# Patient Record
Sex: FEMALE | Race: WHITE | Employment: OTHER | URBAN - METROPOLITAN AREA
[De-identification: names, ages, dates, MRNs, and addresses within clinical notes are randomized per-mention and may not be internally consistent; named-entity substitution may affect disease eponyms.]

---

## 2017-03-14 DIAGNOSIS — I10 ESSENTIAL HYPERTENSION, BENIGN: ICD-10-CM

## 2017-03-14 RX ORDER — HYDROCHLOROTHIAZIDE 25 MG/1
25 TABLET ORAL DAILY
Qty: 90 TABLET | Refills: 1 | Status: SHIPPED | OUTPATIENT
Start: 2017-03-14 | End: 2018-02-20

## 2017-03-21 ENCOUNTER — OFFICE VISIT (OUTPATIENT)
Dept: FAMILY MEDICINE CLINIC | Age: 82
End: 2017-03-21
Payer: MEDICARE

## 2017-03-21 VITALS
OXYGEN SATURATION: 98 % | DIASTOLIC BLOOD PRESSURE: 60 MMHG | SYSTOLIC BLOOD PRESSURE: 102 MMHG | HEART RATE: 70 BPM | BODY MASS INDEX: 26.47 KG/M2 | WEIGHT: 164 LBS

## 2017-03-21 DIAGNOSIS — E78.00 PURE HYPERCHOLESTEROLEMIA: ICD-10-CM

## 2017-03-21 DIAGNOSIS — N39.46 MIXED INCONTINENCE: ICD-10-CM

## 2017-03-21 DIAGNOSIS — I10 ESSENTIAL HYPERTENSION, BENIGN: Primary | ICD-10-CM

## 2017-03-21 PROCEDURE — 1090F PRES/ABSN URINE INCON ASSESS: CPT | Performed by: FAMILY MEDICINE

## 2017-03-21 PROCEDURE — 1036F TOBACCO NON-USER: CPT | Performed by: FAMILY MEDICINE

## 2017-03-21 PROCEDURE — 1123F ACP DISCUSS/DSCN MKR DOCD: CPT | Performed by: FAMILY MEDICINE

## 2017-03-21 PROCEDURE — G8482 FLU IMMUNIZE ORDER/ADMIN: HCPCS | Performed by: FAMILY MEDICINE

## 2017-03-21 PROCEDURE — 4040F PNEUMOC VAC/ADMIN/RCVD: CPT | Performed by: FAMILY MEDICINE

## 2017-03-21 PROCEDURE — 0509F URINE INCON PLAN DOCD: CPT | Performed by: FAMILY MEDICINE

## 2017-03-21 PROCEDURE — G8420 CALC BMI NORM PARAMETERS: HCPCS | Performed by: FAMILY MEDICINE

## 2017-03-21 PROCEDURE — 99214 OFFICE O/P EST MOD 30 MIN: CPT | Performed by: FAMILY MEDICINE

## 2017-03-21 PROCEDURE — G8427 DOCREV CUR MEDS BY ELIG CLIN: HCPCS | Performed by: FAMILY MEDICINE

## 2017-03-21 ASSESSMENT — ENCOUNTER SYMPTOMS
SHORTNESS OF BREATH: 0
ABDOMINAL PAIN: 0
FACIAL SWELLING: 0
BLURRED VISION: 0
CONSTIPATION: 0
NAUSEA: 0
DIARRHEA: 0
EYE REDNESS: 0
VOMITING: 0
EYE DISCHARGE: 0
BLOOD IN STOOL: 0
COUGH: 0

## 2017-04-20 ENCOUNTER — HOSPITAL ENCOUNTER (OUTPATIENT)
Age: 82
Setting detail: SPECIMEN
Discharge: HOME | End: 2017-04-20
Payer: MEDICARE

## 2017-04-20 ENCOUNTER — OFFICE VISIT (OUTPATIENT)
Dept: FAMILY MEDICINE CLINIC | Age: 82
End: 2017-04-20
Payer: MEDICARE

## 2017-04-20 VITALS
HEIGHT: 64 IN | OXYGEN SATURATION: 96 % | TEMPERATURE: 97.8 F | DIASTOLIC BLOOD PRESSURE: 64 MMHG | SYSTOLIC BLOOD PRESSURE: 110 MMHG | HEART RATE: 72 BPM | WEIGHT: 162 LBS | BODY MASS INDEX: 27.66 KG/M2

## 2017-04-20 DIAGNOSIS — N30.00 ACUTE CYSTITIS WITHOUT HEMATURIA: Primary | ICD-10-CM

## 2017-04-20 DIAGNOSIS — S39.012A STRAIN OF LUMBAR PARASPINAL MUSCLE, INITIAL ENCOUNTER: ICD-10-CM

## 2017-04-20 DIAGNOSIS — N30.00 ACUTE CYSTITIS WITHOUT HEMATURIA: ICD-10-CM

## 2017-04-20 LAB
BILIRUBIN, POC: ABNORMAL
BLOOD URINE, POC: ABNORMAL
CLARITY, POC: ABNORMAL
COLOR, POC: ABNORMAL
GLUCOSE URINE, POC: ABNORMAL
KETONES, POC: ABNORMAL
LEUKOCYTE EST, POC: ABNORMAL
NITRITE, POC: POSITIVE
PH, POC: 6.5
PROTEIN, POC: ABNORMAL
SPECIFIC GRAVITY, POC: 1.01
UROBILINOGEN, POC: 0.2

## 2017-04-20 PROCEDURE — 87086 URINE CULTURE/COLONY COUNT: CPT

## 2017-04-20 PROCEDURE — 1123F ACP DISCUSS/DSCN MKR DOCD: CPT | Performed by: NURSE PRACTITIONER

## 2017-04-20 PROCEDURE — 81002 URINALYSIS NONAUTO W/O SCOPE: CPT | Performed by: NURSE PRACTITIONER

## 2017-04-20 PROCEDURE — 1090F PRES/ABSN URINE INCON ASSESS: CPT | Performed by: NURSE PRACTITIONER

## 2017-04-20 PROCEDURE — 1036F TOBACCO NON-USER: CPT | Performed by: NURSE PRACTITIONER

## 2017-04-20 PROCEDURE — G8427 DOCREV CUR MEDS BY ELIG CLIN: HCPCS | Performed by: NURSE PRACTITIONER

## 2017-04-20 PROCEDURE — 87186 SC STD MICRODIL/AGAR DIL: CPT

## 2017-04-20 PROCEDURE — 4040F PNEUMOC VAC/ADMIN/RCVD: CPT | Performed by: NURSE PRACTITIONER

## 2017-04-20 PROCEDURE — 99213 OFFICE O/P EST LOW 20 MIN: CPT | Performed by: NURSE PRACTITIONER

## 2017-04-20 PROCEDURE — 87088 URINE BACTERIA CULTURE: CPT

## 2017-04-20 PROCEDURE — G8420 CALC BMI NORM PARAMETERS: HCPCS | Performed by: NURSE PRACTITIONER

## 2017-04-20 RX ORDER — SULFAMETHOXAZOLE AND TRIMETHOPRIM 800; 160 MG/1; MG/1
1 TABLET ORAL 2 TIMES DAILY
Qty: 6 TABLET | Refills: 0 | Status: SHIPPED | OUTPATIENT
Start: 2017-04-20 | End: 2017-04-23

## 2017-04-20 ASSESSMENT — ENCOUNTER SYMPTOMS
BACK PAIN: 1
ABDOMINAL PAIN: 0
BOWEL INCONTINENCE: 0

## 2017-04-23 LAB
CULTURE: ABNORMAL
CULTURE: ABNORMAL
Lab: ABNORMAL
ORGANISM: ABNORMAL
SPECIMEN DESCRIPTION: ABNORMAL
SPECIMEN DESCRIPTION: ABNORMAL
STATUS: ABNORMAL

## 2017-04-24 RX ORDER — NITROFURANTOIN MACROCRYSTALS 100 MG/1
100 CAPSULE ORAL 2 TIMES DAILY
Qty: 10 CAPSULE | Refills: 0 | Status: SHIPPED | OUTPATIENT
Start: 2017-04-24 | End: 2017-04-29

## 2017-05-01 ENCOUNTER — OFFICE VISIT (OUTPATIENT)
Dept: FAMILY MEDICINE CLINIC | Age: 82
End: 2017-05-01
Payer: MEDICARE

## 2017-05-01 VITALS
HEART RATE: 76 BPM | WEIGHT: 162 LBS | SYSTOLIC BLOOD PRESSURE: 124 MMHG | BODY MASS INDEX: 27.66 KG/M2 | HEIGHT: 64 IN | DIASTOLIC BLOOD PRESSURE: 60 MMHG

## 2017-05-01 DIAGNOSIS — M54.50 ACUTE BILATERAL LOW BACK PAIN WITHOUT SCIATICA: ICD-10-CM

## 2017-05-01 DIAGNOSIS — R10.9 FLANK PAIN: Primary | ICD-10-CM

## 2017-05-01 LAB
BILIRUBIN, POC: NORMAL
BLOOD URINE, POC: NORMAL
CLARITY, POC: YELLOW
COLOR, POC: NORMAL
GLUCOSE URINE, POC: NORMAL
KETONES, POC: NORMAL
LEUKOCYTE EST, POC: NORMAL
NITRITE, POC: NORMAL
PH, POC: 7
PROTEIN, POC: NORMAL
SPECIFIC GRAVITY, POC: 1.02
UROBILINOGEN, POC: 0.2

## 2017-05-01 PROCEDURE — 1090F PRES/ABSN URINE INCON ASSESS: CPT | Performed by: FAMILY MEDICINE

## 2017-05-01 PROCEDURE — G8427 DOCREV CUR MEDS BY ELIG CLIN: HCPCS | Performed by: FAMILY MEDICINE

## 2017-05-01 PROCEDURE — G8420 CALC BMI NORM PARAMETERS: HCPCS | Performed by: FAMILY MEDICINE

## 2017-05-01 PROCEDURE — 99213 OFFICE O/P EST LOW 20 MIN: CPT | Performed by: FAMILY MEDICINE

## 2017-05-01 PROCEDURE — 1123F ACP DISCUSS/DSCN MKR DOCD: CPT | Performed by: FAMILY MEDICINE

## 2017-05-01 PROCEDURE — 1036F TOBACCO NON-USER: CPT | Performed by: FAMILY MEDICINE

## 2017-05-01 PROCEDURE — 81003 URINALYSIS AUTO W/O SCOPE: CPT | Performed by: FAMILY MEDICINE

## 2017-05-01 PROCEDURE — 4040F PNEUMOC VAC/ADMIN/RCVD: CPT | Performed by: FAMILY MEDICINE

## 2017-05-01 RX ORDER — NAPROXEN 500 MG/1
500 TABLET ORAL 2 TIMES DAILY WITH MEALS
Qty: 30 TABLET | Refills: 1 | Status: SHIPPED | OUTPATIENT
Start: 2017-05-01 | End: 2017-07-18 | Stop reason: SDUPTHER

## 2017-05-01 ASSESSMENT — ENCOUNTER SYMPTOMS
EYE DISCHARGE: 0
BOWEL INCONTINENCE: 0
VOMITING: 0
EYE REDNESS: 0
DIARRHEA: 0
BACK PAIN: 1
NAUSEA: 0
ABDOMINAL PAIN: 0

## 2017-07-18 ENCOUNTER — OFFICE VISIT (OUTPATIENT)
Dept: FAMILY MEDICINE CLINIC | Age: 82
End: 2017-07-18
Payer: MEDICARE

## 2017-07-18 VITALS — WEIGHT: 165 LBS | SYSTOLIC BLOOD PRESSURE: 102 MMHG | BODY MASS INDEX: 28.77 KG/M2 | DIASTOLIC BLOOD PRESSURE: 58 MMHG

## 2017-07-18 DIAGNOSIS — S76.302A HAMSTRING INJURY, LEFT, INITIAL ENCOUNTER: Primary | ICD-10-CM

## 2017-07-18 PROCEDURE — 1036F TOBACCO NON-USER: CPT | Performed by: FAMILY MEDICINE

## 2017-07-18 PROCEDURE — G8419 CALC BMI OUT NRM PARAM NOF/U: HCPCS | Performed by: FAMILY MEDICINE

## 2017-07-18 PROCEDURE — 4040F PNEUMOC VAC/ADMIN/RCVD: CPT | Performed by: FAMILY MEDICINE

## 2017-07-18 PROCEDURE — 1123F ACP DISCUSS/DSCN MKR DOCD: CPT | Performed by: FAMILY MEDICINE

## 2017-07-18 PROCEDURE — 99213 OFFICE O/P EST LOW 20 MIN: CPT | Performed by: FAMILY MEDICINE

## 2017-07-18 PROCEDURE — 1090F PRES/ABSN URINE INCON ASSESS: CPT | Performed by: FAMILY MEDICINE

## 2017-07-18 PROCEDURE — G8427 DOCREV CUR MEDS BY ELIG CLIN: HCPCS | Performed by: FAMILY MEDICINE

## 2017-07-18 RX ORDER — NAPROXEN 500 MG/1
500 TABLET ORAL 2 TIMES DAILY WITH MEALS
Qty: 180 TABLET | Refills: 0 | Status: SHIPPED | OUTPATIENT
Start: 2017-07-18 | End: 2018-01-12 | Stop reason: SDUPTHER

## 2017-07-18 ASSESSMENT — PATIENT HEALTH QUESTIONNAIRE - PHQ9
SUM OF ALL RESPONSES TO PHQ QUESTIONS 1-9: 0
SUM OF ALL RESPONSES TO PHQ9 QUESTIONS 1 & 2: 0
1. LITTLE INTEREST OR PLEASURE IN DOING THINGS: 0
2. FEELING DOWN, DEPRESSED OR HOPELESS: 0

## 2017-07-18 ASSESSMENT — ENCOUNTER SYMPTOMS
EYE DISCHARGE: 0
EYE REDNESS: 0
NAUSEA: 0
DIARRHEA: 0
BACK PAIN: 0
VOMITING: 0

## 2017-07-20 ENCOUNTER — HOSPITAL ENCOUNTER (OUTPATIENT)
Dept: PHYSICAL THERAPY | Age: 82
Setting detail: THERAPIES SERIES
Discharge: HOME | End: 2017-07-20
Payer: MEDICARE

## 2017-07-20 PROCEDURE — G8978 MOBILITY CURRENT STATUS: HCPCS

## 2017-07-20 PROCEDURE — G8979 MOBILITY GOAL STATUS: HCPCS

## 2017-07-20 PROCEDURE — 97161 PT EVAL LOW COMPLEX 20 MIN: CPT

## 2017-07-20 ASSESSMENT — PAIN SCALES - GENERAL: PAINLEVEL_OUTOF10: 4

## 2017-07-24 ENCOUNTER — HOSPITAL ENCOUNTER (OUTPATIENT)
Dept: PHYSICAL THERAPY | Age: 82
Setting detail: THERAPIES SERIES
Discharge: HOME | End: 2017-07-24
Payer: MEDICARE

## 2017-07-24 PROCEDURE — 97140 MANUAL THERAPY 1/> REGIONS: CPT

## 2017-07-24 PROCEDURE — 97110 THERAPEUTIC EXERCISES: CPT

## 2017-07-24 ASSESSMENT — PAIN SCALES - GENERAL: PAINLEVEL_OUTOF10: 4

## 2017-07-27 ENCOUNTER — HOSPITAL ENCOUNTER (OUTPATIENT)
Dept: PHYSICAL THERAPY | Age: 82
Setting detail: THERAPIES SERIES
Discharge: HOME | End: 2017-07-27
Payer: MEDICARE

## 2017-07-27 ENCOUNTER — APPOINTMENT (OUTPATIENT)
Dept: PHYSICAL THERAPY | Age: 82
End: 2017-07-27
Payer: MEDICARE

## 2017-07-27 PROCEDURE — 97110 THERAPEUTIC EXERCISES: CPT

## 2017-07-27 PROCEDURE — 97140 MANUAL THERAPY 1/> REGIONS: CPT

## 2017-07-27 ASSESSMENT — PAIN SCALES - GENERAL: PAINLEVEL_OUTOF10: 8

## 2017-07-31 ENCOUNTER — HOSPITAL ENCOUNTER (OUTPATIENT)
Dept: PHYSICAL THERAPY | Age: 82
Setting detail: THERAPIES SERIES
Discharge: HOME | End: 2017-07-31
Payer: MEDICARE

## 2017-07-31 PROCEDURE — 97140 MANUAL THERAPY 1/> REGIONS: CPT

## 2017-07-31 PROCEDURE — G0283 ELEC STIM OTHER THAN WOUND: HCPCS

## 2017-07-31 PROCEDURE — 97110 THERAPEUTIC EXERCISES: CPT

## 2017-07-31 ASSESSMENT — PAIN SCALES - GENERAL: PAINLEVEL_OUTOF10: 9

## 2017-08-03 ENCOUNTER — HOSPITAL ENCOUNTER (OUTPATIENT)
Dept: PHYSICAL THERAPY | Age: 82
Setting detail: THERAPIES SERIES
Discharge: HOME | End: 2017-08-03
Payer: MEDICARE

## 2017-08-03 ENCOUNTER — OFFICE VISIT (OUTPATIENT)
Dept: FAMILY MEDICINE CLINIC | Age: 82
End: 2017-08-03
Payer: MEDICARE

## 2017-08-03 VITALS — SYSTOLIC BLOOD PRESSURE: 138 MMHG | DIASTOLIC BLOOD PRESSURE: 64 MMHG | BODY MASS INDEX: 28.77 KG/M2 | WEIGHT: 165 LBS

## 2017-08-03 DIAGNOSIS — R21 RASH AND NONSPECIFIC SKIN ERUPTION: Primary | ICD-10-CM

## 2017-08-03 PROCEDURE — 97140 MANUAL THERAPY 1/> REGIONS: CPT

## 2017-08-03 PROCEDURE — G8419 CALC BMI OUT NRM PARAM NOF/U: HCPCS | Performed by: FAMILY MEDICINE

## 2017-08-03 PROCEDURE — G0283 ELEC STIM OTHER THAN WOUND: HCPCS

## 2017-08-03 PROCEDURE — 1036F TOBACCO NON-USER: CPT | Performed by: FAMILY MEDICINE

## 2017-08-03 PROCEDURE — 1090F PRES/ABSN URINE INCON ASSESS: CPT | Performed by: FAMILY MEDICINE

## 2017-08-03 PROCEDURE — 1123F ACP DISCUSS/DSCN MKR DOCD: CPT | Performed by: FAMILY MEDICINE

## 2017-08-03 PROCEDURE — 99213 OFFICE O/P EST LOW 20 MIN: CPT | Performed by: FAMILY MEDICINE

## 2017-08-03 PROCEDURE — 4040F PNEUMOC VAC/ADMIN/RCVD: CPT | Performed by: FAMILY MEDICINE

## 2017-08-03 PROCEDURE — G8427 DOCREV CUR MEDS BY ELIG CLIN: HCPCS | Performed by: FAMILY MEDICINE

## 2017-08-03 PROCEDURE — 97110 THERAPEUTIC EXERCISES: CPT

## 2017-08-03 ASSESSMENT — PAIN SCALES - GENERAL: PAINLEVEL_OUTOF10: 4

## 2017-08-03 ASSESSMENT — ENCOUNTER SYMPTOMS
NAUSEA: 0
DIARRHEA: 0
SORE THROAT: 0
VOMITING: 0
RHINORRHEA: 0
SHORTNESS OF BREATH: 0
COUGH: 0

## 2017-08-07 ENCOUNTER — HOSPITAL ENCOUNTER (OUTPATIENT)
Dept: PHYSICAL THERAPY | Age: 82
Setting detail: THERAPIES SERIES
Discharge: HOME | End: 2017-08-07
Payer: MEDICARE

## 2017-08-07 PROCEDURE — 97110 THERAPEUTIC EXERCISES: CPT

## 2017-08-07 PROCEDURE — G0283 ELEC STIM OTHER THAN WOUND: HCPCS

## 2017-08-07 PROCEDURE — 97140 MANUAL THERAPY 1/> REGIONS: CPT

## 2017-08-07 ASSESSMENT — PAIN SCALES - GENERAL: PAINLEVEL_OUTOF10: 5

## 2017-08-10 ENCOUNTER — HOSPITAL ENCOUNTER (OUTPATIENT)
Dept: PHYSICAL THERAPY | Age: 82
Setting detail: THERAPIES SERIES
Discharge: HOME | End: 2017-08-10
Payer: MEDICARE

## 2017-08-10 PROCEDURE — 97110 THERAPEUTIC EXERCISES: CPT

## 2017-08-10 PROCEDURE — 97140 MANUAL THERAPY 1/> REGIONS: CPT

## 2017-08-10 ASSESSMENT — PAIN SCALES - GENERAL: PAINLEVEL_OUTOF10: 5

## 2017-08-14 ENCOUNTER — HOSPITAL ENCOUNTER (OUTPATIENT)
Dept: PHYSICAL THERAPY | Age: 82
Setting detail: THERAPIES SERIES
Discharge: HOME | End: 2017-08-14
Payer: MEDICARE

## 2017-08-14 PROCEDURE — 97140 MANUAL THERAPY 1/> REGIONS: CPT

## 2017-08-14 PROCEDURE — 97110 THERAPEUTIC EXERCISES: CPT

## 2017-08-17 ENCOUNTER — HOSPITAL ENCOUNTER (OUTPATIENT)
Dept: PHYSICAL THERAPY | Age: 82
Setting detail: THERAPIES SERIES
Discharge: HOME | End: 2017-08-17
Payer: MEDICARE

## 2017-08-17 PROCEDURE — G8978 MOBILITY CURRENT STATUS: HCPCS

## 2017-08-17 PROCEDURE — 97012 MECHANICAL TRACTION THERAPY: CPT

## 2017-08-17 PROCEDURE — G8979 MOBILITY GOAL STATUS: HCPCS

## 2017-08-17 PROCEDURE — 97110 THERAPEUTIC EXERCISES: CPT

## 2017-08-17 ASSESSMENT — PAIN SCALES - GENERAL: PAINLEVEL_OUTOF10: 3

## 2017-08-21 ENCOUNTER — HOSPITAL ENCOUNTER (OUTPATIENT)
Dept: PHYSICAL THERAPY | Age: 82
Setting detail: THERAPIES SERIES
Discharge: HOME | End: 2017-08-21
Payer: MEDICARE

## 2017-08-21 PROCEDURE — 97012 MECHANICAL TRACTION THERAPY: CPT

## 2017-08-21 PROCEDURE — 97110 THERAPEUTIC EXERCISES: CPT

## 2017-08-21 ASSESSMENT — PAIN SCALES - GENERAL: PAINLEVEL_OUTOF10: 6

## 2017-08-24 ENCOUNTER — HOSPITAL ENCOUNTER (OUTPATIENT)
Dept: PHYSICAL THERAPY | Age: 82
Setting detail: THERAPIES SERIES
Discharge: HOME | End: 2017-08-24
Payer: MEDICARE

## 2017-08-24 PROCEDURE — 97110 THERAPEUTIC EXERCISES: CPT

## 2017-08-24 PROCEDURE — 97012 MECHANICAL TRACTION THERAPY: CPT

## 2017-08-28 ENCOUNTER — HOSPITAL ENCOUNTER (OUTPATIENT)
Dept: PHYSICAL THERAPY | Age: 82
Setting detail: THERAPIES SERIES
Discharge: HOME | End: 2017-08-28
Payer: MEDICARE

## 2017-08-28 PROCEDURE — 97110 THERAPEUTIC EXERCISES: CPT

## 2017-08-28 PROCEDURE — 97012 MECHANICAL TRACTION THERAPY: CPT

## 2017-08-28 ASSESSMENT — PAIN SCALES - GENERAL: PAINLEVEL_OUTOF10: 7

## 2017-08-31 ENCOUNTER — HOSPITAL ENCOUNTER (OUTPATIENT)
Dept: PHYSICAL THERAPY | Age: 82
Setting detail: THERAPIES SERIES
Discharge: HOME | End: 2017-08-31
Payer: MEDICARE

## 2017-08-31 PROCEDURE — 97140 MANUAL THERAPY 1/> REGIONS: CPT

## 2017-08-31 PROCEDURE — G8980 MOBILITY D/C STATUS: HCPCS

## 2017-08-31 PROCEDURE — 97110 THERAPEUTIC EXERCISES: CPT

## 2017-08-31 PROCEDURE — G8979 MOBILITY GOAL STATUS: HCPCS

## 2017-08-31 ASSESSMENT — PAIN SCALES - GENERAL: PAINLEVEL_OUTOF10: 5

## 2017-09-07 ENCOUNTER — HOSPITAL ENCOUNTER (OUTPATIENT)
Dept: GENERAL RADIOLOGY | Age: 82
Discharge: HOME | End: 2017-09-07
Payer: MEDICARE

## 2017-09-07 ENCOUNTER — OFFICE VISIT (OUTPATIENT)
Dept: FAMILY MEDICINE CLINIC | Age: 82
End: 2017-09-07
Payer: MEDICARE

## 2017-09-07 ENCOUNTER — HOSPITAL ENCOUNTER (OUTPATIENT)
Age: 82
Discharge: HOME | End: 2017-09-07
Payer: MEDICARE

## 2017-09-07 VITALS — BODY MASS INDEX: 28.6 KG/M2 | WEIGHT: 164 LBS | SYSTOLIC BLOOD PRESSURE: 110 MMHG | DIASTOLIC BLOOD PRESSURE: 60 MMHG

## 2017-09-07 DIAGNOSIS — M54.42 ACUTE LEFT-SIDED LOW BACK PAIN WITH LEFT-SIDED SCIATICA: ICD-10-CM

## 2017-09-07 DIAGNOSIS — M25.552 PAIN OF LEFT HIP JOINT: Primary | ICD-10-CM

## 2017-09-07 DIAGNOSIS — M25.552 PAIN OF LEFT HIP JOINT: ICD-10-CM

## 2017-09-07 DIAGNOSIS — R20.2 PARESTHESIA OF LEFT LEG: ICD-10-CM

## 2017-09-07 PROCEDURE — G8427 DOCREV CUR MEDS BY ELIG CLIN: HCPCS | Performed by: FAMILY MEDICINE

## 2017-09-07 PROCEDURE — 1036F TOBACCO NON-USER: CPT | Performed by: FAMILY MEDICINE

## 2017-09-07 PROCEDURE — G8419 CALC BMI OUT NRM PARAM NOF/U: HCPCS | Performed by: FAMILY MEDICINE

## 2017-09-07 PROCEDURE — 4040F PNEUMOC VAC/ADMIN/RCVD: CPT | Performed by: FAMILY MEDICINE

## 2017-09-07 PROCEDURE — 1123F ACP DISCUSS/DSCN MKR DOCD: CPT | Performed by: FAMILY MEDICINE

## 2017-09-07 PROCEDURE — 99213 OFFICE O/P EST LOW 20 MIN: CPT | Performed by: FAMILY MEDICINE

## 2017-09-07 PROCEDURE — 1090F PRES/ABSN URINE INCON ASSESS: CPT | Performed by: FAMILY MEDICINE

## 2017-09-07 PROCEDURE — 73502 X-RAY EXAM HIP UNI 2-3 VIEWS: CPT

## 2017-09-07 ASSESSMENT — ENCOUNTER SYMPTOMS
BOWEL INCONTINENCE: 0
BACK PAIN: 1
EYE DISCHARGE: 0
EYE REDNESS: 0

## 2017-09-11 ENCOUNTER — HOSPITAL ENCOUNTER (OUTPATIENT)
Dept: MRI IMAGING | Age: 82
Discharge: HOME | End: 2017-09-11
Payer: MEDICARE

## 2017-09-11 DIAGNOSIS — M54.42 ACUTE LEFT-SIDED LOW BACK PAIN WITH LEFT-SIDED SCIATICA: ICD-10-CM

## 2017-09-11 DIAGNOSIS — R20.2 PARESTHESIA OF LEFT LEG: ICD-10-CM

## 2017-09-11 PROCEDURE — 72148 MRI LUMBAR SPINE W/O DYE: CPT

## 2017-09-13 ENCOUNTER — TELEPHONE (OUTPATIENT)
Dept: FAMILY MEDICINE CLINIC | Age: 82
End: 2017-09-13

## 2017-09-13 DIAGNOSIS — M25.552 LEFT HIP PAIN: Primary | ICD-10-CM

## 2017-09-13 DIAGNOSIS — G89.29 CHRONIC LEFT-SIDED LOW BACK PAIN WITHOUT SCIATICA: ICD-10-CM

## 2017-09-13 DIAGNOSIS — M54.50 CHRONIC LEFT-SIDED LOW BACK PAIN WITHOUT SCIATICA: ICD-10-CM

## 2017-09-15 ENCOUNTER — HOSPITAL ENCOUNTER (OUTPATIENT)
Dept: PHYSICAL THERAPY | Age: 82
Setting detail: THERAPIES SERIES
Discharge: HOME | End: 2017-09-15
Payer: MEDICARE

## 2017-09-15 ENCOUNTER — TELEPHONE (OUTPATIENT)
Dept: FAMILY MEDICINE CLINIC | Age: 82
End: 2017-09-15

## 2017-10-12 ENCOUNTER — OFFICE VISIT (OUTPATIENT)
Dept: FAMILY MEDICINE CLINIC | Age: 82
End: 2017-10-12
Payer: MEDICARE

## 2017-10-12 VITALS — DIASTOLIC BLOOD PRESSURE: 64 MMHG | SYSTOLIC BLOOD PRESSURE: 120 MMHG | WEIGHT: 164 LBS | BODY MASS INDEX: 28.6 KG/M2

## 2017-10-12 DIAGNOSIS — M79.605 LEG PAIN, POSTERIOR, LEFT: Primary | ICD-10-CM

## 2017-10-12 DIAGNOSIS — R07.89 ATYPICAL CHEST PAIN: ICD-10-CM

## 2017-10-12 PROCEDURE — 99213 OFFICE O/P EST LOW 20 MIN: CPT | Performed by: FAMILY MEDICINE

## 2017-10-12 PROCEDURE — 1090F PRES/ABSN URINE INCON ASSESS: CPT | Performed by: FAMILY MEDICINE

## 2017-10-12 PROCEDURE — G8482 FLU IMMUNIZE ORDER/ADMIN: HCPCS | Performed by: FAMILY MEDICINE

## 2017-10-12 PROCEDURE — 1036F TOBACCO NON-USER: CPT | Performed by: FAMILY MEDICINE

## 2017-10-12 PROCEDURE — 4040F PNEUMOC VAC/ADMIN/RCVD: CPT | Performed by: FAMILY MEDICINE

## 2017-10-12 PROCEDURE — G8427 DOCREV CUR MEDS BY ELIG CLIN: HCPCS | Performed by: FAMILY MEDICINE

## 2017-10-12 PROCEDURE — G8417 CALC BMI ABV UP PARAM F/U: HCPCS | Performed by: FAMILY MEDICINE

## 2017-10-12 PROCEDURE — 1123F ACP DISCUSS/DSCN MKR DOCD: CPT | Performed by: FAMILY MEDICINE

## 2017-10-12 RX ORDER — OMEPRAZOLE 20 MG/1
20 CAPSULE, DELAYED RELEASE ORAL DAILY
Qty: 30 CAPSULE | Refills: 1 | Status: SHIPPED | OUTPATIENT
Start: 2017-10-12 | End: 2017-11-21 | Stop reason: SDUPTHER

## 2017-10-12 ASSESSMENT — ENCOUNTER SYMPTOMS
HEMOPTYSIS: 0
NAUSEA: 0
DIARRHEA: 0
EYE REDNESS: 0
COUGH: 0
VOMITING: 0
SHORTNESS OF BREATH: 0
EYE DISCHARGE: 0

## 2017-10-12 NOTE — PROGRESS NOTES
HPI Notes    Name: Michael Benjamin  : 1924         Chief Complaint:     Chief Complaint   Patient presents with    Leg Pain     Pt continues to c/o Lf leg pain, Pt is taking Naproxen BID with food, Pt had left hip x-ray on 17 and MRI lumbar spine  17     Chest Pain     Pt c/o pressure/dull pain in Rt chest are under Rt breast,  Pt denies SOB,HA,N/V, numbness, tingling       History of Present Illness:      Michael Benjamin is a 80 y.o.  female who presents with Leg Pain (Pt continues to c/o Lf leg pain, Pt is taking Naproxen BID with food, Pt had left hip x-ray on 17 and MRI lumbar spine  17 ) and Chest Pain (Pt c/o pressure/dull pain in Rt chest are under Rt breast,  Pt denies SOB,HA,N/V, numbness, tingling)      Leg Pain    Incident onset: Pt has had this pain for about 6mos. There was no injury mechanism. The pain is present in the left leg. The quality of the pain is described as aching. The pain has been fluctuating since onset. Pertinent negatives include no inability to bear weight, loss of motion, muscle weakness, numbness or tingling. Exacerbated by: walking. She has tried heat, ice and rest (physical therapy. Pt states she felt the physical therapy did help some but PT said she had completed her time. ) for the symptoms. Chest Pain    This is a new problem. The current episode started in the past 7 days. The onset quality is gradual (Pt also states she has been eating more tomatoes. ). The problem occurs intermittently. The pain is present in the substernal region. Quality: Pt states she feels a pressure when she takes a deep breath. The pain does not radiate. Associated symptoms include leg pain. Pertinent negatives include no cough, dizziness, fever, headaches, hemoptysis, irregular heartbeat, lower extremity edema, nausea, near-syncope, numbness, palpitations, shortness of breath or vomiting. Associated symptoms comments: \"bitter burping\" .  The pain is aggravated by

## 2017-10-25 ENCOUNTER — OFFICE VISIT (OUTPATIENT)
Dept: FAMILY MEDICINE CLINIC | Age: 82
End: 2017-10-25
Payer: MEDICARE

## 2017-10-25 VITALS
BODY MASS INDEX: 28.68 KG/M2 | DIASTOLIC BLOOD PRESSURE: 54 MMHG | HEART RATE: 68 BPM | HEIGHT: 64 IN | WEIGHT: 168 LBS | SYSTOLIC BLOOD PRESSURE: 102 MMHG

## 2017-10-25 DIAGNOSIS — R20.2 PARESTHESIA OF LEFT LEG: ICD-10-CM

## 2017-10-25 DIAGNOSIS — I95.89 OTHER SPECIFIED HYPOTENSION: ICD-10-CM

## 2017-10-25 DIAGNOSIS — K21.9 GASTROESOPHAGEAL REFLUX DISEASE WITHOUT ESOPHAGITIS: Primary | ICD-10-CM

## 2017-10-25 PROCEDURE — G8417 CALC BMI ABV UP PARAM F/U: HCPCS | Performed by: FAMILY MEDICINE

## 2017-10-25 PROCEDURE — G8427 DOCREV CUR MEDS BY ELIG CLIN: HCPCS | Performed by: FAMILY MEDICINE

## 2017-10-25 PROCEDURE — 1090F PRES/ABSN URINE INCON ASSESS: CPT | Performed by: FAMILY MEDICINE

## 2017-10-25 PROCEDURE — 1123F ACP DISCUSS/DSCN MKR DOCD: CPT | Performed by: FAMILY MEDICINE

## 2017-10-25 PROCEDURE — G8482 FLU IMMUNIZE ORDER/ADMIN: HCPCS | Performed by: FAMILY MEDICINE

## 2017-10-25 PROCEDURE — 1036F TOBACCO NON-USER: CPT | Performed by: FAMILY MEDICINE

## 2017-10-25 PROCEDURE — 99213 OFFICE O/P EST LOW 20 MIN: CPT | Performed by: FAMILY MEDICINE

## 2017-10-25 PROCEDURE — 4040F PNEUMOC VAC/ADMIN/RCVD: CPT | Performed by: FAMILY MEDICINE

## 2017-10-25 ASSESSMENT — ENCOUNTER SYMPTOMS
CHOKING: 0
ABDOMINAL PAIN: 0
SORE THROAT: 0
COUGH: 0
FACIAL SWELLING: 0
HEARTBURN: 0
VOMITING: 0
SHORTNESS OF BREATH: 0
NAUSEA: 0
EYE DISCHARGE: 0
EYE REDNESS: 0
CONSTIPATION: 0
DIARRHEA: 0

## 2017-10-25 NOTE — PRE-CERTIFICATION NOTE
Medicare Cap     [] Physical Therapy  [] Speech Therapy  [] Occupational therapy    *PT and Speech caps combine      $6119 Cap limit < kx modifier needed < $3487 requires pre-cert     Patient Name: Binh Penaloza  YOB: 1924     Date of M[address] Name $$$ charge Daily Charge YTD   Total $   10/25/17 PREV 2017 [facility] CHARGES 935.13 935.13 935.13

## 2017-10-26 ENCOUNTER — TELEPHONE (OUTPATIENT)
Dept: FAMILY MEDICINE CLINIC | Age: 82
End: 2017-10-26

## 2017-10-26 DIAGNOSIS — M48.061 SPINAL STENOSIS OF LUMBAR REGION, UNSPECIFIED WHETHER NEUROGENIC CLAUDICATION PRESENT: ICD-10-CM

## 2017-10-26 DIAGNOSIS — R20.2 LEFT LEG PARESTHESIAS: Primary | ICD-10-CM

## 2017-10-26 NOTE — TELEPHONE ENCOUNTER
Patient has decided that \"Dr Adilene Crook is right and I probably should see the vein specialist\" - states that she is just putting off the inevitable - could we do a referral for her    Health Maintenance   Topic Date Due    DTaP/Tdap/Td vaccine (1 - Tdap) 12/12/1943    Zostavax vaccine  12/12/1984    Flu vaccine  Completed    Pneumococcal low/med risk  Completed             (applicable per patient's age: Cancer Screenings, Depression Screening, Fall Risk Screening, Immunizations)    LDL Cholesterol (mg/dL)   Date Value   09/12/2016 138 (H)     AST (U/L)   Date Value   09/12/2016 20     ALT (U/L)   Date Value   09/12/2016 14     BUN (mg/dL)   Date Value   09/12/2016 22      (goal A1C is < 7)   (goal LDL is <100) need 30-50% reduction from baseline     BP Readings from Last 3 Encounters:   10/25/17 (!) 102/54   10/12/17 120/64   09/07/17 110/60    (goal /80)      All Future Testing planned in [facility]:  Lab Frequency Next Occurrence       Next Visit Date:  Future Appointments  Date Time Provider Damon Cesar   11/2/2017 2:00 PM Carl Schulz PT [facility] PT Bobby Rivera   11/20/2017 9:00 AM Norris Reyes MD [facility] MED W            Patient Active Problem List:     Esophageal reflux     Diaphragmatic hernia     Essential hypertension, benign     Pure hypercholesterolemia     Contact dermatitis     Lower urinary tract infectious disease     Cholelithiases     Cholangitis     Endometrial thickening on ultra sound     Cervical stenosis (uterine cervix)

## 2017-11-02 ENCOUNTER — HOSPITAL ENCOUNTER (OUTPATIENT)
Dept: PHYSICAL THERAPY | Age: 82
Setting detail: THERAPIES SERIES
Discharge: HOME | End: 2017-11-02
Payer: MEDICARE

## 2017-11-02 PROCEDURE — 97110 THERAPEUTIC EXERCISES: CPT

## 2017-11-02 PROCEDURE — G8979 MOBILITY GOAL STATUS: HCPCS

## 2017-11-02 PROCEDURE — 97162 PT EVAL MOD COMPLEX 30 MIN: CPT

## 2017-11-02 PROCEDURE — G8978 MOBILITY CURRENT STATUS: HCPCS

## 2017-11-02 ASSESSMENT — PAIN DESCRIPTION - LOCATION: LOCATION: LEG

## 2017-11-02 ASSESSMENT — PAIN SCALES - GENERAL: PAINLEVEL_OUTOF10: 4

## 2017-11-02 ASSESSMENT — PAIN DESCRIPTION - ORIENTATION: ORIENTATION: LEFT

## 2017-11-02 NOTE — PROGRESS NOTES
Injury/Surgery  [] Seizure Disorder   [] WB Restrictions  [] Obesity    [] Neuropathy      [x] Primary Impairment : Mobility [] Secondary Impairment : NA        G Code:    [x] Mobility         [] Carry        [] Body Position       [] Self Care      [] Other:   Functional Impairment Current:  [] 0%    [] 1-19% [] 20-39% [] 40-59% [x] 60-79%    [] 80-99% [] 100%    Functional Impairment Goal:  [] 0%    [] 1-19% [] 20-39% [x] 40-59% [] 60-79%    [] 80-99% [] 100%    G Code Functional Impairment determined by:  [] Clinical Judgment   [x] Outcome Measure:     Education:   Patient Education: On POC       Goals  Short term goals  Time Frame for Short term goals: 8  Short term goal 1: Patient to be independent with HEP for low back stretches and trunk stability  Short term goal 2:  Increase strength left hip abd 4+/5  Short term goal 3: Patient to be able to squat and  5# object from floor safely without LOB x10    Long term goals  Time Frame for Long term goals : 10  Long term goal 1: Decrease pain 2/10 left leg x 3 days  Long term goal 2: Improve mobility with score of 40/80 or better on LEFS  Long term goal 3: Patient to be able to alternate feet up and down stairs safely    Patient's Goal:   Decrease pain in left leg    Timed Code Treatment Minutes: 40 Minutes  Total Treatment Time: 40     Time In: 14;00  Time Out: 14;40    Franchesca Steel  11/2/2017

## 2017-11-07 ENCOUNTER — HOSPITAL ENCOUNTER (OUTPATIENT)
Dept: PHYSICAL THERAPY | Age: 82
Setting detail: THERAPIES SERIES
Discharge: HOME | End: 2017-11-07
Payer: MEDICARE

## 2017-11-07 PROCEDURE — 97140 MANUAL THERAPY 1/> REGIONS: CPT

## 2017-11-07 PROCEDURE — 97110 THERAPEUTIC EXERCISES: CPT

## 2017-11-07 ASSESSMENT — PAIN SCALES - GENERAL: PAINLEVEL_OUTOF10: 3

## 2017-11-07 ASSESSMENT — PAIN DESCRIPTION - LOCATION: LOCATION: LEG

## 2017-11-07 ASSESSMENT — PAIN DESCRIPTION - ORIENTATION: ORIENTATION: LEFT

## 2017-11-07 NOTE — PRE-CERTIFICATION NOTE
Medicare Cap     [] Physical Therapy  [] Speech Therapy  [] Occupational therapy    *PT and Speech caps combine      $2297 Cap limit < kx modifier needed < $7071 requires pre-cert     Patient Name: Boubacar Caputo  YOB: 1924     Date of M[address] Name $$$ charge Daily Charge YTD   Total $   11/2/17 Previous balance 935.13  935.13   11/2/17 Eval, therex 78.94, 31.69 110.63 1045.76   11/07/17 Ex x 2, man 31.69 x 2, 29.30 92.68 1138.44

## 2017-11-09 ENCOUNTER — HOSPITAL ENCOUNTER (OUTPATIENT)
Dept: PHYSICAL THERAPY | Age: 82
Setting detail: THERAPIES SERIES
Discharge: HOME | End: 2017-11-09
Payer: MEDICARE

## 2017-11-09 PROCEDURE — 97110 THERAPEUTIC EXERCISES: CPT

## 2017-11-09 PROCEDURE — 97140 MANUAL THERAPY 1/> REGIONS: CPT

## 2017-11-09 ASSESSMENT — PAIN SCALES - GENERAL: PAINLEVEL_OUTOF10: 3

## 2017-11-09 NOTE — PROGRESS NOTES
Phone: [address]      Fax: [phone]                            Outpatient Physical Therapy                                                                            Daily Note    Date: 2017  Patient Name: Nelda Rodriguez        MRN: [id number]   ACCT#:  [de-identified]  : 1924  (80 y.o.)    Referring Practitioner: Dr Rick Conde    Referral Date : 10/25/17    Diagnosis: Paresthesia left leg  Treatment Diagnosis: left leg pain, back pain    Onset Date: 17  PT Insurance Information: [facility]  Total # of Visits Approved: 10 Per Physician Order  Total # of Visits to Date: 3    Pre-Treatment Pain:  3/10     Assessment  Assessment: Pt. reports pain is a 3/10 this morning. She notes that following treatment she usually feels pretty good afterwards with relief lasting for a few days, but then the pain gradually returns. Good tolerance to exercises today. Post manual pt. reports no pain. Chart Reviewed: Yes    Plan  Plan: Continue with current plan    Exercises/Modalities/Manual:  See DocFlow Sheet    Education:           Goals  (Total # of Visits to Date: 3)   Short Term Goals - Time Frame for Short term goals: 8  Short term goal 1: Patient to be independent with HEP for low back stretches and trunk stability  Short term goal 2:  Increase strength left hip abd 4+/5  Short term goal 3: Patient to be able to squat and  5# object from floor safely without LOB x10          Long Term Goals - Time Frame for Long term goals : 10  Long term goal 1: Decrease pain 2/10 left leg x 3 days  Long term goal 2: Improve mobility with score of 40/80 or better on LEFS  Long term goal 3: Patient to be able to alternate feet up and down stairs safely          Post Treatment Pain:  0/10    Time In: 1045    Time Out : 1125   Timed Code Treatment Minutes: 40 Minutes  Total Treatment Time: [place] Number: PTA    Date: 2017

## 2017-11-13 ENCOUNTER — HOSPITAL ENCOUNTER (OUTPATIENT)
Dept: PHYSICAL THERAPY | Age: 82
Setting detail: THERAPIES SERIES
Discharge: HOME | End: 2017-11-13
Payer: MEDICARE

## 2017-11-13 PROCEDURE — 97110 THERAPEUTIC EXERCISES: CPT

## 2017-11-13 PROCEDURE — 97140 MANUAL THERAPY 1/> REGIONS: CPT

## 2017-11-13 ASSESSMENT — PAIN DESCRIPTION - LOCATION: LOCATION: LEG

## 2017-11-13 ASSESSMENT — PAIN DESCRIPTION - ORIENTATION: ORIENTATION: LEFT

## 2017-11-13 ASSESSMENT — PAIN SCALES - GENERAL: PAINLEVEL_OUTOF10: 4

## 2017-11-13 NOTE — PROGRESS NOTES
Phone: [address]      Fax: [phone]                            Outpatient Physical Therapy                                                                            Daily Note    Date: 2017  Patient Name: Keller Merlin        MRN: [id number]   ACCT#:  [de-identified]  : 1924  (80 y.o.)    Referring Practitioner: Dr Luan Sanders    Referral Date : 10/25/17    Diagnosis: Paresthesia left leg  Treatment Diagnosis: left leg pain, back pain    Onset Date: 17  PT Insurance Information: [facility]  Total # of Visits Approved: 10 Per Physician Order  Total # of Visits to Date: 4    Pre-Treatment Pain:  4/10     Assessment  Assessment: Pain 4/10 left hip area per patient. Patient able to squat and  5 cones without UE supprot, no LOB. Standing HEP ex- march, hip adb, hip ext, squats, heel raises x10 each- issued HEP handout. Chart Reviewed: Yes    Plan  Plan: Continue with current plan    Exercises/Modalities/Manual:  See DocFlow Sheet    Education:  standing HEP ex- march, hip adb, hip ext, squats, heel raises x10 each- issued HEP handout. Goals  (Total # of Visits to Date: 4)   Short Term Goals - Time Frame for Short term goals: 8  Short term goal 1: Patient to be independent with HEP for low back stretches and trunk stability  Short term goal 2:  Increase strength left hip abd 4+/5  Short term goal 3: Patient to be able to squat and  5# object from floor safely without LOB x10        Long Term Goals - Time Frame for Long term goals : 10  Long term goal 1: Decrease pain 2/10 left leg x 3 days  Long term goal 2: Improve mobility with score of 40/80 or better on LEFS  Long term goal 3: Patient to be able to alternate feet up and down stairs safely        Post Treatment Pain:  0/10    Time In: 9:00    Time Out : 9:43        Timed Code Treatment Minutes: 43 Minutes  Total Treatment Time:  [facility] Number: [id number]

## 2017-11-13 NOTE — PRE-CERTIFICATION NOTE
Medicare Cap     [] Physical Therapy  [] Speech Therapy  [] Occupational therapy    *PT and Speech caps combine      $1940 Cap limit < kx modifier needed < $5562 requires pre-cert     Patient Name: Brenda Europe: 12/12/1924     Date of M[address] Name $$$ charge Daily Charge YTD   Total $   11/2/17 Previous balance 935.13  935.13   11/2/17 Eval, therex 78.94, 31.69 110.63 1045.76   11/07/17 Ex x 2, man 31.69 x 2, 29.30 92.68 1138.44   11/9/17 Ex x2, man 31.69 x2, 29.30 92.68 1231.12   11/13/17 Ex x2, man 31.69 X 2, 29.30 92.68 1323.80

## 2017-11-16 ENCOUNTER — HOSPITAL ENCOUNTER (OUTPATIENT)
Dept: PHYSICAL THERAPY | Age: 82
Setting detail: THERAPIES SERIES
Discharge: HOME | End: 2017-11-16
Payer: MEDICARE

## 2017-11-16 PROCEDURE — 97140 MANUAL THERAPY 1/> REGIONS: CPT

## 2017-11-16 PROCEDURE — 97110 THERAPEUTIC EXERCISES: CPT

## 2017-11-16 ASSESSMENT — PAIN SCALES - GENERAL: PAINLEVEL_OUTOF10: 8

## 2017-11-16 NOTE — PRE-CERTIFICATION NOTE
Medicare Cap     [] Physical Therapy  [] Speech Therapy  [] Occupational therapy    *PT and Speech caps combine      $6558 Cap limit < kx modifier needed < $5219 requires pre-cert     Patient Name: Zakiya Acosta: 12/12/1924     Date of M[address] Name $$$ charge Daily Charge YTD   Total $   11/2/17 Previous balance 935.13  935.13   11/2/17 Eval, therex 78.94, 31.69 110.63 1045.76   11/07/17 Ex x 2, man 31.69 x 2, 29.30 92.68 1138.44   11/9/17 Ex x2, man 31.69 x2, 29.30 92.68 1231.12   11/13/17 Ex x2, man 31.69 X 2, 29.30 92.68 1323.80   11/16/17 Ex x2, man 31.69 X 2, 29.30 92.68 1416.48

## 2017-11-20 ENCOUNTER — HOSPITAL ENCOUNTER (OUTPATIENT)
Dept: PHYSICAL THERAPY | Age: 82
Setting detail: THERAPIES SERIES
Discharge: HOME | End: 2017-11-20
Payer: MEDICARE

## 2017-11-20 ENCOUNTER — OFFICE VISIT (OUTPATIENT)
Dept: FAMILY MEDICINE CLINIC | Age: 82
End: 2017-11-20
Payer: MEDICARE

## 2017-11-20 VITALS
HEIGHT: 64 IN | SYSTOLIC BLOOD PRESSURE: 120 MMHG | BODY MASS INDEX: 28.34 KG/M2 | DIASTOLIC BLOOD PRESSURE: 62 MMHG | WEIGHT: 166 LBS

## 2017-11-20 DIAGNOSIS — R20.2 PARESTHESIA OF LEFT LEG: ICD-10-CM

## 2017-11-20 DIAGNOSIS — L98.9 BENIGN SKIN LESION OF FACE: ICD-10-CM

## 2017-11-20 DIAGNOSIS — K21.9 GASTROESOPHAGEAL REFLUX DISEASE WITHOUT ESOPHAGITIS: ICD-10-CM

## 2017-11-20 DIAGNOSIS — I10 ESSENTIAL HYPERTENSION, BENIGN: Primary | ICD-10-CM

## 2017-11-20 PROCEDURE — 1036F TOBACCO NON-USER: CPT | Performed by: FAMILY MEDICINE

## 2017-11-20 PROCEDURE — 4040F PNEUMOC VAC/ADMIN/RCVD: CPT | Performed by: FAMILY MEDICINE

## 2017-11-20 PROCEDURE — 99213 OFFICE O/P EST LOW 20 MIN: CPT | Performed by: FAMILY MEDICINE

## 2017-11-20 PROCEDURE — 1090F PRES/ABSN URINE INCON ASSESS: CPT | Performed by: FAMILY MEDICINE

## 2017-11-20 PROCEDURE — G8427 DOCREV CUR MEDS BY ELIG CLIN: HCPCS | Performed by: FAMILY MEDICINE

## 2017-11-20 PROCEDURE — 1123F ACP DISCUSS/DSCN MKR DOCD: CPT | Performed by: FAMILY MEDICINE

## 2017-11-20 PROCEDURE — 97110 THERAPEUTIC EXERCISES: CPT

## 2017-11-20 PROCEDURE — G8417 CALC BMI ABV UP PARAM F/U: HCPCS | Performed by: FAMILY MEDICINE

## 2017-11-20 PROCEDURE — G8482 FLU IMMUNIZE ORDER/ADMIN: HCPCS | Performed by: FAMILY MEDICINE

## 2017-11-20 PROCEDURE — 97140 MANUAL THERAPY 1/> REGIONS: CPT

## 2017-11-20 ASSESSMENT — PAIN DESCRIPTION - LOCATION: LOCATION: LEG

## 2017-11-20 ASSESSMENT — ENCOUNTER SYMPTOMS
EYE DISCHARGE: 0
ABDOMINAL PAIN: 0
DIARRHEA: 0
VOMITING: 0
SORE THROAT: 0
GLOBUS SENSATION: 0
CHOKING: 0
HEARTBURN: 0
SHORTNESS OF BREATH: 0
WHEEZING: 0
NAUSEA: 0
EYE REDNESS: 0
COUGH: 0

## 2017-11-20 ASSESSMENT — PAIN DESCRIPTION - ORIENTATION: ORIENTATION: LEFT

## 2017-11-20 ASSESSMENT — PAIN SCALES - GENERAL: PAINLEVEL_OUTOF10: 5

## 2017-11-20 NOTE — PROGRESS NOTES
of Systems   Constitutional: Negative for chills and fatigue. HENT: Negative for congestion and sore throat. Eyes: Negative for discharge and redness. Respiratory: Negative for cough, choking, shortness of breath and wheezing. Cardiovascular: Negative for chest pain, palpitations and leg swelling. Gastrointestinal: Negative for abdominal pain, diarrhea, dysphagia, heartburn, nausea and vomiting. Musculoskeletal:        Lt leg pain/numbness -- improved now with PT. Skin: Negative for pallor and rash. Rough and raised skin lesions on her face. Neurological: Negative for dizziness and headaches. Physical Exam:     Physical Exam   Constitutional: She appears well-developed and well-nourished. HENT:   Head: Normocephalic and atraumatic. Eyes: Conjunctivae are normal. Right eye exhibits no discharge. Left eye exhibits no discharge. Cardiovascular: Normal rate. No murmur heard. Pulmonary/Chest: Effort normal. No respiratory distress. Musculoskeletal: She exhibits no edema or tenderness. +5/5 LE strength Bilateral and good ROM of the Lt Hip. Skin: No rash noted. No erythema. Bilateral cheeks with flesh colored \"stuck on lesions\". Vitals reviewed.       Vitals:  /62   Ht 5' 4\" (1.626 m)   Wt 166 lb (75.3 kg)   LMP  (LMP Unknown)   BMI 28.49 kg/m²       Data:     Lab Results   Component Value Date     09/12/2016    K 3.7 09/12/2016     09/12/2016    CO2 28 09/12/2016    BUN 22 09/12/2016    CREATININE 0.82 09/12/2016    GLUCOSE 104 09/12/2016    GLUCOSE 103 06/06/2012    PROT 6.7 09/12/2016    LABALBU 3.7 09/12/2016    LABALBU 4.1 06/06/2012    BILITOT 0.62 09/12/2016    ALKPHOS 96 09/12/2016    AST 20 09/12/2016    ALT 14 09/12/2016     Lab Results   Component Value Date    WBC 5.9 08/04/2016    RBC 4.44 08/04/2016    RBC 4.73 12/14/2011    HGB 13.6 08/04/2016    HCT 40.7 08/04/2016    MCV 91.7 08/04/2016    MCH 30.6 08/04/2016    MCHC 33.4 08/04/2016

## 2017-11-20 NOTE — PROGRESS NOTES
Phone: [address]      Fax: [phone]                            Outpatient Physical Therapy                                                                            Daily Note    Date: 2017  Patient Name: Nayely Fontanez        MRN: [id number]   ACCT#:  [de-identified]  : 1924  (80 y.o.)    Referring Practitioner: Dr Delisa Alexis    Referral Date : 10/25/17    Diagnosis: Paresthesia left leg  Treatment Diagnosis: left leg pain, back pain    Onset Date: 17  PT Insurance Information: [facility]  Total # of Visits Approved: 10 Per Physician Order  Total # of Visits to Date: 6    Pre-Treatment Pain:  5/10     Assessment  Assessment: Reviewed HEP, paatient independnt and reports compliance. Strength left hip abd 4+/5. Patient was able to squat and  5 cones safely without arm support. .  Patient reports no pain for 1-2 days after PT, then pain returned. , 5/10 today at start of session. Patient reports 0/10 pain at end of PT session. Chart Reviewed: Yes    Plan  Plan: Continue with current plan    Exercises/Modalities/Manual:  See DocFlow Sheet    Education:         Goals  (Total # of Visits to Date: 6)   Short Term Goals - Time Frame for Short term goals: 8  Short term goal 1: Patient to be independent with HEP for low back stretches and trunk stability-MET  Short term goal 2:  Increase strength left hip abd 4+/5-MET  Short term goal 3: Patient to be able to squat and  5# object from floor safely without LOB x10          Long Term Goals - Time Frame for Long term goals : 10  Long term goal 1: Decrease pain 2/10 left leg x 3 days  Long term goal 2: Improve mobility with score of 40/80 or better on LEFS  Long term goal 3: Patient to be able to alternate feet up and down stairs safely          Post Treatment Pain:  0/10    Time In: 15:10    Time Out : 15:45        Timed Code Treatment Minutes: 35 Minutes  Total Treatment Time: Elsa Colindres

## 2017-11-21 RX ORDER — OMEPRAZOLE 20 MG/1
20 CAPSULE, DELAYED RELEASE ORAL DAILY
Qty: 30 CAPSULE | Refills: 1 | Status: SHIPPED | OUTPATIENT
Start: 2017-11-21 | End: 2018-03-12 | Stop reason: SDUPTHER

## 2017-11-21 NOTE — TELEPHONE ENCOUNTER
Patient is requesting a refill on Omeprazole 20 mg take 1 capsule by mouth daily.       [address] Maintenance   Topic Date Due    DTaP/Tdap/Td vaccine (1 - Tdap) 12/12/1943    Zostavax vaccine  12/12/1984    Flu vaccine  Completed    Pneumococcal low/med risk  Completed             (applicable per patient's age: Cancer Screenings, Depression Screening, Fall Risk Screening, Immunizations)    LDL Cholesterol (mg/dL)   Date Value   09/12/2016 138 (H)     AST (U/L)   Date Value   09/12/2016 20     ALT (U/L)   Date Value   09/12/2016 14     BUN (mg/dL)   Date Value   09/12/2016 22      (goal A1C is < 7)   (goal LDL is <100) need 30-50% reduction from baseline     BP Readings from Last 3 Encounters:   11/20/17 120/62   10/25/17 (!) 102/54   10/12/17 120/64    (goal /80)      All Future Testing planned in [facility]:  Lab Frequency Next Occurrence       Next Visit Date:  Future Appointments  Date Time Provider Damon Cesar   11/22/2017 1:00 PM Birdena Severe, PT [facility]   11/28/2017 1:45 PM Birdena Severe, PT [facility] PT [facility]   11/30/2017 1:45 PM Yousifena Severe, PT [facility]   5/22/2018 9:30 AM [facility]            Patient Active Problem List:     Esophageal reflux     Diaphragmatic hernia     Essential hypertension, benign     Pure hypercholesterolemia     Contact dermatitis     Lower urinary tract infectious disease     Cholelithiases     Cholangitis     Endometrial thickening on ultra sound     Cervical stenosis (uterine cervix)

## 2017-11-22 ENCOUNTER — HOSPITAL ENCOUNTER (OUTPATIENT)
Dept: PHYSICAL THERAPY | Age: 82
Setting detail: THERAPIES SERIES
Discharge: HOME | End: 2017-11-22
Payer: MEDICARE

## 2017-11-22 PROCEDURE — 97110 THERAPEUTIC EXERCISES: CPT

## 2017-11-22 ASSESSMENT — PAIN SCALES - GENERAL: PAINLEVEL_OUTOF10: 0

## 2017-11-28 ENCOUNTER — HOSPITAL ENCOUNTER (OUTPATIENT)
Dept: PHYSICAL THERAPY | Age: 82
Setting detail: THERAPIES SERIES
Discharge: HOME | End: 2017-11-28
Payer: MEDICARE

## 2017-11-28 PROCEDURE — 97140 MANUAL THERAPY 1/> REGIONS: CPT

## 2017-11-28 PROCEDURE — 97110 THERAPEUTIC EXERCISES: CPT

## 2017-11-30 ENCOUNTER — HOSPITAL ENCOUNTER (OUTPATIENT)
Dept: PHYSICAL THERAPY | Age: 82
Setting detail: THERAPIES SERIES
Discharge: HOME | End: 2017-11-30
Payer: MEDICARE

## 2017-11-30 PROCEDURE — 97140 MANUAL THERAPY 1/> REGIONS: CPT

## 2017-11-30 PROCEDURE — 97110 THERAPEUTIC EXERCISES: CPT

## 2017-11-30 ASSESSMENT — PAIN DESCRIPTION - LOCATION: LOCATION: LEG

## 2017-11-30 ASSESSMENT — PAIN DESCRIPTION - ORIENTATION: ORIENTATION: LEFT

## 2017-11-30 ASSESSMENT — PAIN SCALES - GENERAL: PAINLEVEL_OUTOF10: 2

## 2017-11-30 NOTE — PROGRESS NOTES
Phone: [address]      Fax: [phone]                            Outpatient Physical Therapy                                                                            Daily Note    Date: 2017  Patient Name: Adriana Mancilla        MRN: [id number]   ACCT#:  [de-identified]  : 1924  (80 y.o.)    Referring Practitioner: Dr Andrea Culver    Referral Date : 10/25/17    Diagnosis: Paresthesia left leg  Treatment Diagnosis: left leg pain, back pain    Onset Date: 17  PT Insurance Information: [facility]  Total # of Visits Approved: 10 Per Physician Order  Total # of Visits to Date: 9    Pre-Treatment Pain:  2/10     Assessment  Assessment: Patient reports 0/10 pain currently, pain intermittent 0-3/10 in left lateral thigh/ hip area with walking. Patient able to squat and lift 5# safely. She alternated feet up and down stairs WFL. Sudheer Arthur patient reports she was able to go shopping at store with gait WFL, no problems. Pain2/10 left leg per patient; overall much better per patient. Patient reports pain is not interfering with normal activities. Plan to discharge next sessionb. Chart Reviewed: Yes    Plan  Plan: Continue with current plan    Exercises/Modalities/Manual:  See DocFlow Sheet    Education:         Goals  (Total # of Visits to Date: 5)   Short Term Goals - Time Frame for Short term goals: 8  Short term goal 1: Patient to be independent with HEP for low back stretches and trunk stability-MET  Short term goal 2:  Increase strength left hip abd 4+/5-MET  Short term goal 3: Patient to be able to squat and  5# object from floor safely without LOB x10-Met          Long Term Goals - Time Frame for Long term goals : 10  Long term goal 1: Decrease pain 2/10 left leg x 3 days  Long term goal 2: Improve mobility with score of 40/80 or better on LEFS  Long term goal 3: Patient to be able to alternate feet up and down stairs safely-Met          Post Treatment Pain: 0/10    Time In: 13:40    Time Out : 14:20        Timed Code Treatment Minutes: 40 Minutes  Total Treatment Time: 2 [facility] License Number: [id number]    Date: 11/30/2017

## 2017-12-05 ENCOUNTER — APPOINTMENT (OUTPATIENT)
Dept: PHYSICAL THERAPY | Age: 82
End: 2017-12-05
Payer: MEDICARE

## 2017-12-06 ENCOUNTER — HOSPITAL ENCOUNTER (OUTPATIENT)
Dept: PHYSICAL THERAPY | Age: 82
Setting detail: THERAPIES SERIES
Discharge: HOME | End: 2017-12-06
Payer: MEDICARE

## 2017-12-06 PROCEDURE — 97140 MANUAL THERAPY 1/> REGIONS: CPT

## 2017-12-06 PROCEDURE — G8979 MOBILITY GOAL STATUS: HCPCS

## 2017-12-06 PROCEDURE — 97110 THERAPEUTIC EXERCISES: CPT

## 2017-12-06 PROCEDURE — G8980 MOBILITY D/C STATUS: HCPCS

## 2017-12-06 ASSESSMENT — PAIN SCALES - GENERAL: PAINLEVEL_OUTOF10: 0

## 2017-12-06 NOTE — PROGRESS NOTES
Phone: [address]      Fax: [phone]                            Outpatient Physical Therapy                                                                            Daily Note    Date: 2017  Patient Name: King Freddie        MRN: [id number]   ACCT#:  [de-identified]  : 1924  (80 y.o.)    Referring Practitioner: Dr Sandra Garcia    Referral Date : 10/25/17    Diagnosis: Paresthesia left leg  Treatment Diagnosis: left leg pain, back pain    Onset Date: 17  PT Insurance Information: [facility]  Total # of Visits Approved: 10 Per Physician Order  Total # of Visits to Date: 10    Pre-Treatment Pain:  0/10     Assessment  Assessment: Pain 0/10 x 3-4 days per patient. Trunk ROM WFL. Strength B LE WFL. She is able to squat and lift 5 pounds without problems. She can alternate feet on stairs WFL. LEFS score of 56/80. Met all goals and discharged. Chart Reviewed: Yes    Plan  Plan: Discharge    Exercises/Modalities/Manual:  See DocFlow Sheet    Education:           Goals  (Total # of Visits to Date: 10)   Short Term Goals - Time Frame for Short term goals: 8  Short term goal 1: Patient to be independent with HEP for low back stretches and trunk stability-MET  Short term goal 2:  Increase strength left hip abd 4+/5-MET  Short term goal 3: Patient to be able to squat and  5# object from floor safely without LOB x10-Met          Long Term Goals - Time Frame for Long term goals : 10  Long term goal 1: Decrease pain 2/10 left leg x 3 days-MET  Long term goal 2: Improve mobility with score of 40/80 or better on LEFS-Met  Long term goal 3: Patient to be able to alternate feet up and down stairs safely-Met          Post Treatment Pain:  0/10    Time In: 9:00    Time Out : 9:40        Timed Code Treatment Minutes: 40 Minutes  Total Treatment Time: 2 [facility] License Number: [id number]    Date: 2017

## 2018-01-12 RX ORDER — NAPROXEN 500 MG/1
500 TABLET ORAL 2 TIMES DAILY WITH MEALS
Qty: 180 TABLET | Refills: 0 | Status: SHIPPED | OUTPATIENT
Start: 2018-01-12 | End: 2018-07-05 | Stop reason: SDUPTHER

## 2018-01-12 NOTE — TELEPHONE ENCOUNTER
Naproxen 500 mg    [facility]      Health Maintenance   Topic Date Due    DTaP/Tdap/Td vaccine (1 - Tdap) 12/12/1943    Zostavax vaccine  12/12/1984    Potassium monitoring  09/12/2017    Creatinine monitoring  09/12/2017    Flu vaccine  Completed    Pneumococcal low/med risk  Completed             (applicable per patient's age: Cancer Screenings, Depression Screening, Fall Risk Screening, Immunizations)    LDL Cholesterol (mg/dL)   Date Value   09/12/2016 138 (H)     AST (U/L)   Date Value   09/12/2016 20     ALT (U/L)   Date Value   09/12/2016 14     BUN (mg/dL)   Date Value   09/12/2016 22      (goal A1C is < 7)   (goal LDL is <100) need 30-50% reduction from baseline     BP Readings from Last 3 Encounters:   11/20/17 120/62   10/25/17 (!) 102/54   10/12/17 120/64    (goal /80)      All Future Testing planned in [facility]:  Lab Frequency Next Occurrence       Next Visit Date:  Future Appointments  Date Time Provider Damon Cesar   5/22/2018 9:30 AM MD Narcisa Phoenix [facility]            Patient Active Problem List:     Esophageal reflux     Diaphragmatic hernia     Essential hypertension, benign     Pure hypercholesterolemia     Contact dermatitis     Lower urinary tract infectious disease     Cholelithiases     Cholangitis     Endometrial thickening on ultra sound     Cervical stenosis (uterine cervix)

## 2018-02-20 ENCOUNTER — OFFICE VISIT (OUTPATIENT)
Dept: FAMILY MEDICINE CLINIC | Age: 83
End: 2018-02-20
Payer: MEDICARE

## 2018-02-20 VITALS
WEIGHT: 171 LBS | OXYGEN SATURATION: 96 % | SYSTOLIC BLOOD PRESSURE: 132 MMHG | BODY MASS INDEX: 29.35 KG/M2 | DIASTOLIC BLOOD PRESSURE: 68 MMHG | HEART RATE: 66 BPM

## 2018-02-20 DIAGNOSIS — M47.819 ARTHRITIS OF LOW BACK: ICD-10-CM

## 2018-02-20 DIAGNOSIS — G47.09 OTHER INSOMNIA: ICD-10-CM

## 2018-02-20 DIAGNOSIS — R19.4 BOWEL HABIT CHANGES: ICD-10-CM

## 2018-02-20 DIAGNOSIS — I10 ESSENTIAL HYPERTENSION, BENIGN: Primary | ICD-10-CM

## 2018-02-20 PROCEDURE — 99214 OFFICE O/P EST MOD 30 MIN: CPT | Performed by: FAMILY MEDICINE

## 2018-02-20 PROCEDURE — G8427 DOCREV CUR MEDS BY ELIG CLIN: HCPCS | Performed by: FAMILY MEDICINE

## 2018-02-20 PROCEDURE — G8482 FLU IMMUNIZE ORDER/ADMIN: HCPCS | Performed by: FAMILY MEDICINE

## 2018-02-20 PROCEDURE — G8417 CALC BMI ABV UP PARAM F/U: HCPCS | Performed by: FAMILY MEDICINE

## 2018-02-20 PROCEDURE — 1036F TOBACCO NON-USER: CPT | Performed by: FAMILY MEDICINE

## 2018-02-20 PROCEDURE — 4040F PNEUMOC VAC/ADMIN/RCVD: CPT | Performed by: FAMILY MEDICINE

## 2018-02-20 PROCEDURE — 1090F PRES/ABSN URINE INCON ASSESS: CPT | Performed by: FAMILY MEDICINE

## 2018-02-20 PROCEDURE — 1123F ACP DISCUSS/DSCN MKR DOCD: CPT | Performed by: FAMILY MEDICINE

## 2018-02-20 RX ORDER — HYDROCHLOROTHIAZIDE 12.5 MG/1
12.5 CAPSULE, GELATIN COATED ORAL DAILY
Qty: 90 CAPSULE | Refills: 1 | Status: SHIPPED | OUTPATIENT
Start: 2018-02-20 | End: 2018-08-15 | Stop reason: SDUPTHER

## 2018-02-20 RX ORDER — HYDROCHLOROTHIAZIDE 25 MG/1
25 TABLET ORAL DAILY
Qty: 90 TABLET | Refills: 1 | Status: CANCELLED | OUTPATIENT
Start: 2018-02-20

## 2018-02-20 ASSESSMENT — ENCOUNTER SYMPTOMS
BLOOD IN STOOL: 0
DIARRHEA: 0
COUGH: 0
NAUSEA: 0
ABDOMINAL PAIN: 0
BACK PAIN: 1
EYE REDNESS: 0
FACIAL SWELLING: 0
CONSTIPATION: 0
SHORTNESS OF BREATH: 0
VOMITING: 0
EYE DISCHARGE: 0

## 2018-02-20 NOTE — PROGRESS NOTES
HPI Notes    Name: Chikis Machado  : 1924         Chief Complaint:     Chief Complaint   Patient presents with    Hypertension     Pt states she has been taking HCTZ 25 mg 1/2 tablet for the past several months, Pt is questioning if she is supposed to be weaning off ??     Other     changes in bowel habit. Pt states over the past month she has a BM daily, soft formed, normal color. Pt states she used to go every 3 - 4 days and now she goes daily       History of Present Illness:      Chikis Machado is a 80 y.o.  female who presents with Hypertension (Pt states she has been taking HCTZ 25 mg 1/2 tablet for the past several months, Pt is questioning if she is supposed to be weaning off ?? ) and Other (changes in bowel habit. Pt states over the past month she has a BM daily, soft formed, normal color. Pt states she used to go every 3 - 4 days and now she goes daily)      Hypertension   This is a chronic problem. The current episode started more than 1 year ago. The problem is unchanged. The problem is controlled. Pertinent negatives include no chest pain, headaches, palpitations, peripheral edema or shortness of breath. There are no associated agents to hypertension. Risk factors for coronary artery disease include post-menopausal state. Past treatments include diuretics. The current treatment provides significant improvement. Bowel changes-  Pt states her bowels are now every day. In past she only went every 2-3d. However, the stool is soft and brown. No blood in stool and no black stool. No abdominal pain but occ cramps. Pt had a full work up summer  including endometrial bx and pelvic U/S. Insomnia - Pt complains of not being able to fall asleep at night. Pt has been like this for awhile as it has gotten worse as she gets older. Pt then gets asleep then wakes up to go to the bathroom. Pt does \"doze off\" during the day  watching TV or reading.      Back arthritis - Pt has had low back 09/12/2016    K 3.7 09/12/2016     09/12/2016    CO2 28 09/12/2016    BUN 22 09/12/2016    CREATININE 0.82 09/12/2016    GLUCOSE 104 09/12/2016    GLUCOSE 103 06/06/2012    PROT 6.7 09/12/2016    LABALBU 3.7 09/12/2016    LABALBU 4.1 06/06/2012    BILITOT 0.62 09/12/2016    ALKPHOS 96 09/12/2016    AST 20 09/12/2016    ALT 14 09/12/2016     Lab Results   Component Value Date    WBC 5.9 08/04/2016    RBC 4.44 08/04/2016    RBC 4.73 12/14/2011    HGB 13.6 08/04/2016    HCT 40.7 08/04/2016    MCV 91.7 08/04/2016    MCH 30.6 08/04/2016    MCHC 33.4 08/04/2016    RDW 15.1 08/04/2016     08/04/2016     12/14/2011    MPV NOT REPORTED 08/04/2016     Lab Results   Component Value Date    TSH 2.36 10/25/2012     Lab Results   Component Value Date    CHOL 216 09/12/2016    HDL 56 09/12/2016          Assessment/Plan:       1. Essential hypertension, benign  Pt is doing well and needs to continue on the HCTZ 12.5mg one tablet daily    2. Bowel habit changes  Pt reassured she had a pelvic u/s and uterus bx in 2016 and that she should just continue to monitor her stool and diet. F/u if pt sees blood in stool and abdominal exam is benign. 3. Other insomnia  Stable and pt doesn't want to take any thing to sleep. She sleeps some during the day. 4. Arthritis of low back  Back pain is better and pt finished PT so encouraged to do the exercises at home and take the naprosyn at home with food.      Keep regular appt in 3mos        Electronically signed by Geovany Pizarro MD on 2/20/2018 at 8:52 AM

## 2018-02-20 NOTE — PATIENT INSTRUCTIONS
SURVEY:    You may be receiving a survey from [employer] regarding your visit today. Please complete the survey to enable us to provide the highest quality of care to you and your family. If you cannot score us a very good on any question, please call the office to discuss how we could of made your experience a very good one. Thank you.

## 2018-03-12 ENCOUNTER — TELEPHONE (OUTPATIENT)
Dept: FAMILY MEDICINE CLINIC | Age: 83
End: 2018-03-12

## 2018-03-12 DIAGNOSIS — M47.819 ARTHRITIS OF LOW BACK: Primary | ICD-10-CM

## 2018-03-12 RX ORDER — OMEPRAZOLE 20 MG/1
20 CAPSULE, DELAYED RELEASE ORAL DAILY
Qty: 30 CAPSULE | Refills: 2 | Status: SHIPPED | OUTPATIENT
Start: 2018-03-12 | End: 2018-06-18 | Stop reason: SDUPTHER

## 2018-03-15 NOTE — TELEPHONE ENCOUNTER
Yes ok to print handicap placard. Please print out and I will sign tomorrow when I am in the office.

## 2018-03-19 ENCOUNTER — TELEPHONE (OUTPATIENT)
Dept: FAMILY MEDICINE CLINIC | Age: 83
End: 2018-03-19

## 2018-03-19 DIAGNOSIS — M47.819 ARTHRITIS, LOW BACK: Primary | ICD-10-CM

## 2018-03-19 NOTE — TELEPHONE ENCOUNTER
Needs a new handicap placard. Can she  tomorrow ?     Next Visit Date:  Future Appointments  Date Time Provider Damon Osborni   5/22/2018 9:30 AM Clarisa Chow MD [facility]       Health Maintenance   Topic Date Due    DTaP/Tdap/Td vaccine (1 - Tdap) 12/12/1943    Shingles Vaccine (1 of 2 - 2 Dose Series) 12/12/1974    Potassium monitoring  09/12/2017    Creatinine monitoring  09/12/2017    Flu vaccine  Completed    Pneumococcal low/med risk  Completed       No results found for: LABA1C          ( goal A1C is < 7)   No results found for: LABMICR  LDL Cholesterol (mg/dL)   Date Value   09/12/2016 138 (H)       (goal LDL is <100)   AST (U/L)   Date Value   09/12/2016 20     ALT (U/L)   Date Value   09/12/2016 14     BUN (mg/dL)   Date Value   09/12/2016 22     BP Readings from Last 3 Encounters:   02/20/18 132/68   11/20/17 120/62   10/25/17 (!) 102/54          (goal 120/80)    All Future Testing planned in [facility]  Lab Frequency Next Occurrence               Patient Active Problem List:     Esophageal reflux     Diaphragmatic hernia     Essential hypertension, benign     Pure hypercholesterolemia     Lower urinary tract infectious disease     Cholelithiases     Cholangitis     Endometrial thickening on ultra sound     Cervical stenosis (uterine cervix)     Arthritis of low back

## 2018-05-24 ENCOUNTER — OFFICE VISIT (OUTPATIENT)
Dept: FAMILY MEDICINE CLINIC | Age: 83
End: 2018-05-24
Payer: MEDICARE

## 2018-05-24 VITALS
WEIGHT: 172.2 LBS | HEART RATE: 58 BPM | BODY MASS INDEX: 29.4 KG/M2 | TEMPERATURE: 98 F | DIASTOLIC BLOOD PRESSURE: 70 MMHG | OXYGEN SATURATION: 96 % | HEIGHT: 64 IN | SYSTOLIC BLOOD PRESSURE: 126 MMHG

## 2018-05-24 DIAGNOSIS — K21.9 GASTROESOPHAGEAL REFLUX DISEASE WITHOUT ESOPHAGITIS: ICD-10-CM

## 2018-05-24 DIAGNOSIS — I10 ESSENTIAL HYPERTENSION, BENIGN: Primary | ICD-10-CM

## 2018-05-24 DIAGNOSIS — R39.81 FUNCTIONAL URINARY INCONTINENCE: ICD-10-CM

## 2018-05-24 PROCEDURE — 99214 OFFICE O/P EST MOD 30 MIN: CPT | Performed by: FAMILY MEDICINE

## 2018-05-24 PROCEDURE — G8427 DOCREV CUR MEDS BY ELIG CLIN: HCPCS | Performed by: FAMILY MEDICINE

## 2018-05-24 PROCEDURE — 1090F PRES/ABSN URINE INCON ASSESS: CPT | Performed by: FAMILY MEDICINE

## 2018-05-24 PROCEDURE — 1036F TOBACCO NON-USER: CPT | Performed by: FAMILY MEDICINE

## 2018-05-24 PROCEDURE — 1123F ACP DISCUSS/DSCN MKR DOCD: CPT | Performed by: FAMILY MEDICINE

## 2018-05-24 PROCEDURE — 4040F PNEUMOC VAC/ADMIN/RCVD: CPT | Performed by: FAMILY MEDICINE

## 2018-05-24 PROCEDURE — G8417 CALC BMI ABV UP PARAM F/U: HCPCS | Performed by: FAMILY MEDICINE

## 2018-05-24 ASSESSMENT — PATIENT HEALTH QUESTIONNAIRE - PHQ9
1. LITTLE INTEREST OR PLEASURE IN DOING THINGS: 0
SUM OF ALL RESPONSES TO PHQ9 QUESTIONS 1 & 2: 0
2. FEELING DOWN, DEPRESSED OR HOPELESS: 0
SUM OF ALL RESPONSES TO PHQ QUESTIONS 1-9: 0

## 2018-05-24 ASSESSMENT — ENCOUNTER SYMPTOMS
EYE REDNESS: 0
VOMITING: 0
EYE DISCHARGE: 0
DIARRHEA: 0
NAUSEA: 0
CONSTIPATION: 0
BLOOD IN STOOL: 0
ABDOMINAL PAIN: 0
SORE THROAT: 0
COUGH: 0
SHORTNESS OF BREATH: 0

## 2018-06-18 ENCOUNTER — APPOINTMENT (OUTPATIENT)
Dept: CT IMAGING | Age: 83
End: 2018-06-18
Payer: MEDICARE

## 2018-06-18 ENCOUNTER — HOSPITAL ENCOUNTER (EMERGENCY)
Age: 83
Discharge: HOME | End: 2018-06-18
Attending: EMERGENCY MEDICINE
Payer: MEDICARE

## 2018-06-18 VITALS
RESPIRATION RATE: 18 BRPM | HEIGHT: 66 IN | TEMPERATURE: 98.6 F | SYSTOLIC BLOOD PRESSURE: 151 MMHG | BODY MASS INDEX: 26.52 KG/M2 | WEIGHT: 165 LBS | DIASTOLIC BLOOD PRESSURE: 55 MMHG

## 2018-06-18 DIAGNOSIS — S01.81XA LACERATION OF FOREHEAD, INITIAL ENCOUNTER: Primary | ICD-10-CM
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
## 2018-06-18 PROCEDURE — 70450 CT HEAD/BRAIN W/O DYE: CPT

## 2018-06-18 PROCEDURE — 12013 RPR F/E/E/N/L/M 2.6-5.0 CM: CPT

## 2018-06-18 PROCEDURE — 99283 EMERGENCY DEPT VISIT LOW MDM: CPT

## 2018-06-18 PROCEDURE — 90715 TDAP VACCINE 7 YRS/> IM: CPT | Performed by: EMERGENCY MEDICINE

## 2018-06-18 PROCEDURE — 2500000003 HC RX 250 WO HCPCS: Performed by: EMERGENCY MEDICINE

## 2018-06-18 PROCEDURE — 6360000002 HC RX W HCPCS: Performed by: EMERGENCY MEDICINE

## 2018-06-18 PROCEDURE — 90471 IMMUNIZATION ADMIN: CPT | Performed by: EMERGENCY MEDICINE

## 2018-06-18 RX ORDER — OMEPRAZOLE 20 MG/1
20 CAPSULE, DELAYED RELEASE ORAL DAILY
Qty: 90 CAPSULE | Refills: 1 | Status: SHIPPED | OUTPATIENT
Start: 2018-06-18 | End: 2018-10-01 | Stop reason: SDUPTHER

## 2018-06-18 RX ORDER — LIDOCAINE HYDROCHLORIDE 10 MG/ML
5 INJECTION, SOLUTION INFILTRATION; PERINEURAL ONCE
Status: COMPLETED | OUTPATIENT
Start: 2018-06-18 | End: 2018-06-18

## 2018-06-18 RX ADMIN — LIDOCAINE HYDROCHLORIDE 5 ML: 10 INJECTION, SOLUTION INFILTRATION; PERINEURAL at 20:15

## 2018-06-18 RX ADMIN — TETANUS TOXOID, REDUCED DIPHTHERIA TOXOID AND ACELLULAR PERTUSSIS VACCINE, ADSORBED 0.5 ML: 5; 2.5; 8; 8; 2.5 SUSPENSION INTRAMUSCULAR at 21:06

## 2018-06-18 ASSESSMENT — PAIN DESCRIPTION - ORIENTATION
ORIENTATION: LEFT
ORIENTATION: LEFT

## 2018-06-18 ASSESSMENT — PAIN SCALES - GENERAL
PAINLEVEL_OUTOF10: 4

## 2018-06-18 ASSESSMENT — PAIN DESCRIPTION - FREQUENCY
FREQUENCY: CONTINUOUS
FREQUENCY: CONTINUOUS

## 2018-06-18 ASSESSMENT — PAIN DESCRIPTION - PROGRESSION: CLINICAL_PROGRESSION: NOT CHANGED

## 2018-06-18 ASSESSMENT — PAIN DESCRIPTION - DESCRIPTORS
DESCRIPTORS: BURNING
DESCRIPTORS: SORE

## 2018-06-18 ASSESSMENT — PAIN DESCRIPTION - ONSET: ONSET: OTHER (COMMENT)

## 2018-06-18 ASSESSMENT — PAIN DESCRIPTION - PAIN TYPE: TYPE: ACUTE PAIN

## 2018-06-18 ASSESSMENT — PAIN DESCRIPTION - LOCATION: LOCATION: HEAD

## 2018-06-27 ENCOUNTER — OFFICE VISIT (OUTPATIENT)
Dept: FAMILY MEDICINE CLINIC | Age: 83
End: 2018-06-27
Payer: MEDICARE

## 2018-06-27 VITALS
WEIGHT: 171 LBS | HEART RATE: 64 BPM | BODY MASS INDEX: 27.6 KG/M2 | SYSTOLIC BLOOD PRESSURE: 120 MMHG | DIASTOLIC BLOOD PRESSURE: 60 MMHG | OXYGEN SATURATION: 96 %

## 2018-06-27 DIAGNOSIS — Z48.02 VISIT FOR SUTURE REMOVAL: Primary | ICD-10-CM

## 2018-06-27 DIAGNOSIS — R60.0 LOWER LEG EDEMA: ICD-10-CM

## 2018-06-27 PROCEDURE — 4040F PNEUMOC VAC/ADMIN/RCVD: CPT | Performed by: FAMILY MEDICINE

## 2018-06-27 PROCEDURE — 99213 OFFICE O/P EST LOW 20 MIN: CPT | Performed by: FAMILY MEDICINE

## 2018-06-27 PROCEDURE — G8427 DOCREV CUR MEDS BY ELIG CLIN: HCPCS | Performed by: FAMILY MEDICINE

## 2018-06-27 PROCEDURE — G8417 CALC BMI ABV UP PARAM F/U: HCPCS | Performed by: FAMILY MEDICINE

## 2018-06-27 PROCEDURE — 1090F PRES/ABSN URINE INCON ASSESS: CPT | Performed by: FAMILY MEDICINE

## 2018-06-27 PROCEDURE — 1036F TOBACCO NON-USER: CPT | Performed by: FAMILY MEDICINE

## 2018-06-27 PROCEDURE — 1123F ACP DISCUSS/DSCN MKR DOCD: CPT | Performed by: FAMILY MEDICINE

## 2018-06-28 ASSESSMENT — ENCOUNTER SYMPTOMS
EYE PAIN: 0
VOMITING: 0
NAUSEA: 0
SHORTNESS OF BREATH: 0
DIARRHEA: 0
COUGH: 0
EYE DISCHARGE: 0

## 2018-07-05 RX ORDER — NAPROXEN 500 MG/1
500 TABLET ORAL 2 TIMES DAILY WITH MEALS
Qty: 180 TABLET | Refills: 1 | Status: SHIPPED | OUTPATIENT
Start: 2018-07-05 | End: 2018-11-27 | Stop reason: SDUPTHER

## 2018-07-05 NOTE — TELEPHONE ENCOUNTER
Patient asking for a new script for Naprosyn - patient uses [address] Maintenance   Topic Date Due    Shingles Vaccine (1 of 2 - 2 Dose Series) 12/12/1974    Potassium monitoring  09/12/2017    Creatinine monitoring  09/12/2017    Flu vaccine (1) 09/01/2018    DTaP/Tdap/Td vaccine (2 - Td) 06/18/2028    Pneumococcal low/med risk  Completed             (applicable per patient's age: Cancer Screenings, Depression Screening, Fall Risk Screening, Immunizations)    LDL Cholesterol (mg/dL)   Date Value   09/12/2016 138 (H)     AST (U/L)   Date Value   09/12/2016 20     ALT (U/L)   Date Value   09/12/2016 14     BUN (mg/dL)   Date Value   09/12/2016 22      (goal A1C is < 7)   (goal LDL is <100) need 30-50% reduction from baseline     BP Readings from Last 3 Encounters:   06/27/18 120/60   06/18/18 (!) 151/55   05/24/18 126/70    (goal /80)      All Future Testing planned in [facility]:  Lab Frequency Next Occurrence   Comprehensive Metabolic Panel Once 10/15/5414       Next Visit Date:  Future Appointments  Date Time Provider Damon Cesar   11/27/2018 9:30 AM Ana Rajan MD [facility]            Patient Active Problem List:     Esophageal reflux     Diaphragmatic hernia     Essential hypertension, benign     Pure hypercholesterolemia     Lower urinary tract infectious disease     Cholelithiases     Cholangitis     Endometrial thickening on ultra sound     Cervical stenosis (uterine cervix)     Arthritis of low back

## 2018-08-15 RX ORDER — HYDROCHLOROTHIAZIDE 12.5 MG/1
12.5 CAPSULE, GELATIN COATED ORAL DAILY
Qty: 90 CAPSULE | Refills: 1 | Status: SHIPPED | OUTPATIENT
Start: 2018-08-15 | End: 2018-11-27 | Stop reason: SDUPTHER

## 2018-10-01 RX ORDER — OMEPRAZOLE 20 MG/1
20 CAPSULE, DELAYED RELEASE ORAL DAILY
Qty: 90 CAPSULE | Refills: 1 | Status: SHIPPED | OUTPATIENT
Start: 2018-10-01 | End: 2019-04-15 | Stop reason: SDUPTHER

## 2018-11-27 ENCOUNTER — OFFICE VISIT (OUTPATIENT)
Dept: FAMILY MEDICINE CLINIC | Age: 83
End: 2018-11-27
Payer: MEDICARE

## 2018-11-27 VITALS
BODY MASS INDEX: 26.15 KG/M2 | OXYGEN SATURATION: 96 % | SYSTOLIC BLOOD PRESSURE: 120 MMHG | WEIGHT: 162 LBS | DIASTOLIC BLOOD PRESSURE: 60 MMHG | HEART RATE: 72 BPM

## 2018-11-27 DIAGNOSIS — E78.00 PURE HYPERCHOLESTEROLEMIA: ICD-10-CM

## 2018-11-27 DIAGNOSIS — I10 ESSENTIAL HYPERTENSION, BENIGN: Primary | ICD-10-CM

## 2018-11-27 DIAGNOSIS — K21.9 GASTROESOPHAGEAL REFLUX DISEASE WITHOUT ESOPHAGITIS: ICD-10-CM

## 2018-11-27 PROCEDURE — 1101F PT FALLS ASSESS-DOCD LE1/YR: CPT | Performed by: FAMILY MEDICINE

## 2018-11-27 PROCEDURE — G8417 CALC BMI ABV UP PARAM F/U: HCPCS | Performed by: FAMILY MEDICINE

## 2018-11-27 PROCEDURE — 1036F TOBACCO NON-USER: CPT | Performed by: FAMILY MEDICINE

## 2018-11-27 PROCEDURE — 1090F PRES/ABSN URINE INCON ASSESS: CPT | Performed by: FAMILY MEDICINE

## 2018-11-27 PROCEDURE — 99214 OFFICE O/P EST MOD 30 MIN: CPT | Performed by: FAMILY MEDICINE

## 2018-11-27 PROCEDURE — 4040F PNEUMOC VAC/ADMIN/RCVD: CPT | Performed by: FAMILY MEDICINE

## 2018-11-27 PROCEDURE — G8427 DOCREV CUR MEDS BY ELIG CLIN: HCPCS | Performed by: FAMILY MEDICINE

## 2018-11-27 PROCEDURE — 1123F ACP DISCUSS/DSCN MKR DOCD: CPT | Performed by: FAMILY MEDICINE

## 2018-11-27 PROCEDURE — G8482 FLU IMMUNIZE ORDER/ADMIN: HCPCS | Performed by: FAMILY MEDICINE

## 2018-11-27 RX ORDER — HYDROCHLOROTHIAZIDE 12.5 MG/1
12.5 CAPSULE, GELATIN COATED ORAL DAILY
Qty: 90 CAPSULE | Refills: 1 | Status: SHIPPED | OUTPATIENT
Start: 2018-11-27 | End: 2019-04-15 | Stop reason: SDUPTHER

## 2018-11-27 RX ORDER — NAPROXEN 500 MG/1
500 TABLET ORAL 2 TIMES DAILY WITH MEALS
Qty: 180 TABLET | Refills: 1 | Status: SHIPPED | OUTPATIENT
Start: 2018-11-27 | End: 2019-04-15 | Stop reason: SDUPTHER

## 2018-11-27 ASSESSMENT — ENCOUNTER SYMPTOMS
DIARRHEA: 0
EYE REDNESS: 0
CONSTIPATION: 0
SHORTNESS OF BREATH: 0
HEARTBURN: 0
BLOOD IN STOOL: 0
SORE THROAT: 0
WHEEZING: 0
BLURRED VISION: 0
CHOKING: 0
VOMITING: 0
NAUSEA: 0
ABDOMINAL PAIN: 0
FACIAL SWELLING: 0
EYE DISCHARGE: 0
STRIDOR: 0
COUGH: 0

## 2018-11-27 NOTE — PROGRESS NOTES
HPI Notes    Name: Radha Fu  : 1924        Chief Complaint:     Chief Complaint   Patient presents with    Hypertension    Gastroesophageal Reflux    Hyperlipidemia       History of Present Illness:     Radha Fu is a 80 y.o.  female who presents with Hypertension; Gastroesophageal Reflux; and Hyperlipidemia      Hypertension   This is a chronic problem. The current episode started more than 1 year ago. The problem is unchanged. The problem is controlled. Pertinent negatives include no blurred vision, chest pain, headaches, palpitations, peripheral edema or shortness of breath. There are no associated agents to hypertension. Risk factors for coronary artery disease include dyslipidemia and post-menopausal state. The current treatment provides significant improvement. Gastroesophageal Reflux   She reports no abdominal pain, no chest pain, no choking, no coughing, no dysphagia, no heartburn, no nausea, no sore throat, no stridor or no wheezing. This is a chronic problem. The current episode started more than 1 year ago. The problem has been unchanged. Pertinent negatives include no fatigue, melena or weight loss. She has tried a PPI for the symptoms. The treatment provided significant relief. Hyperlipidemia   This is a chronic problem. The current episode started more than 1 year ago. The problem is controlled. Recent lipid tests were reviewed and are normal. She has no history of diabetes, hypothyroidism or nephrotic syndrome. Pertinent negatives include no chest pain or shortness of breath. She is currently on no antihyperlipidemic treatment. The current treatment provides significant improvement of lipids. Risk factors for coronary artery disease include dyslipidemia and hypertension.        Past Medical History:     Past Medical History:   Diagnosis Date    GERD (gastroesophageal reflux disease)     Hyperlipidemia     Hypertension     Urinary incontinence       Reviewed all

## 2019-01-21 ENCOUNTER — TELEPHONE (OUTPATIENT)
Dept: FAMILY MEDICINE CLINIC | Age: 84
End: 2019-01-21

## 2019-02-13 NOTE — TELEPHONE ENCOUNTER
SUBJECTIVE:   Kathrine Isaac is a 62 year old male who presents to clinic today for the following health issues:      Chest Pain      Onset: 4 days    Description (location/character/radiation/duration): left side of chest (nipple area)    Intensity:  moderate    Accompanying signs and symptoms:        Shortness of breath: no        Sweating: no        Nausea/vomitting: no        Palpitations: no        Other (fevers/chills/cough/heartburn/lightheadedness): heartburn    History (similar episodes/previous evaluation): None    Precipitating or alleviating factors:       Worse with exertion: no        Worse with breathing: no        Related to eating: not sure        Better with burping: YES    Therapies tried and outcome: aspirin- has taken 81 mg BID to TID since then  Patient has done some shoveling but he has a snowblower (has blown his driveway 3 times in the last week).  He has been taking OTC PRilosec daily for the last 5 days with good symptom relief.    Diabetes Follow-up    Patient is checking blood sugars: twice daily.    Blood sugar testing frequency justification: Uncontrolled diabetes  Results are as follows:         am - 110-115              postprandial after supper- 130's         bedtime - 130's    Diabetic concerns: None     Symptoms of hypoglycemia (low blood sugar): dizzy     Paresthesias (numbness or burning in feet) or sores: No     Date of last diabetic eye exam: 4/17/18    Diabetes Management Resources    Hyperlipidemia Follow-Up      Rate your low fat/cholesterol diet?: good    Taking statin?  Yes, no muscle aches from statin    Other lipid medications/supplements?:  none    Hypertension Follow-up      Outpatient blood pressures are not being checked at home.     Low Salt Diet: low salt    BP Readings from Last 2 Encounters:   02/13/19 137/85   11/20/18 144/77     Hemoglobin A1C (%)   Date Value   02/13/2019 7.4 (H)   11/07/2018 8.0 (H)     LDL Cholesterol Calculated (mg/dL)   Date Value  Patient already has refill on her naproxen done 7/2018  Rx pending for HCTZ   11/07/2018 32   11/17/2017 21       Problem list and histories reviewed & adjusted, as indicated.  Additional history: as documented    Patient Active Problem List   Diagnosis     Glaucoma suspect     Type 2 diabetes mellitus with retinopathy, without long-term current use of insulin, macular edema presence unspecified, unspecified laterality, unspecified retinopathy severity (H)     Hyperlipidemia LDL goal <100     Essential hypertension     Advance care planning     Diverticulosis of large intestine without hemorrhage     Obesity, Class I, BMI 30.0-34.9 (see actual BMI)     Hx of LASIK     Past Surgical History:   Procedure Laterality Date     COLONOSCOPY N/A 1/14/2016    Procedure: COLONOSCOPY;  Surgeon: Ventura Monge MD;  Location: [facility] OR     LASIK BILATERAL       ROTATOR CUFF REPAIR RT/LT  1992     SURGICAL HISTORY OF -       uvula remove        Social History     Tobacco Use     Smoking status: Never Smoker     Smokeless tobacco: Never Used   Substance Use Topics     Alcohol use: Yes     Family History   Problem Relation Age of Onset     Diabetes Mother          Current Outpatient Medications   Medication Sig Dispense Refill     aspirin 81 MG EC tablet TAKE ONE TABLET BY MOUTH EVERY DAY 90 tablet 3     atorvastatin (LIPITOR) 20 MG tablet TAKE ONE TABLET BY MOUTH EVERY DAY 90 tablet 1     blood glucose monitoring (NO BRAND SPECIFIED) test strip Use to test blood sugars once times daily or as directed 1 Box 11     glimepiride (AMARYL) 2 MG tablet Take 1 tablet (2 mg) by mouth every morning (before breakfast) In addition to 1-4 mg tab for 6 mg daily dose.  Take this for 1-2 weeks. 15 tablet 0     glimepiride (AMARYL) 4 MG tablet Take 1 tablet (4 mg) by mouth every morning (before breakfast) 90 tablet 3     hydrochlorothiazide 12.5 MG TABS tablet Take 1 tablet (12.5 mg) by mouth daily 90 tablet 3     lisinopril (PRINIVIL/ZESTRIL) 40 MG tablet Take 1 tablet (40 mg) by mouth daily 90 tablet 3      metFORMIN (GLUCOPHAGE-XR) 500 MG 24 hr tablet Take 4 tablets (2,000 mg) by mouth daily (with dinner) 360 tablet 3     methocarbamol (ROBAXIN) 500 MG tablet Take 2 tablets (1,000 mg) by mouth 3 times daily as needed for muscle spasms 30 tablet 1     naproxen (NAPROSYN) 500 MG tablet TAKE ONE TABLET BY MOUTH TWICE A DAY AS NEEDED FOR MODERATE PAIN 90 tablet 0     ORDER FOR DME, SET TO LOCAL PRINT, Equipment being ordered: wrist quick fit [id number] 1 each 0     order for DME Equipment being ordered: One touch lancets, test strips.  Patient to check sugars once times daily, 90 day supply for test strips and lancets, refill 3 times 1 Device 0     order for DME Equipment being ordered: Glucometer per insurance preference, lancets, test strips.  Patient to check sugars two times daily, 90 day supply for test strips and lancets, refill 3 times 1 Device 11     order for DME Equipment being ordered: Glucometer Accucheck Sulema, lancets, test strips.  Patient to check sugars bid times daily, 90 day supply for test strips and lancets, refill 6 times 1 Device 0     order for DME Equipment being ordered: lancets for one touch glucometer for once daily testing 1 Box 6     sitagliptin (JANUVIA) 100 MG tablet Take 1 tablet (100 mg) by mouth daily 90 tablet 1     BP Readings from Last 3 Encounters:   02/13/19 137/85   11/20/18 144/77   11/08/18 136/84    Wt Readings from Last 3 Encounters:   02/13/19 93.9 kg (207 lb)   11/08/18 93 kg (205 lb)   11/07/18 93.2 kg (205 lb 6.4 oz)                    Reviewed and updated as needed this visit by clinical staff  Tobacco  Allergies  Meds  Problems  Med Hx  Surg Hx  Fam Hx  Soc Hx        Reviewed and updated as needed this visit by Provider  Tobacco  Allergies  Meds  Problems  Med Hx  Surg Hx  Fam Hx         ROS:  Constitutional, HEENT, cardiovascular, pulmonary, gi and gu systems are negative, except as otherwise noted.    OBJECTIVE:     /85   Pulse 86   Temp 97.7  F  "(36.5  C) (Oral)   Ht 1.697 m (5' 6.8\")   Wt 93.9 kg (207 lb)   SpO2 99%   BMI 32.62 kg/m    Body mass index is 32.62 kg/m .  GENERAL: healthy, alert and no distress  EYES: Eyes grossly normal to inspection, PERRL and conjunctivae and sclerae normal  HENT: ear canals and TM's normal, nose and mouth without ulcers or lesions  NECK: no adenopathy, no asymmetry, masses, or scars and thyroid normal to palpation, no bruits  RESP: lungs clear to auscultation - no rales, rhonchi or wheezes  CV: regular rate and rhythm, normal S1 S2, no S3 or S4, no murmur, click or rub, no peripheral edema and peripheral pulses strong  ABDOMEN: soft, nontender, no hepatosplenomegaly, no masses and bowel sounds normal  MS: no gross musculoskeletal defects noted, no edema  SKIN: no suspicious lesions or rashes  NEURO: Normal strength and tone, mentation intact and speech normal  BACK: no CVA tenderness, no paralumbar tenderness  PSYCH: mentation appears normal, affect normal/bright  LYMPH: normal ant/post cervical, supraclavicular nodes    Diagnostic Test Results:  Results for orders placed or performed in visit on 02/13/19 (from the past 24 hour(s))   Hemoglobin A1c   Result Value Ref Range    Hemoglobin A1C 7.4 (H) 0 - 5.6 %       ASSESSMENT/PLAN:           BMI:   Estimated body mass index is 32.62 kg/m  as calculated from the following:    Height as of this encounter: 1.697 m (5' 6.8\").    Weight as of this encounter: 93.9 kg (207 lb).   Weight management plan: Discussed healthy diet and exercise guidelines      1. 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  Not currently having pain, DD includes GERD, MUSCULOSKELETAL strain, cardiac especially given history of poorly controlled diabetes.  Will get stress echo, reviewed ER precautions, he is to avoid strenuous exercise (shoveing) for now, stressed the importance of good glycemic and BP control, weight loss.    - EKG 12-lead complete " w/read - Clinics  - Echocardiogram Exercise Stress; Future  - Basic metabolic panel  (Ca, Cl, CO2, Creat, Gluc, K, Na, BUN)    2. Type 2 diabetes mellitus with retinopathy, without long-term current use of insulin, macular edema presence unspecified, unspecified laterality, unspecified retinopathy severity (H)  Checking A1c today, consider CDE referral  (pt hesitant to do today).  Reviewed glycemic targets, complications of poorly controlled diabetes,.discussed ways he can decrease his risks for complications.    - Hemoglobin A1c    3. Essential hypertension  Controlled, continue with sandor donato diet, weight loss efforts.    4. Obesity, Class I, BMI 30.0-34.9 (see actual BMI)  Benefits of weight loss reviewed in detail, encouraged him to cut back on the carbohydrates in the diet, consume more fruits and vegetables, drink plenty of water, avoid fruit juices, sodas, get 150 min moderate exercise/week.  Recheck weight in 6 months.      5. Hyperlipidemia LDL goal <100  Low chol diet, medication compliance reviewed.    6.  Dyspepsia  OK to continue with Prilosec 20 mg one to two times daily, avoid consuming large meals, eating late at night, stay away from alcohol, carbonated beverages, spicy foods.  Return to clinic if not improved, new, or worsening symptoms.   Work on weight loss  Regular exercise  See Patient Instructions    ABHISHEK Perry CNP  [facility]

## 2019-02-15 ENCOUNTER — APPOINTMENT (OUTPATIENT)
Dept: GENERAL RADIOLOGY | Age: 84
End: 2019-02-15
Payer: MEDICARE

## 2019-02-15 ENCOUNTER — HOSPITAL ENCOUNTER (EMERGENCY)
Age: 84
Discharge: HOME | End: 2019-02-15
Attending: EMERGENCY MEDICINE
Payer: MEDICARE

## 2019-02-15 ENCOUNTER — APPOINTMENT (OUTPATIENT)
Dept: CT IMAGING | Age: 84
End: 2019-02-15
Payer: MEDICARE

## 2019-02-15 VITALS
BODY MASS INDEX: 25.9 KG/M2 | WEIGHT: 165 LBS | SYSTOLIC BLOOD PRESSURE: 133 MMHG | HEART RATE: 84 BPM | TEMPERATURE: 97.8 F | OXYGEN SATURATION: 97 % | HEIGHT: 67 IN | DIASTOLIC BLOOD PRESSURE: 48 MMHG

## 2019-02-15 DIAGNOSIS — S42.101A CLOSED FRACTURE OF RIGHT SCAPULA, UNSPECIFIED PART OF SCAPULA, INITIAL ENCOUNTER: Primary | ICD-10-CM

## 2019-02-15 PROCEDURE — 73030 X-RAY EXAM OF SHOULDER: CPT

## 2019-02-15 PROCEDURE — 72125 CT NECK SPINE W/O DYE: CPT

## 2019-02-15 PROCEDURE — 73060 X-RAY EXAM OF HUMERUS: CPT

## 2019-02-15 PROCEDURE — 99284 EMERGENCY DEPT VISIT MOD MDM: CPT

## 2019-02-15 PROCEDURE — 6370000000 HC RX 637 (ALT 250 FOR IP): Performed by: EMERGENCY MEDICINE

## 2019-02-15 PROCEDURE — 70450 CT HEAD/BRAIN W/O DYE: CPT

## 2019-02-15 RX ORDER — HYDROCODONE BITARTRATE AND ACETAMINOPHEN 5; 325 MG/1; MG/1
1 TABLET ORAL ONCE
Status: COMPLETED | OUTPATIENT
Start: 2019-02-15 | End: 2019-02-15

## 2019-02-15 RX ORDER — HYDROCODONE BITARTRATE AND ACETAMINOPHEN 5; 325 MG/1; MG/1
1 TABLET ORAL EVERY 6 HOURS PRN
Qty: 12 TABLET | Refills: 0 | Status: SHIPPED | OUTPATIENT
Start: 2019-02-15 | End: 2019-02-18

## 2019-02-15 RX ADMIN — HYDROCODONE BITARTRATE AND ACETAMINOPHEN 1 TABLET: 5; 325 TABLET ORAL at 15:18

## 2019-02-15 ASSESSMENT — ENCOUNTER SYMPTOMS
BACK PAIN: 0
VOMITING: 0
ABDOMINAL PAIN: 0
TROUBLE SWALLOWING: 0
DIARRHEA: 0
EYE PAIN: 0
COLOR CHANGE: 0
NAUSEA: 0
EYE REDNESS: 0
SHORTNESS OF BREATH: 0
COUGH: 0
SORE THROAT: 0

## 2019-02-15 ASSESSMENT — PAIN DESCRIPTION - LOCATION: LOCATION: SHOULDER

## 2019-02-15 ASSESSMENT — PAIN SCALES - WONG BAKER: WONGBAKER_NUMERICALRESPONSE: 10

## 2019-02-15 ASSESSMENT — PAIN SCALES - GENERAL
PAINLEVEL_OUTOF10: 7
PAINLEVEL_OUTOF10: 3
PAINLEVEL_OUTOF10: 10

## 2019-02-15 ASSESSMENT — PAIN DESCRIPTION - ORIENTATION: ORIENTATION: RIGHT

## 2019-02-15 ASSESSMENT — PAIN DESCRIPTION - PAIN TYPE
TYPE: ACUTE PAIN
TYPE: ACUTE PAIN

## 2019-02-20 ENCOUNTER — HOSPITAL ENCOUNTER (OUTPATIENT)
Dept: GENERAL RADIOLOGY | Age: 84
Discharge: HOME | End: 2019-02-22
Payer: MEDICARE

## 2019-02-20 ENCOUNTER — HOSPITAL ENCOUNTER (OUTPATIENT)
Age: 84
Discharge: HOME | End: 2019-02-22
Payer: MEDICARE

## 2019-02-20 DIAGNOSIS — M25.561 RIGHT KNEE PAIN, UNSPECIFIED CHRONICITY: ICD-10-CM

## 2019-02-20 PROCEDURE — 73562 X-RAY EXAM OF KNEE 3: CPT

## 2019-03-13 ENCOUNTER — HOSPITAL ENCOUNTER (OUTPATIENT)
Dept: GENERAL RADIOLOGY | Age: 84
Discharge: HOME | End: 2019-03-15
Payer: MEDICARE

## 2019-03-13 ENCOUNTER — HOSPITAL ENCOUNTER (OUTPATIENT)
Age: 84
Discharge: HOME | End: 2019-03-15
Payer: MEDICARE

## 2019-03-13 DIAGNOSIS — S42.111D: ICD-10-CM

## 2019-03-13 DIAGNOSIS — S82.044D: ICD-10-CM

## 2019-03-13 PROCEDURE — 73030 X-RAY EXAM OF SHOULDER: CPT

## 2019-03-13 PROCEDURE — 73564 X-RAY EXAM KNEE 4 OR MORE: CPT

## 2019-03-20 ENCOUNTER — HOSPITAL ENCOUNTER (OUTPATIENT)
Dept: CT IMAGING | Age: 84
Discharge: HOME | End: 2019-03-22
Payer: MEDICARE

## 2019-03-20 ENCOUNTER — HOSPITAL ENCOUNTER (OUTPATIENT)
Age: 84
Discharge: HOME | End: 2019-03-20
Payer: MEDICARE

## 2019-03-20 DIAGNOSIS — R07.9 CHEST PAIN, UNSPECIFIED TYPE: ICD-10-CM

## 2019-03-20 LAB
EKG ATRIAL RATE: 73 BPM
EKG P AXIS: 40 DEGREES
EKG P-R INTERVAL: 172 MS
EKG Q-T INTERVAL: 402 MS
EKG QRS DURATION: 98 MS
EKG QTC CALCULATION (BAZETT): 442 MS
EKG R AXIS: -15 DEGREES
EKG T AXIS: 27 DEGREES
EKG VENTRICULAR RATE: 73 BPM

## 2019-03-20 PROCEDURE — 93005 ELECTROCARDIOGRAM TRACING: CPT

## 2019-03-20 PROCEDURE — 73200 CT UPPER EXTREMITY W/O DYE: CPT

## 2019-03-20 PROCEDURE — 71250 CT THORAX DX C-: CPT

## 2019-03-26 ENCOUNTER — TELEPHONE (OUTPATIENT)
Dept: FAMILY MEDICINE CLINIC | Age: 84
End: 2019-03-26

## 2019-03-26 DIAGNOSIS — S82.044D CLOSED NONDISPLACED COMMINUTED FRACTURE OF RIGHT PATELLA WITH ROUTINE HEALING, SUBSEQUENT ENCOUNTER: Primary | ICD-10-CM

## 2019-03-26 DIAGNOSIS — S42.114D CLOSED NONDISPLACED FRACTURE OF BODY OF RIGHT SCAPULA WITH ROUTINE HEALING, SUBSEQUENT ENCOUNTER: ICD-10-CM

## 2019-04-10 ENCOUNTER — HOSPITAL ENCOUNTER (OUTPATIENT)
Dept: GENERAL RADIOLOGY | Age: 84
Discharge: HOME | End: 2019-04-12
Payer: MEDICARE

## 2019-04-10 ENCOUNTER — HOSPITAL ENCOUNTER (OUTPATIENT)
Age: 84
Discharge: HOME | End: 2019-04-12
Payer: MEDICARE

## 2019-04-10 DIAGNOSIS — S42.111D: ICD-10-CM

## 2019-04-10 DIAGNOSIS — S82.044D: ICD-10-CM

## 2019-04-10 PROCEDURE — 73564 X-RAY EXAM KNEE 4 OR MORE: CPT

## 2019-04-10 PROCEDURE — 73030 X-RAY EXAM OF SHOULDER: CPT

## 2019-04-15 RX ORDER — OMEPRAZOLE 20 MG/1
20 CAPSULE, DELAYED RELEASE ORAL DAILY
Qty: 90 CAPSULE | Refills: 1 | Status: SHIPPED | OUTPATIENT
Start: 2019-04-15 | End: 2019-04-22 | Stop reason: SDUPTHER

## 2019-04-15 RX ORDER — HYDROCHLOROTHIAZIDE 12.5 MG/1
12.5 CAPSULE, GELATIN COATED ORAL DAILY
Qty: 90 CAPSULE | Refills: 1 | Status: SHIPPED | OUTPATIENT
Start: 2019-04-15 | End: 2019-10-29 | Stop reason: SDUPTHER

## 2019-04-15 RX ORDER — NAPROXEN 500 MG/1
500 TABLET ORAL 2 TIMES DAILY WITH MEALS
Qty: 180 TABLET | Refills: 1 | Status: SHIPPED | OUTPATIENT
Start: 2019-04-15 | End: 2019-10-29 | Stop reason: SDUPTHER

## 2019-04-22 ENCOUNTER — TELEPHONE (OUTPATIENT)
Dept: FAMILY MEDICINE CLINIC | Age: 84
End: 2019-04-22

## 2019-04-22 RX ORDER — OMEPRAZOLE 20 MG/1
20 CAPSULE, DELAYED RELEASE ORAL DAILY
Qty: 14 CAPSULE | Refills: 0 | Status: SHIPPED | OUTPATIENT
Start: 2019-04-22 | End: 2019-04-23 | Stop reason: SDUPTHER

## 2019-04-22 NOTE — TELEPHONE ENCOUNTER
Pt needs 2 week supply of omeprazole sent to [facility]. Omeprazole pending #14 pending Rashi's signature.

## 2019-04-23 RX ORDER — OMEPRAZOLE 20 MG/1
20 CAPSULE, DELAYED RELEASE ORAL DAILY
Qty: 30 CAPSULE | Refills: 5 | Status: SHIPPED | OUTPATIENT
Start: 2019-04-23 | End: 2019-10-29 | Stop reason: SDUPTHER

## 2019-05-29 ENCOUNTER — HOSPITAL ENCOUNTER (OUTPATIENT)
Age: 84
Setting detail: SPECIMEN
Discharge: HOME | End: 2019-05-29
Payer: MEDICARE

## 2019-05-29 ENCOUNTER — OFFICE VISIT (OUTPATIENT)
Dept: FAMILY MEDICINE CLINIC | Age: 84
End: 2019-05-29
Payer: MEDICARE

## 2019-05-29 VITALS
SYSTOLIC BLOOD PRESSURE: 120 MMHG | HEART RATE: 64 BPM | OXYGEN SATURATION: 93 % | WEIGHT: 169 LBS | HEIGHT: 67 IN | BODY MASS INDEX: 26.53 KG/M2 | DIASTOLIC BLOOD PRESSURE: 60 MMHG

## 2019-05-29 DIAGNOSIS — M47.819 ARTHRITIS OF LOW BACK: ICD-10-CM

## 2019-05-29 DIAGNOSIS — I10 ESSENTIAL HYPERTENSION, BENIGN: Primary | ICD-10-CM

## 2019-05-29 DIAGNOSIS — K21.9 GASTROESOPHAGEAL REFLUX DISEASE WITHOUT ESOPHAGITIS: ICD-10-CM

## 2019-05-29 DIAGNOSIS — R35.0 URINARY FREQUENCY: ICD-10-CM

## 2019-05-29 DIAGNOSIS — R22.43 LOCALIZED SWELLING OF BOTH LOWER LEGS: ICD-10-CM

## 2019-05-29 DIAGNOSIS — E78.00 PURE HYPERCHOLESTEROLEMIA: ICD-10-CM

## 2019-05-29 LAB
BILIRUBIN, POC: NEGATIVE
BLOOD URINE, POC: ABNORMAL
CLARITY, POC: ABNORMAL
COLOR, POC: ABNORMAL
GLUCOSE URINE, POC: NEGATIVE
KETONES, POC: NEGATIVE
LEUKOCYTE EST, POC: ABNORMAL
NITRITE, POC: NEGATIVE
PH, POC: 5.5
PROTEIN, POC: NEGATIVE
SPECIFIC GRAVITY, POC: 1.01
UROBILINOGEN, POC: 0.2

## 2019-05-29 PROCEDURE — 81002 URINALYSIS NONAUTO W/O SCOPE: CPT | Performed by: FAMILY MEDICINE

## 2019-05-29 PROCEDURE — 1090F PRES/ABSN URINE INCON ASSESS: CPT | Performed by: FAMILY MEDICINE

## 2019-05-29 PROCEDURE — 87077 CULTURE AEROBIC IDENTIFY: CPT

## 2019-05-29 PROCEDURE — 87086 URINE CULTURE/COLONY COUNT: CPT

## 2019-05-29 PROCEDURE — G8510 SCR DEP NEG, NO PLAN REQD: HCPCS | Performed by: FAMILY MEDICINE

## 2019-05-29 PROCEDURE — 1123F ACP DISCUSS/DSCN MKR DOCD: CPT | Performed by: FAMILY MEDICINE

## 2019-05-29 PROCEDURE — 3288F FALL RISK ASSESSMENT DOCD: CPT | Performed by: FAMILY MEDICINE

## 2019-05-29 PROCEDURE — 4040F PNEUMOC VAC/ADMIN/RCVD: CPT | Performed by: FAMILY MEDICINE

## 2019-05-29 PROCEDURE — G8417 CALC BMI ABV UP PARAM F/U: HCPCS | Performed by: FAMILY MEDICINE

## 2019-05-29 PROCEDURE — G8427 DOCREV CUR MEDS BY ELIG CLIN: HCPCS | Performed by: FAMILY MEDICINE

## 2019-05-29 PROCEDURE — 87186 SC STD MICRODIL/AGAR DIL: CPT

## 2019-05-29 PROCEDURE — 1036F TOBACCO NON-USER: CPT | Performed by: FAMILY MEDICINE

## 2019-05-29 PROCEDURE — 99214 OFFICE O/P EST MOD 30 MIN: CPT | Performed by: FAMILY MEDICINE

## 2019-05-29 RX ORDER — CIPROFLOXACIN 250 MG/1
250 TABLET, FILM COATED ORAL 2 TIMES DAILY
Qty: 10 TABLET | Refills: 0 | Status: SHIPPED | OUTPATIENT
Start: 2019-05-29 | End: 2019-06-03

## 2019-05-29 ASSESSMENT — ENCOUNTER SYMPTOMS
SHORTNESS OF BREATH: 0
BLOOD IN STOOL: 0
EYE REDNESS: 0
NAUSEA: 0
FACIAL SWELLING: 0
COUGH: 0
BACK PAIN: 1
ABDOMINAL PAIN: 0
BOWEL INCONTINENCE: 0
DIARRHEA: 0
EYE DISCHARGE: 0
CONSTIPATION: 0
VOMITING: 0

## 2019-05-29 ASSESSMENT — PATIENT HEALTH QUESTIONNAIRE - PHQ9
2. FEELING DOWN, DEPRESSED OR HOPELESS: 0
SUM OF ALL RESPONSES TO PHQ QUESTIONS 1-9: 0
SUM OF ALL RESPONSES TO PHQ QUESTIONS 1-9: 0
SUM OF ALL RESPONSES TO PHQ9 QUESTIONS 1 & 2: 0
1. LITTLE INTEREST OR PLEASURE IN DOING THINGS: 0

## 2019-05-29 NOTE — PATIENT INSTRUCTIONS
SURVEY:    You may be receiving a survey from [employer] regarding your visit today. Please complete the survey to enable us to provide the highest quality of care to you and your family. If you cannot score us a very good on any question, please call the office to discuss how we could of made your experience a very good one. Thank you.

## 2019-05-29 NOTE — PROGRESS NOTES
HPI Notes    Name: Stephania Bell  : 1924        Chief Complaint:     Chief Complaint   Patient presents with    Hypertension    Hyperlipidemia    Gastroesophageal Reflux    Arthritis     Of lower back on [address] [place], did wake up today with a back ache and states she hasnt had those in a while.  Swelling     Has been having leg swelling, also has been going to the bathrrom 2-3 times during the night, then is unable to get back to sleep       History of Present Illness:     Stephania Bell is a 80 y.o.  female who presents with Hypertension; Hyperlipidemia; Gastroesophageal Reflux; Arthritis (Of lower back on [place], did wake up today with a back ache and states she hasnt had those in a while.); and Swelling (Has been having leg swelling, also has been going to the bathrrom 2-3 times during the night, then is unable to get back to sleep)      Hypertension   This is a chronic problem. The current episode started more than 1 year ago. The problem is unchanged. The problem is controlled. Pertinent negatives include no chest pain, headaches, neck pain, palpitations, peripheral edema or shortness of breath. There are no associated agents to hypertension. Risk factors for coronary artery disease include dyslipidemia and post-menopausal state. The current treatment provides significant improvement. Hyperlipidemia   This is a chronic problem. The current episode started more than 1 year ago. The problem is controlled. Recent lipid tests were reviewed and are normal. She has no history of diabetes or hypothyroidism. Pertinent negatives include no chest pain or shortness of breath. Current antihyperlipidemic treatment includes diet change. The current treatment provides significant improvement of lipids. Risk factors for coronary artery disease include dyslipidemia and hypertension. Gastroesophageal Reflux   She reports no abdominal pain, no chest pain, no coughing or no nausea.  This is a chronic problem. The current episode started more than 1 year ago. The problem has been unchanged. Pertinent negatives include no fatigue, melena or weight loss. She has tried a PPI for the symptoms. The treatment provided significant relief. Back Pain   This is a chronic problem. The current episode started more than 1 year ago. The problem occurs daily (pt has had back pain off and on for years but had not had for awhile. But in past several weeks pt has had more low back pain and no recent injury. ). The problem has been gradually worsening since onset. The quality of the pain is described as aching. Pertinent negatives include no abdominal pain, bladder incontinence, bowel incontinence, chest pain, dysuria, fever, headaches, numbness, paresis, perianal numbness, tingling, weakness or weight loss. Treatments tried: pt takes Nparosyn daily. The treatment provided moderate relief. Lower leg Swelling - pt still having some swelling in ankle areas ever since she fell and broke her knee cap and has been in rehab. Pt is walking more with walker. Pt is also trying to keep her feet up while sitting. Swelling better than it was by still not gone. No leg pain or redness. Pt admits to not drinking a lot of water. Urinary frequency - pt complains of going to bathroom 2- 3 times at night. Pt states that actually she has done this for awhile but now having a little more back pain. Pt also has some difficulty falling sleep after she wakes up to go to the bathroom.   Past Medical History:     Past Medical History:   Diagnosis Date    GERD (gastroesophageal reflux disease)     Hyperlipidemia     Hypertension     Urinary incontinence       Reviewed all health maintenance requirements and ordered appropriate tests  Health Maintenance Due   Topic Date Due    Shingles Vaccine (1 of 2) 12/12/1974    Potassium monitoring  09/12/2017    Creatinine monitoring  09/12/2017       Past Surgical History:     Past Surgical History: Procedure Laterality Date    BUNIONECTOMY      CHOLECYSTECTOMY, LAPAROSCOPIC  12/26/12    TONSILLECTOMY          Medications:       Prior to Admission medications    Medication Sig Start Date End Date Taking? Authorizing Provider   omeprazole (PRILOSEC) 20 MG delayed release capsule Take 1 capsule by mouth daily 4/23/19  Yes Lucas Rodriguez MD   hydrochlorothiazide (MICROZIDE) 12.5 MG capsule Take 1 capsule by mouth daily 4/15/19  Yes Lucas Rodriguez MD   naproxen (NAPROSYN) 500 MG tablet Take 1 tablet by mouth 2 times daily (with meals) 4/15/19  Yes Lucas Rodriguez MD   CRANBERRY PO Take by mouth daily   Yes Historical Provider, MD   calcium carbonate 600 MG TABS tablet Take 1 tablet by mouth daily. Yes Historical Provider, MD        Allergies:       Patient has no known allergies. Social History:     Tobacco:    reports that she has never smoked. She has never used smokeless tobacco.  Alcohol:      reports that she does not drink alcohol. Drug Use:  reports that she does not use drugs. Family History:     Family History   Problem Relation Age of Onset    Cancer Mother         Leukemia    Diabetes Father        Review of Systems:       Review of Systems   Constitutional: Negative for chills, fatigue, fever, unexpected weight change and weight loss. HENT: Negative for congestion and facial swelling. Eyes: Negative for discharge, redness and visual disturbance. Respiratory: Negative for cough and shortness of breath. Cardiovascular: Positive for leg swelling. Negative for chest pain and palpitations. Gastrointestinal: Negative for abdominal pain, blood in stool, bowel incontinence, constipation, diarrhea, melena, nausea and vomiting. Genitourinary: Negative for bladder incontinence, dysuria and hematuria. Musculoskeletal: Positive for back pain. Negative for joint swelling and neck pain. Skin: Negative for rash.    Neurological: Negative for dizziness, tingling, tremors, weakness, light-headedness, numbness and headaches. Psychiatric/Behavioral: Positive for sleep disturbance. Negative for confusion. Physical Exam:     Physical Exam   Constitutional: She is oriented to person, place, and time. She appears well-developed and well-nourished. HENT:   Head: Normocephalic and atraumatic. Eyes: Pupils are equal, round, and reactive to light. Conjunctivae are normal. Right eye exhibits no discharge. Left eye exhibits no discharge. Neck: Neck supple. No thyromegaly present. Cardiovascular: Normal rate, regular rhythm and normal heart sounds. No murmur heard. Bilateral trace edema at ankles    Pulmonary/Chest: Effort normal and breath sounds normal.   Abdominal: Soft. Bowel sounds are normal. She exhibits no distension. There is no tenderness. Musculoskeletal: She exhibits tenderness. She exhibits no edema. Lumbar back: She exhibits tenderness. She exhibits normal range of motion, no bony tenderness, no swelling, no edema and no deformity. Tender mildly across muscle no lumbar tenderness. Lymphadenopathy:     She has no cervical adenopathy. Neurological: She is alert and oriented to person, place, and time. Skin: Skin is dry. No rash noted. No erythema. Psychiatric: She has a normal mood and affect. Her behavior is normal.   Vitals reviewed.       Vitals:  /60 (Site: Left Upper Arm, Position: Sitting, Cuff Size: Medium Adult)   Pulse 64   Ht 5' 7\" (1.702 m)   Wt 169 lb (76.7 kg)   LMP  (LMP Unknown)   SpO2 93%   BMI 26.47 kg/m²       Data:     Lab Results   Component Value Date     09/12/2016    K 3.7 09/12/2016     09/12/2016    CO2 28 09/12/2016    BUN 22 09/12/2016    CREATININE 0.82 09/12/2016    GLUCOSE 104 09/12/2016    GLUCOSE 103 06/06/2012    PROT 6.7 09/12/2016    LABALBU 3.7 09/12/2016    LABALBU 4.1 06/06/2012    BILITOT 0.62 09/12/2016    ALKPHOS 96 09/12/2016    AST 20 09/12/2016    ALT 14 09/12/2016     Lab Results 7/18/2017 9/29/2015 8/26/2014   2 or more falls in past year? yes no no no no   Fall with injury in past year? yes no no no no     The patient does not have a history of falls. I did not - not indicated , complete a risk assessment for falls. A plan of care for falls No Treatment plan indicated    Lab Results   Component Value Date    LDLCHOLESTEROL 138 (H) 09/12/2016    (goal LDL reduction with dx if diabetes is 50% LDL reduction)    PHQ Scores 5/29/2019 5/24/2018 7/18/2017 6/27/2016 2/25/2015   PHQ2 Score 0 0 0 0 0   PHQ9 Score 0 0 0 0 0     Interpretation of Total Score Depression Severity: 1-4 = Minimal depression, 5-9 = Mild depression, 10-14 = Moderate depression, 15-19 = Moderately severe depression, 20-27 = Severe depression        Return in about 6 months (around 11/29/2019).       Electronically signed by Andrea Tate MD on 5/29/2019 at 9:45 AM

## 2019-05-31 LAB
CULTURE: ABNORMAL
Lab: ABNORMAL
SPECIMEN DESCRIPTION: ABNORMAL

## 2019-08-28 ENCOUNTER — TELEPHONE (OUTPATIENT)
Dept: FAMILY MEDICINE CLINIC | Age: 84
End: 2019-08-28

## 2019-09-05 ENCOUNTER — OFFICE VISIT (OUTPATIENT)
Dept: FAMILY MEDICINE CLINIC | Age: 84
End: 2019-09-05
Payer: MEDICARE

## 2019-09-05 VITALS
BODY MASS INDEX: 28.98 KG/M2 | DIASTOLIC BLOOD PRESSURE: 64 MMHG | SYSTOLIC BLOOD PRESSURE: 110 MMHG | HEART RATE: 72 BPM | OXYGEN SATURATION: 92 % | WEIGHT: 185 LBS

## 2019-09-05 DIAGNOSIS — M79.89 LEG SWELLING: ICD-10-CM

## 2019-09-05 DIAGNOSIS — G25.81 RESTLESS LEG: Primary | ICD-10-CM

## 2019-09-05 PROCEDURE — 1123F ACP DISCUSS/DSCN MKR DOCD: CPT | Performed by: FAMILY MEDICINE

## 2019-09-05 PROCEDURE — 1036F TOBACCO NON-USER: CPT | Performed by: FAMILY MEDICINE

## 2019-09-05 PROCEDURE — 1090F PRES/ABSN URINE INCON ASSESS: CPT | Performed by: FAMILY MEDICINE

## 2019-09-05 PROCEDURE — G8417 CALC BMI ABV UP PARAM F/U: HCPCS | Performed by: FAMILY MEDICINE

## 2019-09-05 PROCEDURE — 99213 OFFICE O/P EST LOW 20 MIN: CPT | Performed by: FAMILY MEDICINE

## 2019-09-05 PROCEDURE — 4040F PNEUMOC VAC/ADMIN/RCVD: CPT | Performed by: FAMILY MEDICINE

## 2019-09-05 PROCEDURE — G8427 DOCREV CUR MEDS BY ELIG CLIN: HCPCS | Performed by: FAMILY MEDICINE

## 2019-09-05 RX ORDER — TRAMADOL HYDROCHLORIDE 50 MG/1
50 TABLET ORAL EVERY 6 HOURS PRN
COMMUNITY
End: 2020-09-26

## 2019-09-05 RX ORDER — TRAMADOL HYDROCHLORIDE 50 MG/1
50 TABLET ORAL EVERY 6 HOURS PRN
Status: CANCELLED | OUTPATIENT
Start: 2019-09-05 | End: 2019-10-05

## 2019-09-05 ASSESSMENT — ENCOUNTER SYMPTOMS
BLOOD IN STOOL: 0
DIARRHEA: 0
SHORTNESS OF BREATH: 0
EYE REDNESS: 0
COUGH: 0
VOMITING: 0
EYE DISCHARGE: 0

## 2019-09-05 NOTE — PROGRESS NOTES
Potassium monitoring  09/12/2017    Creatinine monitoring  09/12/2017    Flu vaccine (1) 09/01/2019       Past Surgical History:     Past Surgical History:   Procedure Laterality Date    BUNIONECTOMY      CHOLECYSTECTOMY, LAPAROSCOPIC  12/26/12    TONSILLECTOMY          Medications:       Prior to Admission medications    Medication Sig Start Date End Date Taking? Authorizing Provider   traMADol (ULTRAM) 50 MG tablet Take 50 mg by mouth every 6 hours as needed for Pain. Yes Historical Provider, MD   omeprazole (PRILOSEC) 20 MG delayed release capsule Take 1 capsule by mouth daily 4/23/19  Yes Kristi Dye MD   hydrochlorothiazide (MICROZIDE) 12.5 MG capsule Take 1 capsule by mouth daily 4/15/19  Yes Kristi Dye MD   naproxen (NAPROSYN) 500 MG tablet Take 1 tablet by mouth 2 times daily (with meals) 4/15/19  Yes Kristi Dye MD   CRANBERRY PO Take by mouth daily   Yes Historical Provider, MD   calcium carbonate 600 MG TABS tablet Take 1 tablet by mouth daily. Yes Historical Provider, MD        Allergies:       Patient has no known allergies. Social History:     Tobacco:    reports that she has never smoked. She has never used smokeless tobacco.  Alcohol:      reports that she does not drink alcohol. Drug Use:  reports that she does not use drugs. Family History:     Family History   Problem Relation Age of Onset   [facility] Cancer Mother         Leukemia    Diabetes Father        Review of Systems:       Review of Systems   Constitutional: Negative for chills and fever. Eyes: Negative for discharge and redness. Respiratory: Negative for cough and shortness of breath. Cardiovascular: Positive for leg swelling. Negative for chest pain and palpitations. Gastrointestinal: Negative for blood in stool, diarrhea and vomiting. Genitourinary: Negative for difficulty urinating. Musculoskeletal: Negative for joint swelling. Lt leg restlessness but no pain.    Skin: Negative for pallor and rash. Physical Exam:     Physical Exam   Constitutional: She appears well-developed and well-nourished. HENT:   Head: Normocephalic and atraumatic. Eyes: Right eye exhibits no discharge. Left eye exhibits no discharge. Cardiovascular: Normal rate, regular rhythm and normal heart sounds. No murmur heard. Pulses:       Dorsalis pedis pulses are 2+ on the right side, and 2+ on the left side. Bilateral LE trace edema from knee to ankle. No teddy's and no calf pain. No erythema    Pulmonary/Chest: Effort normal. No respiratory distress. She has no rales. Vitals reviewed. Vitals:  /64   Pulse 72   Wt 185 lb (83.9 kg)   LMP  (LMP Unknown)   SpO2 92%   BMI 28.98 kg/m²       Data:     Lab Results   Component Value Date     09/12/2016    K 3.7 09/12/2016     09/12/2016    CO2 28 09/12/2016    BUN 22 09/12/2016    CREATININE 0.82 09/12/2016    GLUCOSE 104 09/12/2016    GLUCOSE 103 06/06/2012    PROT 6.7 09/12/2016    LABALBU 3.7 09/12/2016    LABALBU 4.1 06/06/2012    BILITOT 0.62 09/12/2016    ALKPHOS 96 09/12/2016    AST 20 09/12/2016    ALT 14 09/12/2016     Lab Results   Component Value Date    WBC 5.9 08/04/2016    RBC 4.44 08/04/2016    RBC 4.73 12/14/2011    HGB 13.6 08/04/2016    HCT 40.7 08/04/2016    MCV 91.7 08/04/2016    MCH 30.6 08/04/2016    MCHC 33.4 08/04/2016    RDW 15.1 08/04/2016     08/04/2016     12/14/2011    MPV NOT REPORTED 08/04/2016     Lab Results   Component Value Date    TSH 2.36 10/25/2012     Lab Results   Component Value Date    CHOL 216 09/12/2016    HDL 56 09/12/2016          Assessment/Plan:        1. Restless leg  Pt is going to exercise on stationary bike 1-2 times per day again and if this continues to help the Lt restless leg then great. But if restlessness continues then pt will call (have nurse at the [facility] call) before her Nov appt to try a medication like mirapex. Stop taking any ultram even if PRN.  Only

## 2019-10-29 RX ORDER — NAPROXEN 500 MG/1
TABLET ORAL
Qty: 180 TABLET | Refills: 1 | Status: SHIPPED | OUTPATIENT
Start: 2019-10-29 | End: 2020-04-26

## 2019-10-29 RX ORDER — OMEPRAZOLE 20 MG/1
CAPSULE, DELAYED RELEASE ORAL
Qty: 90 CAPSULE | Refills: 1 | Status: SHIPPED | OUTPATIENT
Start: 2019-10-29 | End: 2020-04-26

## 2019-10-29 RX ORDER — HYDROCHLOROTHIAZIDE 12.5 MG/1
CAPSULE, GELATIN COATED ORAL
Qty: 90 CAPSULE | Refills: 1 | Status: SHIPPED | OUTPATIENT
Start: 2019-10-29 | End: 2020-04-26

## 2019-11-27 ENCOUNTER — OFFICE VISIT (OUTPATIENT)
Dept: FAMILY MEDICINE CLINIC | Age: 84
End: 2019-11-27
Payer: MEDICARE

## 2019-11-27 ENCOUNTER — HOSPITAL ENCOUNTER (OUTPATIENT)
Age: 84
Setting detail: SPECIMEN
Discharge: HOME | End: 2019-11-27
Payer: MEDICARE

## 2019-11-27 VITALS
SYSTOLIC BLOOD PRESSURE: 132 MMHG | OXYGEN SATURATION: 93 % | DIASTOLIC BLOOD PRESSURE: 82 MMHG | HEART RATE: 60 BPM | WEIGHT: 184 LBS | BODY MASS INDEX: 28.82 KG/M2

## 2019-11-27 DIAGNOSIS — I10 ESSENTIAL HYPERTENSION, BENIGN: ICD-10-CM

## 2019-11-27 DIAGNOSIS — K21.9 GASTROESOPHAGEAL REFLUX DISEASE WITHOUT ESOPHAGITIS: ICD-10-CM

## 2019-11-27 DIAGNOSIS — R53.83 OTHER FATIGUE: ICD-10-CM

## 2019-11-27 DIAGNOSIS — R19.6 BAD BREATH: ICD-10-CM

## 2019-11-27 DIAGNOSIS — R22.43 LOCALIZED SWELLING OF BOTH LOWER LEGS: ICD-10-CM

## 2019-11-27 DIAGNOSIS — R35.0 URINARY FREQUENCY: ICD-10-CM

## 2019-11-27 DIAGNOSIS — L57.0 ACTINIC KERATOSIS: ICD-10-CM

## 2019-11-27 DIAGNOSIS — G25.81 RESTLESS LEG: ICD-10-CM

## 2019-11-27 DIAGNOSIS — R35.0 URINARY FREQUENCY: Primary | ICD-10-CM

## 2019-11-27 LAB
BILIRUBIN, POC: NEGATIVE
BLOOD URINE, POC: NEGATIVE
CLARITY, POC: ABNORMAL
COLOR, POC: ABNORMAL
GLUCOSE URINE, POC: NEGATIVE
KETONES, POC: ABNORMAL
LEUKOCYTE EST, POC: ABNORMAL
NITRITE, POC: POSITIVE
PH, POC: 5.5
PROTEIN, POC: NEGATIVE
SPECIFIC GRAVITY, POC: 1.02
UROBILINOGEN, POC: 0.2

## 2019-11-27 PROCEDURE — 99214 OFFICE O/P EST MOD 30 MIN: CPT | Performed by: FAMILY MEDICINE

## 2019-11-27 PROCEDURE — 1123F ACP DISCUSS/DSCN MKR DOCD: CPT | Performed by: FAMILY MEDICINE

## 2019-11-27 PROCEDURE — 1036F TOBACCO NON-USER: CPT | Performed by: FAMILY MEDICINE

## 2019-11-27 PROCEDURE — 4040F PNEUMOC VAC/ADMIN/RCVD: CPT | Performed by: FAMILY MEDICINE

## 2019-11-27 PROCEDURE — 81002 URINALYSIS NONAUTO W/O SCOPE: CPT | Performed by: FAMILY MEDICINE

## 2019-11-27 PROCEDURE — 1090F PRES/ABSN URINE INCON ASSESS: CPT | Performed by: FAMILY MEDICINE

## 2019-11-27 PROCEDURE — 17000 DESTRUCT PREMALG LESION: CPT | Performed by: FAMILY MEDICINE

## 2019-11-27 PROCEDURE — 87077 CULTURE AEROBIC IDENTIFY: CPT

## 2019-11-27 PROCEDURE — 87186 SC STD MICRODIL/AGAR DIL: CPT

## 2019-11-27 PROCEDURE — 87086 URINE CULTURE/COLONY COUNT: CPT

## 2019-11-27 PROCEDURE — G8484 FLU IMMUNIZE NO ADMIN: HCPCS | Performed by: FAMILY MEDICINE

## 2019-11-27 PROCEDURE — G8417 CALC BMI ABV UP PARAM F/U: HCPCS | Performed by: FAMILY MEDICINE

## 2019-11-27 PROCEDURE — G8427 DOCREV CUR MEDS BY ELIG CLIN: HCPCS | Performed by: FAMILY MEDICINE

## 2019-11-27 RX ORDER — CIPROFLOXACIN 250 MG/1
250 TABLET, FILM COATED ORAL 2 TIMES DAILY
Qty: 14 TABLET | Refills: 0 | Status: SHIPPED | OUTPATIENT
Start: 2019-11-27 | End: 2019-12-04

## 2019-11-27 RX ORDER — ACETAMINOPHEN 325 MG/1
650 TABLET ORAL EVERY 6 HOURS PRN
COMMUNITY

## 2019-11-27 ASSESSMENT — ENCOUNTER SYMPTOMS
NAUSEA: 0
VOMITING: 0

## 2019-11-29 LAB
CULTURE: ABNORMAL
Lab: ABNORMAL
SPECIMEN DESCRIPTION: ABNORMAL

## 2019-11-30 ASSESSMENT — ENCOUNTER SYMPTOMS
CHANGE IN BOWEL HABIT: 0
EYE REDNESS: 0
ABDOMINAL PAIN: 0
COUGH: 0
EYE DISCHARGE: 0
FACIAL SWELLING: 0
WHEEZING: 0
SORE THROAT: 0

## 2020-04-26 RX ORDER — HYDROCHLOROTHIAZIDE 12.5 MG/1
CAPSULE, GELATIN COATED ORAL
Qty: 90 CAPSULE | Refills: 1 | Status: SHIPPED | OUTPATIENT
Start: 2020-04-26 | End: 2020-11-13 | Stop reason: SDUPTHER

## 2020-04-26 RX ORDER — OMEPRAZOLE 20 MG/1
CAPSULE, DELAYED RELEASE ORAL
Qty: 90 CAPSULE | Refills: 1 | Status: SHIPPED | OUTPATIENT
Start: 2020-04-26 | End: 2020-11-13 | Stop reason: SDUPTHER

## 2020-04-26 RX ORDER — NAPROXEN 500 MG/1
TABLET ORAL
Qty: 180 TABLET | Refills: 1 | Status: SHIPPED | OUTPATIENT
Start: 2020-04-26 | End: 2022-02-02

## 2020-07-31 NOTE — PRE-CERTIFICATION NOTE
Medicare Cap     [] Physical Therapy  [] Speech Therapy  [] Occupational therapy    *PT and Speech caps combine      $4031 Cap limit < kx modifier needed < $5673 requires pre-cert     Patient Name: Clovis Austin: 12/12/1924     Date of [address] Name $$$ charge Daily Charge YTD   Total $   11/2/17 Previous balance 935.13  935.13   11/2/17 Eval, therex 78.94, 31.69 110.63 1045.76   11/07/17 Ex x 2, man 31.69 x 2, 29.30 92.68 1138.44   11/9/17 Ex x2, man 31.69 x2, 29.30 92.68 1231.12   11/13/17 Ex x2, man 31.69 X 2, 29.30 92.68 1323.80   11/16/17 Ex x2, man 31.69 X 2, 29.30 92.68 1416.48   11/20/17 Ex x2, man 31.69 X 2, 29.30 92.68 1509.16   11/22/17 Ex x2, man 31.69 X 2, 29.30 92.68 1601.84 [facility] Hospital Medicine  Consult Note    Patient Name: Elmer Borrero  MRN: [id number]  Admission Date: 7/28/2020  Hospital Length of Stay: 3 days  Attending Physician: Abraham Dang MD   Primary Care Provider: Dae Romo MD     [facility] Medicine Team: Networked reference to record PCT  Kyle Padgett MD      Patient information was obtained from patient, spouse/SO, past medical records and ER records.     Consults  Subjective:     Principal Problem: Cervical stenosis of spine    Chief Complaint: No chief complaint on file.       HPI: 65 y.o. M w/ pmh of CAD, HTN, HLD, and OSAS on CPAP, 3 days out of a laminectomy and a posterior cervical fusion of C3-C6.     Pt presented w/ occasional left arm pain. The pain has been present since discharge from the hospital 11/19. Pt described  pain as aching and intermittent.  Pain started in the low back.  He started applying heat and the pain improved. Neck pain stared after. Back pain is intermittently present. Pain is worse with activity and improved by heat. There is no associated numbness and tingling. There is subjective weakness. Pt reports decreased  strength and difficulty with buttons. Prior treatments have included tylenol, but no PT, ESIs or recent surgery. He did have lumbar ESIs prior to his surgery in 2015.  Pt was seen by ortho would recommended the procedure. Pt has been doing well since surgery but he complains of severe pain in his left shoulder and his blood pressure has been higher than his baseline.     Pt had a left heart catheterization for worsening angina in 11/19 and an RCA stent was placed. Pt is s/p knee replacement 9/2019.Pt mentions a history of unknown lumbar spine surgery in 2015 in [place].    Medicine is consulted for assistance with HTN management.     Past Medical History:   Diagnosis Date    Actinic keratosis     Arthritis     BPH (benign prostatic hyperplasia)     Cataract     Coronary artery  disease     Fatty liver     Heart attack     Hyperlipidemia     Hypertension     Kidney stone     Morbid obesity with BMI of 40.0-44.9, adult 10/2/2019    Obesity (BMI 30-39.9) 10/2/2019    GEOVANNA on CPAP     Renal calculi     Subclinical hypothyroidism        Past Surgical History:   Procedure Laterality Date    JOINT REPLACEMENT      left knee    KNEE SURGERY      LEFT HEART CATHETERIZATION Left 11/27/2019    Procedure: Left heart cath;  Surgeon: Dejan Nielsen MD;  Location: [facility] CATH;  Service: Cardiovascular;  Laterality: Left;    PERCUTANEOUS CRYOTHERAPY OF PERIPHERAL NERVE USING LIQUID NITROUS OXIDE IN CLOSED NEEDLE DEVICE Right 8/26/2019    Procedure: CRYOTHERAPY, NERVE, PERIPHERAL, PERCUTANEOUS, USING LIQUID NITROUS OXIDE IN CLOSED NEEDLE DEVICE;  Surgeon: CHYNA Quevedo;  Location: [facility] CATH LAB;  Service: Pain Management;  Laterality: Right;  Right Knee IOVERA    POSTERIOR FUSION OF CERVICAL SPINE WITH LAMINECTOMY N/A 7/28/2020    Procedure: LAMINECTOMY, SPINE, CERVICAL, WITH POSTERIOR FUSION C3-6 Depuy SNS:Motors/SSEP/EMG Old Jer + Pads + tongs ;  Surgeon: Abraham Dang MD;  Location: [facility] OR [address] [place] [address];  Service: Orthopedics;  Laterality: N/A;    right knee replacement       SPINE SURGERY      around 2010     TOTAL KNEE ARTHROPLASTY Right 9/5/2019    Procedure: ARTHROPLASTY, KNEE, TOTAL-VINCENZO-SAME DAY;  Surgeon: Arturo Schultz MD;  Location: [facility] OR [address] [place] [address];  Service: Orthopedics;  Laterality: Right;       Review of patient's allergies indicates:   Allergen Reactions    Ace inhibitors Other (See Comments)     cough       No current facility-administered medications on file prior to encounter.      Current Outpatient Medications on File Prior to Encounter   Medication Sig    allopurinol (ZYLOPRIM) 300 MG tablet Take 1 tablet (300 mg total) by mouth once daily. (Patient taking differently: Take 300 mg by mouth once daily. Take if needed)    finasteride (PROSCAR)  5 mg tablet TAKE 1 TABLET BY MOUTH EVERY DAY (Patient taking differently: Take in am of surgery)    loratadine (CLARITIN) 10 mg tablet Take 1 tablet by mouth daily as needed. Take if needed    metoprolol succinate (TOPROL-XL) 25 MG 24 hr tablet Take 0.5 tablets (12.5 mg total) by mouth once daily. (Patient taking differently: Take 12.5 mg by mouth once daily. Take in am of surgery)    montelukast (SINGULAIR) 10 mg tablet TAKE 1 TABLET EVERY EVENING (Patient taking differently: Takes at night)    multivitamin capsule Take 1 capsule by mouth once daily. Hold for 1 week prior to surgery    rosuvastatin (CRESTOR) 20 MG tablet Take 1 tablet (20 mg total) by mouth every evening. (Patient taking differently: Take 20 mg by mouth every evening. Takes at night)    tamsulosin (FLOMAX) 0.4 mg Cap Take 1 capsule (0.4 mg total) by mouth once daily. (Patient taking differently: Take 1 capsule by mouth once daily. Take in am of surgery)    telmisartan-hydrochlorothiazide (MICARDIS HCT) 80-25 mg per tablet TAKE 1 TABLET BY MOUTH EVERY DAY (Patient taking differently: Hold am of surgery)    ticagrelor (BRILINTA) 90 mg tablet Take 1 tablet (90 mg total) by mouth 2 (two) times daily. (Patient taking differently: Take 90 mg by mouth 2 (two) times daily. Stopped per Dr Nielsen, last dose was 7/18/20.)    cyclobenzaprine (FLEXERIL) 5 MG tablet Take 1 tablet by mouth nightly. Take if needed     Family History     Problem Relation (Age of Onset)    Cancer Paternal Grandfather    Heart disease Father, Brother    Hyperlipidemia Mother    Hypertension Father, Mother, Sister        Tobacco Use    Smoking status: Never Smoker    Smokeless tobacco: Never Used   Substance and Sexual Activity    Alcohol use: Yes     Alcohol/week: 14.0 standard drinks     Types: 14 Cans of beer per week     Frequency: 4 or more times a week     Drinks per session: 3 or 4     Binge frequency: Weekly    Drug use: No    Sexual activity: Yes     Partners:  Female     Review of Systems   Constitutional: Negative for activity change, appetite change, chills, diaphoresis, fatigue and fever.   Respiratory: Negative for apnea, cough, choking, chest tightness, shortness of breath, wheezing and stridor.    Cardiovascular: Negative for chest pain, palpitations and leg swelling.   Gastrointestinal: Positive for constipation. Negative for abdominal distention, abdominal pain, anal bleeding, blood in stool, diarrhea, nausea and vomiting.   Genitourinary: Negative.    Musculoskeletal: Positive for arthralgias (L shoulder), neck pain and neck stiffness.   Skin: Negative.    Neurological: Positive for dizziness (mild dizziness withe hypertensive episodes ). Negative for tremors, seizures, syncope, facial asymmetry, speech difficulty, weakness, light-headedness, numbness and headaches.   Psychiatric/Behavioral: Negative.      Objective:     Vital Signs (Most Recent):  Temp: 97 °F (36.1 °C) (07/31/20 0834)  Pulse: 76 (07/31/20 0834)  Resp: 18 (07/31/20 0645)  BP: (!) 187/88 (07/31/20 0834)  SpO2: 98 % (07/31/20 0834) Vital Signs (24h Range):  Temp:  [97 °F (36.1 °C)-99.3 °F (37.4 °C)] 97 °F (36.1 °C)  Pulse:  [76-87] 76  Resp:  [14-20] 18  SpO2:  [95 %-98 %] 98 %  BP: (162-187)/(81-93) 187/88     Weight: 126.6 kg (279 lb 1.6 oz)  Body mass index is 43.71 kg/m².    Physical Exam  Vitals signs reviewed.   Constitutional:       General: He is not in acute distress.     Appearance: Normal appearance. He is obese. He is not ill-appearing, toxic-appearing or diaphoretic.   HENT:      Head: Normocephalic and atraumatic.   Cardiovascular:      Rate and Rhythm: Normal rate and regular rhythm.      Pulses: Normal pulses.      Heart sounds: Normal heart sounds. No murmur. No friction rub. No gallop.    Pulmonary:      Effort: Pulmonary effort is normal. No respiratory distress.      Breath sounds: Normal breath sounds. No stridor. No wheezing, rhonchi or rales.   Chest:      Chest wall: No  tenderness.   Abdominal:      General: Abdomen is flat. Bowel sounds are normal. There is distension.      Palpations: Abdomen is soft.      Tenderness: There is no abdominal tenderness. There is no guarding or rebound.   Musculoskeletal:         General: Tenderness (L neck and shoulder) present. No swelling.      Right lower leg: No edema.      Left lower leg: No edema.   Skin:     General: Skin is warm and dry.      Coloration: Skin is not jaundiced or pale.      Findings: No bruising, erythema, lesion or rash.   Neurological:      General: No focal deficit present.      Mental Status: He is alert and oriented to person, place, and time. Mental status is at baseline.   Psychiatric:         Mood and Affect: Mood normal.         Behavior: Behavior normal.         Thought Content: Thought content normal.         Judgment: Judgment normal.         Significant Labs:   CBC:   Recent Labs   Lab 07/30/20  0518   WBC 10.06   HGB 12.2*   HCT 37.7*        CMP:   Recent Labs   Lab 07/30/20  0518   *   K 3.6   CL 97   CO2 28      BUN 12   CREATININE 0.8   CALCIUM 9.1   ANIONGAP 9   EGFRNONAA >60.0       Significant Imaging: I have reviewed all pertinent imaging results/findings within the past 24 hours.    Assessment/Plan:     Hypertension  Pt is severe pain that unmanaged with current pain control agents. BP at home in 140s-150s. Home Rx: Temisartan 80 mg + HCTZ 25 mg and Metoprolol XL 12.5 mg.     PLAN:  -- Discontinue Metoprolol XL and start Carvedilol 6.25 mg daily  -- Optimize pain management agents and consider scheduled agents as well as PRN. Would recommend increasing tylenol dose to 1000mg Q6h, Scheduling the methocarbamol 750 mg Q8H, and adding a lidocain patch to the pt's regimen   -- Consider scheduling long acting opiates if pain is still not controlled  -- Schedule Senna to help with constipation and a trial of suppository tonight        VTE Risk Mitigation (From admission, onward)          Ordered     IP VTE HIGH RISK PATIENT  Once      07/28/20 1548     Place sequential compression device  Until discontinued      07/28/20 1548                    Thank you for your consult. I will follow-up with patient. Please contact us if you have any additional questions.    Kyle Padgett MD  Department of Hospital Medicine   [facility]

## 2020-09-26 ENCOUNTER — HOSPITAL ENCOUNTER (EMERGENCY)
Age: 85
Discharge: ANOTHER HOSPITAL | End: 2020-09-26
Attending: FAMILY MEDICINE
Payer: MEDICARE

## 2020-09-26 ENCOUNTER — APPOINTMENT (OUTPATIENT)
Dept: GENERAL RADIOLOGY | Age: 85
End: 2020-09-26
Payer: MEDICARE

## 2020-09-26 VITALS
OXYGEN SATURATION: 90 % | WEIGHT: 183 LBS | TEMPERATURE: 99.1 F | RESPIRATION RATE: 20 BRPM | SYSTOLIC BLOOD PRESSURE: 154 MMHG | BODY MASS INDEX: 28.66 KG/M2 | DIASTOLIC BLOOD PRESSURE: 76 MMHG | HEART RATE: 80 BPM

## 2020-09-26 LAB
ABSOLUTE EOS #: 0 K/UL (ref 0–0.4)
ABSOLUTE IMMATURE GRANULOCYTE: ABNORMAL K/UL (ref 0–0.3)
ABSOLUTE LYMPH #: 1.6 K/UL (ref 1–4.8)
ABSOLUTE MONO #: 0.7 K/UL (ref 0–1)
ALBUMIN SERPL-MCNC: 3.9 G/DL (ref 3.5–5.2)
ALBUMIN/GLOBULIN RATIO: ABNORMAL (ref 1–2.5)
ALP BLD-CCNC: 132 U/L (ref 35–104)
ALT SERPL-CCNC: 17 U/L (ref 5–33)
ANION GAP SERPL CALCULATED.3IONS-SCNC: 10 MMOL/L (ref 9–17)
AST SERPL-CCNC: 31 U/L
BASOPHILS # BLD: 0 % (ref 0–2)
BASOPHILS ABSOLUTE: 0 K/UL (ref 0–0.2)
BILIRUB SERPL-MCNC: 0.16 MG/DL (ref 0.3–1.2)
BILIRUBIN URINE: NEGATIVE
BUN BLDV-MCNC: 27 MG/DL (ref 8–23)
BUN/CREAT BLD: 21 (ref 9–20)
CALCIUM SERPL-MCNC: 9.5 MG/DL (ref 8.6–10.4)
CHLORIDE BLD-SCNC: 102 MMOL/L (ref 98–107)
CO2: 25 MMOL/L (ref 20–31)
COLOR: ABNORMAL
COMMENT UA: ABNORMAL
CREAT SERPL-MCNC: 1.3 MG/DL (ref 0.5–0.9)
DIFFERENTIAL TYPE: YES
DIRECT EXAM: NORMAL
EOSINOPHILS RELATIVE PERCENT: 0 % (ref 0–5)
GFR AFRICAN AMERICAN: 46 ML/MIN
GFR NON-AFRICAN AMERICAN: 38 ML/MIN
GFR SERPL CREATININE-BSD FRML MDRD: ABNORMAL ML/MIN/{1.73_M2}
GFR SERPL CREATININE-BSD FRML MDRD: ABNORMAL ML/MIN/{1.73_M2}
GLUCOSE BLD-MCNC: 99 MG/DL (ref 70–99)
GLUCOSE URINE: NEGATIVE
HCT VFR BLD CALC: 40.2 % (ref 36–46)
HEMOGLOBIN: 13.4 G/DL (ref 12–16)
IMMATURE GRANULOCYTES: ABNORMAL %
KETONES, URINE: NEGATIVE
LEUKOCYTE ESTERASE, URINE: NEGATIVE
LYMPHOCYTES # BLD: 23 % (ref 15–40)
Lab: NORMAL
MCH RBC QN AUTO: 31.1 PG (ref 26–34)
MCHC RBC AUTO-ENTMCNC: 33.4 G/DL (ref 31–37)
MCV RBC AUTO: 93.3 FL (ref 80–100)
MONOCYTES # BLD: 10 % (ref 4–8)
NITRITE, URINE: NEGATIVE
NRBC AUTOMATED: ABNORMAL PER 100 WBC
PDW BLD-RTO: 16 % (ref 12.1–15.2)
PH UA: 6 (ref 5–8)
PLATELET # BLD: 147 K/UL (ref 140–450)
PLATELET ESTIMATE: ABNORMAL
PMV BLD AUTO: ABNORMAL FL (ref 6–12)
POTASSIUM SERPL-SCNC: 3.6 MMOL/L (ref 3.7–5.3)
PROTEIN UA: NEGATIVE
RBC # BLD: 4.3 M/UL (ref 4–5.2)
RBC # BLD: ABNORMAL 10*6/UL
SARS-COV-2, RAPID: DETECTED
SARS-COV-2: ABNORMAL
SARS-COV-2: ABNORMAL
SEG NEUTROPHILS: 67 % (ref 47–75)
SEGMENTED NEUTROPHILS ABSOLUTE COUNT: 4.6 K/UL (ref 2.5–7)
SODIUM BLD-SCNC: 137 MMOL/L (ref 135–144)
SOURCE: ABNORMAL
SPECIFIC GRAVITY UA: 1 (ref 1–1.03)
SPECIMEN DESCRIPTION: NORMAL
TOTAL PROTEIN: 6.6 G/DL (ref 6.4–8.3)
TURBIDITY: CLEAR
URINE HGB: NEGATIVE
UROBILINOGEN, URINE: NORMAL
WBC # BLD: 6.9 K/UL (ref 3.5–11)
WBC # BLD: ABNORMAL 10*3/UL

## 2020-09-26 PROCEDURE — 96365 THER/PROPH/DIAG IV INF INIT: CPT

## 2020-09-26 PROCEDURE — U0002 COVID-19 LAB TEST NON-CDC: HCPCS

## 2020-09-26 PROCEDURE — 87880 STREP A ASSAY W/OPTIC: CPT

## 2020-09-26 PROCEDURE — 99284 EMERGENCY DEPT VISIT MOD MDM: CPT

## 2020-09-26 PROCEDURE — 87040 BLOOD CULTURE FOR BACTERIA: CPT

## 2020-09-26 PROCEDURE — 71045 X-RAY EXAM CHEST 1 VIEW: CPT

## 2020-09-26 PROCEDURE — 81003 URINALYSIS AUTO W/O SCOPE: CPT

## 2020-09-26 PROCEDURE — 6360000002 HC RX W HCPCS: Performed by: FAMILY MEDICINE

## 2020-09-26 PROCEDURE — 36415 COLL VENOUS BLD VENIPUNCTURE: CPT

## 2020-09-26 PROCEDURE — 80053 COMPREHEN METABOLIC PANEL: CPT

## 2020-09-26 PROCEDURE — 85025 COMPLETE CBC W/AUTO DIFF WBC: CPT

## 2020-09-26 PROCEDURE — 99285 EMERGENCY DEPT VISIT HI MDM: CPT

## 2020-09-26 PROCEDURE — 2580000003 HC RX 258: Performed by: FAMILY MEDICINE

## 2020-09-26 RX ORDER — DOCUSATE SODIUM 100 MG/1
100 CAPSULE, LIQUID FILLED ORAL 2 TIMES DAILY PRN
COMMUNITY

## 2020-09-26 RX ADMIN — PIPERACILLIN AND TAZOBACTAM 3.38 G: 3; .375 INJECTION, POWDER, FOR SOLUTION INTRAVENOUS at 18:34

## 2020-09-26 ASSESSMENT — PAIN DESCRIPTION - PAIN TYPE: TYPE: ACUTE PAIN

## 2020-09-26 ASSESSMENT — PAIN DESCRIPTION - LOCATION: LOCATION: THROAT

## 2020-09-26 ASSESSMENT — PAIN SCALES - GENERAL: PAINLEVEL_OUTOF10: 5

## 2020-09-26 ASSESSMENT — PAIN DESCRIPTION - FREQUENCY: FREQUENCY: INTERMITTENT

## 2020-09-26 ASSESSMENT — PAIN DESCRIPTION - DESCRIPTORS: DESCRIPTORS: ACHING;BURNING

## 2020-09-26 NOTE — ED NOTES
Advance Care Planning     Advance Care Planning Activator (Inpatient)  Conversation Note      Date of ACP Conversation: 9/26/2020    Conversation Conducted with: Patient with Decision Making Capacity    ACP Activator: [address] makes decisions on behalf of the incapacitated patient: Decision Maker is asked to consider and make decisions based on patient values, known preferences, or best interests. Health Care Decision Maker:     Current Designated Health Care Decision Maker:   (If there is a valid [address] named in the [facility] Makers\" box in the ACP activity, but it is not visible above, be sure to open that field and then select the health care decision maker relationship (ie \"primary\") in the blank space to the right of the name.) Validate  this information as still accurate & up-to-date; edit [address] field as needed.)    Note: Assess and validate information in current ACP documents, as indicated. If no Decision Maker listed above or available through scanned documents, then:    If no Authorized Decision Maker has previously been identified, then patient chooses [address]:  \"Who would you like to name as your primary health care decision-maker? \"               Name: Emory Rodriguez         Relationship: son          Phone number: [phone]  [address] this person be reached easily? \" Yes  \"Who would you like to name as your back-up decision maker? \"   Name: no        Relationship: n0          Phone number: no  \"Can this person be reached easily? \" No    Note: If the relationship of these Decision-Makers to the patient does NOT follow your state's Next of Kin hierarchy, recommend that patient complete ACP document that meets state-specific requirements to allow them to act on the patient's behalf when appropriate. Care Preferences    Ventilation:   \"If you were in your present state of health and suddenly became very ill and were unable to breathe on your own, what would your preference be about the use of a ventilator (breathing machine) if it were available to you? \"      Would the patient desire the use of ventilator (breathing machine)?:     \"If your health worsens and it becomes clear that your chance of recovery is unlikely, what would your preference be about the use of a ventilator (breathing machine) if it were available to you? \"     Would the patient desire the use of ventilator (breathing machine)?:     Resuscitation  \"CPR works best to restart the heart when there is a sudden event, like a heart attack, in someone who is otherwise healthy. Unfortunately, CPR does not typically restart the heart for people who have serious health conditions or who are very sick. \"    \"In the event your heart stopped as a result of an underlying serious health condition, would you want attempts to be made to restart your heart (answer \"yes\" for attempt to resuscitate) or would you prefer a natural death (answer \"no\" for do not attempt to resuscitate)? \" no      NOTE: If the patient has a valid advance directive AND now provides care preference(s) that are inconsistent with that prior directive, advise the patient to consider either: creating a new advance directive that complies with state-specific requirements; or, if that is not possible, orally revoking that prior directive in accordance with state-specific requirements, which must be documented in the EHR. [x] Yes   [] No   Educated Patient / Lucas Rodriguez regarding differences between Advance Directives and portable DNR orders.     Length of ACP Conversation in minutes:      Conversation Outcomes:  [x] ACP discussion completed  [] Existing advance directive reviewed with patient; no changes to patient's previously recorded wishes  [] New Advance Directive completed  [] Portable Do Not Rescitate prepared for Provider review and signature  [] POLST/POST/MOLST/MOST prepared for

## 2020-09-27 NOTE — ED PROVIDER NOTES
eMERGENCY dEPARTMENT eNCOUnter        [facility]    Chief Complaint   Patient presents with    Pharyngitis     started last night        HPI    Moises Small is a 80 y.o. female who presents with a sore throat and cough for 2 days. Symptoms are described as being moderate in severity. Patient presented from the [facility]. Symptoms are described as being moderate in severity. REVIEW OF SYSTEMS    All systems reviewed and positives are in the HPI. PAST MEDICAL HISTORY    Past Medical History:   Diagnosis Date    GERD (gastroesophageal reflux disease)     Hyperlipidemia     Hypertension     Urinary incontinence        SURGICAL HISTORY    Past Surgical History:   Procedure Laterality Date    BUNIONECTOMY      CHOLECYSTECTOMY, LAPAROSCOPIC  12/26/12    TONSILLECTOMY         CURRENT MEDICATIONS    Current Outpatient Rx   Medication Sig Dispense Refill    hydrochlorothiazide (MICROZIDE) 12.5 MG capsule TAKE 1 CAPSULE EVERY DAY 90 capsule 1    naproxen (NAPROSYN) 500 MG tablet TAKE 1 TABLET TWICE DAILY WITH MEALS 180 tablet 1    omeprazole (PRILOSEC) 20 MG delayed release capsule TAKE 1 CAPSULE EVERY DAY 90 capsule 1    acetaminophen (TYLENOL) 325 MG tablet Take 650 mg by mouth every 6 hours as needed for Pain      calcium carbonate 600 MG TABS tablet Take 1 tablet by mouth daily.  docusate sodium (COLACE) 100 MG capsule Take 100 mg by mouth 2 times daily as needed for Constipation         ALLERGIES    No Known Allergies    FAMILY HISTORY    Family History   Problem Relation Age of Onset    Cancer Mother         Leukemia    Diabetes Father        SOCIAL HISTORY    Social History     Socioeconomic History    Marital status:       Spouse name: None    Number of children: None    Years of education: None    Highest education level: None   Occupational History    None   Social Needs    Financial resource strain: None    Food insecurity     Worry: None     Inability: Abnormal; Notable for the following components:       Result Value    SARS-CoV-2, Rapid DETECTED (*)     All other components within normal limits   URINALYSIS - Abnormal; Notable for the following components:    Color, UA STRAW (*)     All other components within normal limits   CBC WITH AUTO DIFFERENTIAL - Abnormal; Notable for the following components:    RDW 16.0 (*)     Monocytes 10 (*)     All other components within normal limits   COMPREHENSIVE METABOLIC PANEL - Abnormal; Notable for the following components:    BUN 27 (*)     CREATININE 1.30 (*)     Bun/Cre Ratio 21 (*)     Potassium 3.6 (*)     Alkaline Phosphatase 132 (*)     Total Bilirubin 0.16 (*)     GFR Non- 38 (*)     GFR  46 (*)     All other components within normal limits   STREP SCREEN GROUP A THROAT   CULTURE, BLOOD 1     Patient received Zosyn in the ER. She will be transferring to [address] Patient requested transfer to this facility. [address] [place] [address] was notified and accepted transfer. FINAL IMPRESSION    1.  COVID virus infection  2. Pneumonia    Summation      Patient Course: Patient will be transferred to [facility] AT [address] [place] [address]. Patient requested transfer to this facility. [address] [place] [address] was notified and accepted transfer. ED Medications administered this visit:    Medications   piperacillin-tazobactam (ZOSYN) 3.375 g in dextrose 5 % 50 mL IVPB (mini-bag) (0 g Intravenous Stopped 9/26/20 7548)       New Prescriptions from this visit:    Discharge Medication List as of 9/26/2020  8:57 PM          Follow-up:  No follow-up provider specified. Final Impression:   1. COVID-19 virus infection    2.  Pneumonia due to organism               (Please note that portions of this note were completed with a voice recognition program.  Efforts were made to edit the dictations but occasionally words are mis-transcribed.)     Raul Cardenas MD  09/26/20 2047       Margret Garcia MD  09/26/20 9381       Margret Garcia MD  10/03/20 3302

## 2020-10-02 LAB
CULTURE: NORMAL
Lab: NORMAL
SPECIMEN DESCRIPTION: NORMAL

## 2020-10-13 ENCOUNTER — TELEPHONE (OUTPATIENT)
Dept: FAMILY MEDICINE CLINIC | Age: 85
End: 2020-10-13

## 2020-10-13 NOTE — TELEPHONE ENCOUNTER
Milena Pascal wanted Tarun Arguello to call Dr. Danita Perkins. She has been admitted to [facility] for 7 days for COVID and just recently released. She is back on the skilled side at the [facility]. Every since having Yosef Ordonez has complained about having a sore throat. He said she has had lidocaine swab and another antibiotic. She is wanting to know if there is a shot Dr. Danita Perkins had to give her to help with her pain. Tarun Arguello knows she probably doesn't. I did let Tarun Arguello know with [facility] on the Skilled Nursing side we couldn't see her. He wanted us to just pass the message back to Dr. Danita Perkins so he can let his mom know that he did ask. Health Maintenance   Topic Date Due    Shingles Vaccine (1 of 2) 12/12/1974    Annual Wellness Visit (AWV)  05/29/2019    Flu vaccine (1) 09/01/2020    Potassium monitoring  09/26/2021    Creatinine monitoring  09/26/2021    DTaP/Tdap/Td vaccine (2 - Td) 06/18/2028    Pneumococcal 65+ years Vaccine  Completed    Hepatitis A vaccine  Aged Out    Hepatitis B vaccine  Aged Out    Hib vaccine  Aged Out    Meningococcal (ACWY) vaccine  Aged Out             (applicable per patient's age: Cancer Screenings, Depression Screening, Fall Risk Screening, Immunizations)    LDL Cholesterol (mg/dL)   Date Value   09/12/2016 138 (H)     AST (U/L)   Date Value   09/26/2020 31     ALT (U/L)   Date Value   09/26/2020 17     BUN (mg/dL)   Date Value   09/26/2020 27 (H)      (goal A1C is < 7)   (goal LDL is <100) need 30-50% reduction from baseline     BP Readings from Last 3 Encounters:   09/26/20 (!) 154/76   11/27/19 132/82   09/05/19 110/64    (goal /80)      All Future Testing planned in [facility]:  Lab Frequency Next Occurrence       Next Visit Date:  No future appointments.          Patient Active Problem List:     Esophageal reflux     Diaphragmatic hernia     Essential hypertension, benign     Pure hypercholesterolemia     Cholelithiases     Cholangitis     Endometrial thickening on ultrasound     Cervical stenosis (uterine cervix)     Arthritis of low back

## 2020-10-13 NOTE — TELEPHONE ENCOUNTER
Please tell Timothy Toscano that I did ask about her the other day and Titus Doherty filled me in. I had gotten records from [facility] so I was aware of her admission and she had COVID. Since she is on the skilled side I can't give the orders as he knows BUT I would suggest that she try warm salt water gargling  3 times per day --  Timothy Toscano could ask if the would order to start this. No shot.

## 2020-10-28 ENCOUNTER — TELEPHONE (OUTPATIENT)
Dept: FAMILY MEDICINE CLINIC | Age: 85
End: 2020-10-28

## 2020-10-28 NOTE — TELEPHONE ENCOUNTER
Patient has returned to assisted living form skilled nursing. Verbal order given to Marjorie Mar at [facility] [place] for OT/PT, recovering from Sb. Health Maintenance   Topic Date Due    Shingles Vaccine (1 of 2) 12/12/1974    Annual Wellness Visit (AWV)  05/29/2019    Flu vaccine (1) 09/01/2020    Potassium monitoring  09/26/2021    Creatinine monitoring  09/26/2021    DTaP/Tdap/Td vaccine (2 - Td) 06/18/2028    Pneumococcal 65+ years Vaccine  Completed    Hepatitis A vaccine  Aged Out    Hepatitis B vaccine  Aged Out    Hib vaccine  Aged Out    Meningococcal (ACWY) vaccine  Aged Out             (applicable per patient's age: Cancer Screenings, Depression Screening, Fall Risk Screening, Immunizations)    LDL Cholesterol (mg/dL)   Date Value   09/12/2016 138 (H)     AST (U/L)   Date Value   09/26/2020 31     ALT (U/L)   Date Value   09/26/2020 17     BUN (mg/dL)   Date Value   09/26/2020 27 (H)      (goal A1C is < 7)   (goal LDL is <100) need 30-50% reduction from baseline     BP Readings from Last 3 Encounters:   09/26/20 (!) 154/76   11/27/19 132/82   09/05/19 110/64    (goal /80)      All Future Testing planned in [facility]:  Lab Frequency Next Occurrence       Next Visit Date:  No future appointments.          Patient Active Problem List:     Esophageal reflux     Diaphragmatic hernia     Essential hypertension, benign     Pure hypercholesterolemia     Cholelithiases     Cholangitis     Endometrial thickening on ultrasound     Cervical stenosis (uterine cervix)     Arthritis of low back

## 2020-11-02 ENCOUNTER — TELEPHONE (OUTPATIENT)
Dept: FAMILY MEDICINE CLINIC | Age: 85
End: 2020-11-02

## 2020-11-02 NOTE — TELEPHONE ENCOUNTER
Tye Zabala informed BMP ordered to have done prior to appt 11/5/20. To review med list at appointment.

## 2020-11-02 NOTE — TELEPHONE ENCOUNTER
Patient is coming in on 11/05/20. Her son would like to know if she needs any labs prior to her appt? If she does he said to let [facility] know so they can kyara her here for labs.      Health Maintenance   Topic Date Due    Shingles Vaccine (1 of 2) 12/12/1974    Annual Wellness Visit (AWV)  05/29/2019    Flu vaccine (1) 09/01/2020    Potassium monitoring  09/26/2021    Creatinine monitoring  09/26/2021    DTaP/Tdap/Td vaccine (2 - Td) 06/18/2028    Pneumococcal 65+ years Vaccine  Completed    Hepatitis A vaccine  Aged Out    Hepatitis B vaccine  Aged Out    Hib vaccine  Aged Out    Meningococcal (ACWY) vaccine  Aged Out             (applicable per patient's age: Cancer Screenings, Depression Screening, Fall Risk Screening, Immunizations)    LDL Cholesterol (mg/dL)   Date Value   09/12/2016 138 (H)     AST (U/L)   Date Value   09/26/2020 31     ALT (U/L)   Date Value   09/26/2020 17     BUN (mg/dL)   Date Value   09/26/2020 27 (H)      (goal A1C is < 7)   (goal LDL is <100) need 30-50% reduction from baseline     BP Readings from Last 3 Encounters:   09/26/20 (!) 154/76   11/27/19 132/82   09/05/19 110/64    (goal /80)      All Future Testing planned in [facility]:  Lab Frequency Next Occurrence       Next Visit Date:  Future Appointments   Date Time Provider Damon Cesar   11/5/2020  2:00 PM [facility]            Patient Active Problem List:     Esophageal reflux     Diaphragmatic hernia     Essential hypertension, benign     Pure hypercholesterolemia     Cholelithiases     Cholangitis     Endometrial thickening on ultrasound     Cervical stenosis (uterine cervix)     Arthritis of low back

## 2020-11-05 ENCOUNTER — OFFICE VISIT (OUTPATIENT)
Dept: FAMILY MEDICINE CLINIC | Age: 85
End: 2020-11-05
Payer: MEDICARE

## 2020-11-05 VITALS
WEIGHT: 172 LBS | HEART RATE: 78 BPM | BODY MASS INDEX: 26.94 KG/M2 | OXYGEN SATURATION: 96 % | DIASTOLIC BLOOD PRESSURE: 80 MMHG | SYSTOLIC BLOOD PRESSURE: 132 MMHG

## 2020-11-05 PROBLEM — M46.90 INFLAMMATORY SPONDYLOPATHY (HCC): Status: ACTIVE | Noted: 2020-11-05

## 2020-11-05 PROBLEM — M46.90 INFLAMMATORY SPONDYLOPATHY (HCC): Status: RESOLVED | Noted: 2020-11-05 | Resolved: 2020-11-05

## 2020-11-05 PROBLEM — U07.1 DISEASE DUE TO SEVERE ACUTE RESPIRATORY SYNDROME CORONAVIRUS 2 (SARS-COV-2): Status: ACTIVE | Noted: 2020-09-26

## 2020-11-05 PROCEDURE — G8510 SCR DEP NEG, NO PLAN REQD: HCPCS | Performed by: FAMILY MEDICINE

## 2020-11-05 PROCEDURE — 90686 IIV4 VACC NO PRSV 0.5 ML IM: CPT | Performed by: FAMILY MEDICINE

## 2020-11-05 PROCEDURE — 1090F PRES/ABSN URINE INCON ASSESS: CPT | Performed by: FAMILY MEDICINE

## 2020-11-05 PROCEDURE — G8417 CALC BMI ABV UP PARAM F/U: HCPCS | Performed by: FAMILY MEDICINE

## 2020-11-05 PROCEDURE — 4040F PNEUMOC VAC/ADMIN/RCVD: CPT | Performed by: FAMILY MEDICINE

## 2020-11-05 PROCEDURE — G8427 DOCREV CUR MEDS BY ELIG CLIN: HCPCS | Performed by: FAMILY MEDICINE

## 2020-11-05 PROCEDURE — G8482 FLU IMMUNIZE ORDER/ADMIN: HCPCS | Performed by: FAMILY MEDICINE

## 2020-11-05 PROCEDURE — 99214 OFFICE O/P EST MOD 30 MIN: CPT | Performed by: FAMILY MEDICINE

## 2020-11-05 PROCEDURE — G0008 ADMIN INFLUENZA VIRUS VAC: HCPCS | Performed by: FAMILY MEDICINE

## 2020-11-05 PROCEDURE — 3288F FALL RISK ASSESSMENT DOCD: CPT | Performed by: FAMILY MEDICINE

## 2020-11-05 PROCEDURE — 1123F ACP DISCUSS/DSCN MKR DOCD: CPT | Performed by: FAMILY MEDICINE

## 2020-11-05 PROCEDURE — 1036F TOBACCO NON-USER: CPT | Performed by: FAMILY MEDICINE

## 2020-11-05 SDOH — ECONOMIC STABILITY: FOOD INSECURITY: WITHIN THE PAST 12 MONTHS, YOU WORRIED THAT YOUR FOOD WOULD RUN OUT BEFORE YOU GOT MONEY TO BUY MORE.: NEVER TRUE

## 2020-11-05 SDOH — ECONOMIC STABILITY: FOOD INSECURITY: WITHIN THE PAST 12 MONTHS, THE FOOD YOU BOUGHT JUST DIDN'T LAST AND YOU DIDN'T HAVE MONEY TO GET MORE.: NEVER TRUE

## 2020-11-05 SDOH — ECONOMIC STABILITY: INCOME INSECURITY: HOW HARD IS IT FOR YOU TO PAY FOR THE VERY BASICS LIKE FOOD, HOUSING, MEDICAL CARE, AND HEATING?: NOT HARD AT ALL

## 2020-11-05 ASSESSMENT — ENCOUNTER SYMPTOMS
NAUSEA: 1
CHOKING: 0
SORE THROAT: 0
BLOOD IN STOOL: 0
VOMITING: 0
ABDOMINAL PAIN: 1
BACK PAIN: 1
FACIAL SWELLING: 0
SHORTNESS OF BREATH: 0
EYE DISCHARGE: 0
EYE REDNESS: 0
HEARTBURN: 1
COUGH: 0

## 2020-11-05 ASSESSMENT — PATIENT HEALTH QUESTIONNAIRE - PHQ9
2. FEELING DOWN, DEPRESSED OR HOPELESS: 0
SUM OF ALL RESPONSES TO PHQ QUESTIONS 1-9: 0
SUM OF ALL RESPONSES TO PHQ QUESTIONS 1-9: 0
SUM OF ALL RESPONSES TO PHQ9 QUESTIONS 1 & 2: 0
1. LITTLE INTEREST OR PLEASURE IN DOING THINGS: 0
SUM OF ALL RESPONSES TO PHQ QUESTIONS 1-9: 0

## 2020-11-05 NOTE — PROGRESS NOTES
HPI Notes    Name: Jesusita Charles  : 1924        Chief Complaint:     Chief Complaint   Patient presents with    Hypertension    Gastroesophageal Reflux    Other     RLS - Pt taking tonic water at HS       History of Present Illness:     Jesusita Charles is a 80 y.o.  female who presents with Hypertension; Gastroesophageal Reflux; and Other (RLS - Pt taking tonic water at HS)      Hypertension   This is a chronic problem. The current episode started more than 1 year ago. The problem is unchanged. The problem is controlled. Associated symptoms include peripheral edema. Pertinent negatives include no chest pain, headaches, palpitations or shortness of breath. There are no associated agents to hypertension. Past treatments include diuretics. Improvement on treatment: pt currently is not on her BP medication -- HCTZ. Gastroesophageal Reflux   She complains of abdominal pain, heartburn and nausea. She reports no chest pain, no choking, no coughing, no dysphagia or no sore throat. decreased appetite. Pt points to stomach and into her back pain. . This is a chronic problem. The current episode started more than 1 year ago. The problem has been unchanged. Pertinent negatives include no melena. Treatments tried: Pt is no longer taking the prilosec since back at Assisted Living. Pt not sure when she was stopped from taking the prilosec. Back pain - pt does complain of some occ pain into her back. Pt does have h/o UTIs and urine frequency mainly at night. No burning with urination. No blood in urine. Pt is taking a stool softner to keep her bowel regular     COVID infection--- Pt had COVID back end of Sept and her biggest complaint was the sore throat. Pt did end up in [facility] with COVID for 10d. Pt was put on steroids, zinc and antiviral per her son Silvino Ang who is with her today. No breathing machine but was on oxygen for a while. Pt was thought to have slight pneumonia.  Pt then went [facility] to [facility] nursing until this past week or so when she returned to her assisted living room. Past Medical History:     Past Medical History:   Diagnosis Date    GERD (gastroesophageal reflux disease)     Hyperlipidemia     Hypertension     Urinary incontinence       Reviewed all health maintenance requirements and ordered appropriate tests  Health Maintenance Due   Topic Date Due    Shingles Vaccine (1 of 2) 12/12/1974    Annual Wellness Visit (AWV)  05/29/2019       Past Surgical History:     Past Surgical History:   Procedure Laterality Date    BUNIONECTOMY      CHOLECYSTECTOMY, LAPAROSCOPIC  12/26/12    TONSILLECTOMY          Medications:       Prior to Admission medications    Medication Sig Start Date End Date Taking? Authorizing Provider   docusate sodium (COLACE) 100 MG capsule Take 100 mg by mouth 2 times daily as needed for Constipation   Yes Historical Provider, MD   hydrochlorothiazide (MICROZIDE) 12.5 MG capsule TAKE 1 CAPSULE EVERY DAY  Patient not taking: Reported on 11/5/2020 4/26/20   Gabriel Blue MD   naproxen (NAPROSYN) 500 MG tablet TAKE 1 TABLET TWICE DAILY WITH MEALS 4/26/20   Gabriel Blue MD   omeprazole (PRILOSEC) 20 MG delayed release capsule TAKE 1 CAPSULE EVERY DAY  Patient not taking: Reported on 11/5/2020 4/26/20   Gabriel Blue MD   acetaminophen (TYLENOL) 325 MG tablet Take 650 mg by mouth every 6 hours as needed for Pain    Historical Provider, MD   calcium carbonate 600 MG TABS tablet Take 1 tablet by mouth daily. Historical Provider, MD        Allergies:       Patient has no known allergies. Social History:     Tobacco:    reports that she has never smoked. She has never used smokeless tobacco.  Alcohol:      reports no history of alcohol use. Drug Use:  reports no history of drug use.     Family History:     Family History   Problem Relation Age of Onset   [place] Cancer Mother         Leukemia    Diabetes Father        Review of Systems: Review of Systems   Constitutional: Negative for chills and fever. HENT: Negative for facial swelling and sore throat. Eyes: Negative for discharge and redness. Respiratory: Negative for cough, choking and shortness of breath. Cardiovascular: Negative for chest pain and palpitations. Gastrointestinal: Positive for abdominal pain, heartburn and nausea. Negative for blood in stool, dysphagia, melena and vomiting. Heartburn   Genitourinary: Negative for difficulty urinating and hematuria. Musculoskeletal: Positive for back pain. Skin: Negative for pallor and rash. Neurological: Negative for dizziness, facial asymmetry and headaches. Physical Exam:     Physical Exam  Vitals signs reviewed. Constitutional:       General: She is not in acute distress. Appearance: Normal appearance. She is well-developed. She is not ill-appearing. HENT:      Head: Normocephalic and atraumatic. Eyes:      General:         Right eye: No discharge. Left eye: No discharge. Conjunctiva/sclera: Conjunctivae normal.      Pupils: Pupils are equal, round, and reactive to light. Neck:      Musculoskeletal: Neck supple. Thyroid: No thyromegaly. Cardiovascular:      Rate and Rhythm: Normal rate and regular rhythm. Heart sounds: Normal heart sounds. No murmur. Pulmonary:      Effort: Pulmonary effort is normal.      Breath sounds: Normal breath sounds. Abdominal:      General: Bowel sounds are normal. There is no distension. Palpations: Abdomen is soft. Tenderness: There is no abdominal tenderness. Musculoskeletal:      Right lower leg: Edema present. Left lower leg: Edema present. Comments: Trace Bilateral LE swelling and no calf pain and no Jolene's bilateral    Lymphadenopathy:      Cervical: No cervical adenopathy. Skin:     Findings: No erythema or rash. Neurological:      General: No focal deficit present.       Mental Status: She is alert and oriented to person, place, and time. Psychiatric:         Mood and Affect: Mood normal.         Behavior: Behavior normal.         Vitals:  /80   Pulse 78   Wt 172 lb (78 kg)   LMP  (LMP Unknown)   SpO2 96%   BMI 26.94 kg/m²       Data:     Lab Results   Component Value Date     09/26/2020    K 3.6 09/26/2020     09/26/2020    CO2 25 09/26/2020    BUN 27 09/26/2020    CREATININE 1.30 09/26/2020    GLUCOSE 99 09/26/2020    GLUCOSE 103 06/06/2012    PROT 6.6 09/26/2020    LABALBU 3.9 09/26/2020    LABALBU 4.1 06/06/2012    BILITOT 0.16 09/26/2020    ALKPHOS 132 09/26/2020    AST 31 09/26/2020    ALT 17 09/26/2020     Lab Results   Component Value Date    WBC 6.9 09/26/2020    RBC 4.30 09/26/2020    RBC 4.73 12/14/2011    HGB 13.4 09/26/2020    HCT 40.2 09/26/2020    MCV 93.3 09/26/2020    MCH 31.1 09/26/2020    MCHC 33.4 09/26/2020    RDW 16.0 09/26/2020     09/26/2020     12/14/2011    MPV NOT REPORTED 09/26/2020     Lab Results   Component Value Date    TSH 2.36 10/25/2012     Lab Results   Component Value Date    CHOL 216 09/12/2016    HDL 56 09/12/2016          Assessment/Plan:        1. Essential hypertension, benign  Pt to go back on the HCTZ 12.5mg one daily    2. Gastroesophageal reflux disease without esophagitis  Pt to go back on prilosec 20mg one po every AM and start one tonight too x1 before supper    3. Acute midline low back pain without sciatica  Ck UA    4. History of 2019 novel coronavirus disease (COVID-19)  Resolved and pt is doing well    5. Need for influenza vaccination  Shot given  - INFLUENZA, QUADV, 3 YRS AND OLDER, IM PF, PREFILL SYR OR SDV, 0.5ML (AFLURIA QUADV, PF)        Renuka received counseling on the following healthy behaviors: nutrition and exercise  Reviewed prior labs and health maintenance  Continue current medications, diet and exercise. Discussed use, benefit, and side effects of prescribed medications.   Barriers to medication compliance addressed. Patient given educational materials - see patient instructions  Was a self-tracking handout given in paper form or via [employer]? Yes    Requested Prescriptions      No prescriptions requested or ordered in this encounter       All patient questions answered. Patient voiced understanding. Quality Measures    Body mass index is 26.94 kg/m². Normal. Weight control planned discussed Healthy diet and regular exercise. BP: 132/80. Blood pressure is normal. Treatment plan consists of No treatment change needed. Fall Risk 11/5/2020 5/29/2019 5/24/2018 7/18/2017 9/29/2015 8/26/2014   2 or more falls in past year? no yes no no no no   Fall with injury in past year? no yes no no no no     The patient does not have a history of falls. I did not - not indicated , complete a risk assessment for falls. A plan of care for falls No Treatment plan indicated    Lab Results   Component Value Date    LDLCHOLESTEROL 138 (H) 09/12/2016    (goal LDL reduction with dx if diabetes is 50% LDL reduction)    PHQ Scores 11/5/2020 5/29/2019 5/24/2018 7/18/2017 6/27/2016 2/25/2015   PHQ2 Score 0 0 0 0 0 0   PHQ9 Score 0 0 0 0 0 0     Interpretation of Total Score Depression Severity: 1-4 = Minimal depression, 5-9 = Mild depression, 10-14 = Moderate depression, 15-19 = Moderately severe depression, 20-27 = Severe depression        Return in about 6 months (around 5/5/2021).       Electronically signed by Sadie Glover MD on 11/5/2020 at 5:45 PM

## 2020-11-09 ENCOUNTER — TELEPHONE (OUTPATIENT)
Dept: FAMILY MEDICINE CLINIC | Age: 85
End: 2020-11-09

## 2020-11-09 RX ORDER — NITROFURANTOIN MACROCRYSTALS 100 MG/1
100 CAPSULE ORAL 2 TIMES DAILY
Qty: 14 CAPSULE | Refills: 0 | Status: SHIPPED | OUTPATIENT
Start: 2020-11-09 | End: 2020-11-16

## 2020-11-13 ENCOUNTER — TELEPHONE (OUTPATIENT)
Dept: FAMILY MEDICINE CLINIC | Age: 85
End: 2020-11-13

## 2020-11-13 RX ORDER — OMEPRAZOLE 20 MG/1
CAPSULE, DELAYED RELEASE ORAL
Qty: 90 CAPSULE | Refills: 1 | Status: SHIPPED | OUTPATIENT
Start: 2020-11-13 | End: 2021-05-07

## 2020-11-13 RX ORDER — HYDROCHLOROTHIAZIDE 12.5 MG/1
CAPSULE, GELATIN COATED ORAL
Qty: 90 CAPSULE | Refills: 1 | Status: SHIPPED | OUTPATIENT
Start: 2020-11-13 | End: 2021-05-07

## 2020-11-13 NOTE — TELEPHONE ENCOUNTER
Per  send in Rx's for hctz and omeprazole   #90 x 1 refill to [address] [place] [address]     Rx's sent

## 2021-04-13 ENCOUNTER — OFFICE VISIT (OUTPATIENT)
Dept: FAMILY MEDICINE CLINIC | Age: 86
End: 2021-04-13
Payer: MEDICARE

## 2021-04-13 VITALS
SYSTOLIC BLOOD PRESSURE: 130 MMHG | OXYGEN SATURATION: 96 % | DIASTOLIC BLOOD PRESSURE: 62 MMHG | HEART RATE: 84 BPM | BODY MASS INDEX: 27.1 KG/M2 | WEIGHT: 173 LBS

## 2021-04-13 DIAGNOSIS — L82.1 SEBORRHEIC KERATOSES: ICD-10-CM

## 2021-04-13 DIAGNOSIS — R10.84 GENERALIZED ABDOMINAL PAIN: Primary | ICD-10-CM

## 2021-04-13 DIAGNOSIS — L57.0 ACTINIC KERATOSIS: ICD-10-CM

## 2021-04-13 PROCEDURE — G8427 DOCREV CUR MEDS BY ELIG CLIN: HCPCS | Performed by: FAMILY MEDICINE

## 2021-04-13 PROCEDURE — 1090F PRES/ABSN URINE INCON ASSESS: CPT | Performed by: FAMILY MEDICINE

## 2021-04-13 PROCEDURE — 1123F ACP DISCUSS/DSCN MKR DOCD: CPT | Performed by: FAMILY MEDICINE

## 2021-04-13 PROCEDURE — G8417 CALC BMI ABV UP PARAM F/U: HCPCS | Performed by: FAMILY MEDICINE

## 2021-04-13 PROCEDURE — 17003 DESTRUCT PREMALG LES 2-14: CPT | Performed by: FAMILY MEDICINE

## 2021-04-13 PROCEDURE — 99213 OFFICE O/P EST LOW 20 MIN: CPT | Performed by: FAMILY MEDICINE

## 2021-04-13 PROCEDURE — 4040F PNEUMOC VAC/ADMIN/RCVD: CPT | Performed by: FAMILY MEDICINE

## 2021-04-13 PROCEDURE — 1036F TOBACCO NON-USER: CPT | Performed by: FAMILY MEDICINE

## 2021-04-13 PROCEDURE — 17000 DESTRUCT PREMALG LESION: CPT | Performed by: FAMILY MEDICINE

## 2021-04-13 ASSESSMENT — PATIENT HEALTH QUESTIONNAIRE - PHQ9
SUM OF ALL RESPONSES TO PHQ QUESTIONS 1-9: 0
SUM OF ALL RESPONSES TO PHQ QUESTIONS 1-9: 0
1. LITTLE INTEREST OR PLEASURE IN DOING THINGS: 0
SUM OF ALL RESPONSES TO PHQ9 QUESTIONS 1 & 2: 0
2. FEELING DOWN, DEPRESSED OR HOPELESS: 0

## 2021-04-13 ASSESSMENT — ENCOUNTER SYMPTOMS: ABDOMINAL PAIN: 1

## 2021-04-13 NOTE — PROGRESS NOTES
HPI Notes    Name: Katie Machado  : 1924        Chief Complaint:     Chief Complaint   Patient presents with    Abdominal Pain     Pt states abdominal cramping has resolved. Pt states she was having low abdominal cramping was three - five weeks ago. Pt states she is fine now.  Lesion(s)     Pt would like Dr to evaluate brown lesions on her face. History of Present Illness:     Katie Machado is a 80 y.o.  female who presents with Abdominal Pain (Pt states abdominal cramping has resolved. Pt states she was having low abdominal cramping was three - five weeks ago. Pt states she is fine now.) and Lesion(s) (Pt would like Dr to evaluate brown lesions on her face. )      Abdominal Pain  This is a recurrent problem. Episode onset: Pt states she had her lower abdominal \"feels like menstrual cramps' pain. however, pt has had this pain in past and normal pelvic u/s. The onset quality is gradual. The problem has been resolved (Pt states the abdominal pain now gone. Pt not sure if related to bowels and gas?). The quality of the pain is cramping. The abdominal pain does not radiate. Pertinent negatives include no belching, constipation, diarrhea, dysuria, fever, hematochezia, hematuria, melena, vomiting or weight loss. Nothing aggravates the pain. The pain is relieved by nothing. She has tried nothing for the symptoms.    Skin lesions - pt has some brown lesion on face she wants looked at and some other white areas she has had frozen in the past.     Past Medical History:     Past Medical History:   Diagnosis Date    GERD (gastroesophageal reflux disease)     Hyperlipidemia     Hypertension     Urinary incontinence       Reviewed all health maintenance requirements and ordered appropriate tests  Health Maintenance Due   Topic Date Due    Shingles Vaccine (1 of 2) Never done   [facility] Annual Wellness Visit (AWV)  Never done       Past Surgical History:     Past Surgical History:   Procedure Laterality Date    BUNIONECTOMY      CHOLECYSTECTOMY, LAPAROSCOPIC  12/26/12    TONSILLECTOMY          Medications:       Prior to Admission medications    Medication Sig Start Date End Date Taking? Authorizing Provider   hydroCHLOROthiazide (MICROZIDE) 12.5 MG capsule TAKE 1 CAPSULE EVERY DAY 11/13/20  Yes Shweta Mcnulty MD   omeprazole (PRILOSEC) 20 MG delayed release capsule TAKE 1 CAPSULE EVERY DAY 11/13/20  Yes Shweta Mcnulty MD   docusate sodium (COLACE) 100 MG capsule Take 100 mg by mouth 2 times daily as needed for Constipation   Yes Historical Provider, MD   naproxen (NAPROSYN) 500 MG tablet TAKE 1 TABLET TWICE DAILY WITH MEALS  Patient not taking: Reported on 4/13/2021 4/26/20   Shweta Mcnulty MD   acetaminophen (TYLENOL) 325 MG tablet Take 650 mg by mouth every 6 hours as needed for Pain    Historical Provider, MD        Allergies:       Patient has no known allergies. Social History:     Tobacco:    reports that she has never smoked. She has never used smokeless tobacco.  Alcohol:      reports no history of alcohol use. Drug Use:  reports no history of drug use. Family History:     Family History   Problem Relation Age of Onset   Jason Valienteer Cancer Mother         Leukemia    Diabetes Father        Review of Systems:       Review of Systems   Constitutional: Negative for chills, fever and weight loss. Respiratory: Negative for cough and shortness of breath. Gastrointestinal: Positive for abdominal pain. Negative for constipation, diarrhea, hematochezia, melena and vomiting. Per pt the abdominal pain is ALL gone now. Genitourinary: Negative for dysuria and hematuria. Skin: Negative for color change. Skin lesions         Physical Exam:     Physical Exam  Vitals signs reviewed. Constitutional:       General: She is not in acute distress. Appearance: Normal appearance. She is not ill-appearing. HENT:      Head: Normocephalic and atraumatic.    Cardiovascular:      Rate and Rhythm: Normal rate and regular rhythm. Heart sounds: Normal heart sounds. Pulmonary:      Effort: Pulmonary effort is normal. No respiratory distress. Breath sounds: Normal breath sounds. No wheezing. Abdominal:      General: Bowel sounds are normal. There is no distension. Palpations: Abdomen is soft. Tenderness: There is no abdominal tenderness. There is no guarding. Skin:            Comments: A - just above Rt and Lt eyebrows are white rough patches and used liquid nitrogen to freeze and pt tolerated well. B - scattered on bilateral cheeks and forehead with tan to brown oval \"stuck on\" lesions         Vitals:  /62   Pulse 84   Wt 173 lb (78.5 kg)   LMP  (LMP Unknown)   SpO2 96%   BMI 27.10 kg/m²       Data:     Lab Results   Component Value Date     09/26/2020    K 3.6 09/26/2020     09/26/2020    CO2 25 09/26/2020    BUN 27 09/26/2020    CREATININE 1.30 09/26/2020    GLUCOSE 99 09/26/2020    GLUCOSE 103 06/06/2012    PROT 6.6 09/26/2020    LABALBU 3.9 09/26/2020    LABALBU 4.1 06/06/2012    BILITOT 0.16 09/26/2020    ALKPHOS 132 09/26/2020    AST 31 09/26/2020    ALT 17 09/26/2020     Lab Results   Component Value Date    WBC 6.9 09/26/2020    RBC 4.30 09/26/2020    RBC 4.73 12/14/2011    HGB 13.4 09/26/2020    HCT 40.2 09/26/2020    MCV 93.3 09/26/2020    MCH 31.1 09/26/2020    MCHC 33.4 09/26/2020    RDW 16.0 09/26/2020     09/26/2020     12/14/2011    MPV NOT REPORTED 09/26/2020     Lab Results   Component Value Date    TSH 2.36 10/25/2012     Lab Results   Component Value Date    CHOL 216 09/12/2016    HDL 56 09/12/2016          Assessment/Plan:        1. Generalized abdominal pain  Resolved for now and pt to monitor what she eats and see if happens again. Did tell pt and he son the PPIs can cause more gas too sometimes. 2. Actinic keratosis  Pre cancerous lesions and used liquid nitrogen to freeze and pt tolerated well.    - [address] - [place] [address], FIRST LESION  - [address]363 - [place] [address] LESIONS    3. Seborrheic keratoses  D/w pt benign lesions and just monitor     Return in about 6 months (around 10/13/2021) for HTN, gerd, AMV.       Electronically signed by Tiburcio Gowers, MD on 4/16/2021 at 7:39 PM

## 2021-04-16 ASSESSMENT — ENCOUNTER SYMPTOMS
DIARRHEA: 0
BELCHING: 0
COLOR CHANGE: 0
CONSTIPATION: 0
SHORTNESS OF BREATH: 0
VOMITING: 0
HEMATOCHEZIA: 0
ROS SKIN COMMENTS: SKIN LESIONS
COUGH: 0

## 2021-05-07 RX ORDER — HYDROCHLOROTHIAZIDE 12.5 MG/1
CAPSULE, GELATIN COATED ORAL
Qty: 90 CAPSULE | Refills: 1 | Status: SHIPPED | OUTPATIENT
Start: 2021-05-07 | End: 2021-10-14 | Stop reason: SDUPTHER

## 2021-05-07 RX ORDER — OMEPRAZOLE 20 MG/1
CAPSULE, DELAYED RELEASE ORAL
Qty: 90 CAPSULE | Refills: 1 | Status: SHIPPED | OUTPATIENT
Start: 2021-05-07 | End: 2021-10-14 | Stop reason: SDUPTHER

## 2021-05-07 NOTE — TELEPHONE ENCOUNTER
Last OV: 4/13/2021 generalized abd pain  Last RX:   Next scheduled apt: 10/14/2021        Sure scripts request      RX pending

## 2021-06-01 ENCOUNTER — TELEPHONE (OUTPATIENT)
Dept: FAMILY MEDICINE CLINIC | Age: 86
End: 2021-06-01

## 2021-06-01 NOTE — TELEPHONE ENCOUNTER
Resident has multiple small scabbed areas to B/L posterior thighs from toilet rise. Nurses will monitor daily until healed.

## 2021-10-14 ENCOUNTER — OFFICE VISIT (OUTPATIENT)
Dept: FAMILY MEDICINE CLINIC | Age: 86
End: 2021-10-14
Payer: MEDICARE

## 2021-10-14 VITALS
DIASTOLIC BLOOD PRESSURE: 60 MMHG | BODY MASS INDEX: 27.78 KG/M2 | HEIGHT: 67 IN | SYSTOLIC BLOOD PRESSURE: 122 MMHG | OXYGEN SATURATION: 96 % | HEART RATE: 82 BPM | WEIGHT: 177 LBS

## 2021-10-14 DIAGNOSIS — K21.9 GASTROESOPHAGEAL REFLUX DISEASE WITHOUT ESOPHAGITIS: ICD-10-CM

## 2021-10-14 DIAGNOSIS — Z23 NEED FOR INFLUENZA VACCINATION: ICD-10-CM

## 2021-10-14 DIAGNOSIS — L98.9 NON-HEALING SKIN LESION: ICD-10-CM

## 2021-10-14 DIAGNOSIS — I10 ESSENTIAL HYPERTENSION, BENIGN: Primary | ICD-10-CM

## 2021-10-14 PROCEDURE — 90694 VACC AIIV4 NO PRSRV 0.5ML IM: CPT | Performed by: FAMILY MEDICINE

## 2021-10-14 PROCEDURE — 1036F TOBACCO NON-USER: CPT | Performed by: FAMILY MEDICINE

## 2021-10-14 PROCEDURE — G0008 ADMIN INFLUENZA VIRUS VAC: HCPCS | Performed by: FAMILY MEDICINE

## 2021-10-14 PROCEDURE — 99214 OFFICE O/P EST MOD 30 MIN: CPT | Performed by: FAMILY MEDICINE

## 2021-10-14 PROCEDURE — 1123F ACP DISCUSS/DSCN MKR DOCD: CPT | Performed by: FAMILY MEDICINE

## 2021-10-14 PROCEDURE — G8427 DOCREV CUR MEDS BY ELIG CLIN: HCPCS | Performed by: FAMILY MEDICINE

## 2021-10-14 PROCEDURE — 0518F FALL PLAN OF CARE DOCD: CPT | Performed by: FAMILY MEDICINE

## 2021-10-14 PROCEDURE — G8484 FLU IMMUNIZE NO ADMIN: HCPCS | Performed by: FAMILY MEDICINE

## 2021-10-14 PROCEDURE — 1090F PRES/ABSN URINE INCON ASSESS: CPT | Performed by: FAMILY MEDICINE

## 2021-10-14 PROCEDURE — G8417 CALC BMI ABV UP PARAM F/U: HCPCS | Performed by: FAMILY MEDICINE

## 2021-10-14 PROCEDURE — 4040F PNEUMOC VAC/ADMIN/RCVD: CPT | Performed by: FAMILY MEDICINE

## 2021-10-14 PROCEDURE — 3288F FALL RISK ASSESSMENT DOCD: CPT | Performed by: FAMILY MEDICINE

## 2021-10-14 RX ORDER — HYDROCHLOROTHIAZIDE 12.5 MG/1
CAPSULE, GELATIN COATED ORAL
Qty: 90 CAPSULE | Refills: 1 | Status: SHIPPED | OUTPATIENT
Start: 2021-10-14 | End: 2022-02-28

## 2021-10-14 RX ORDER — OMEPRAZOLE 20 MG/1
CAPSULE, DELAYED RELEASE ORAL
Qty: 90 CAPSULE | Refills: 1 | Status: SHIPPED | OUTPATIENT
Start: 2021-10-14 | End: 2022-02-02 | Stop reason: DRUGHIGH

## 2021-10-14 ASSESSMENT — LIFESTYLE VARIABLES: HOW OFTEN DO YOU HAVE A DRINK CONTAINING ALCOHOL: 0

## 2021-10-14 ASSESSMENT — PATIENT HEALTH QUESTIONNAIRE - PHQ9
SUM OF ALL RESPONSES TO PHQ QUESTIONS 1-9: 0
1. LITTLE INTEREST OR PLEASURE IN DOING THINGS: 0
2. FEELING DOWN, DEPRESSED OR HOPELESS: 0
SUM OF ALL RESPONSES TO PHQ QUESTIONS 1-9: 0
SUM OF ALL RESPONSES TO PHQ9 QUESTIONS 1 & 2: 0
SUM OF ALL RESPONSES TO PHQ QUESTIONS 1-9: 0

## 2021-10-14 NOTE — PROGRESS NOTES
HPI Notes    Name: Pascual Vasques  : 1924        Chief Complaint:     Chief Complaint   Patient presents with    Medicare AWV    Hypertension    Gastroesophageal Reflux    Other     RLS -        History of Present Illness:     Pascual Vasques is a 80 y.o.  female who presents with Medicare AWV, Hypertension, Gastroesophageal Reflux, and Other (RLS - )      Hypertension  This is a chronic problem. The current episode started more than 1 year ago. The problem is unchanged. The problem is controlled. Pertinent negatives include no chest pain, headaches, neck pain, palpitations, peripheral edema or shortness of breath. There are no associated agents to hypertension. There are no known risk factors for coronary artery disease. The current treatment provides significant improvement. Gastroesophageal Reflux  She reports no abdominal pain, no chest pain, no choking, no coughing, no dysphagia, no heartburn or no nausea. This is a chronic problem. The current episode started more than 1 year ago. The problem has been unchanged. Pertinent negatives include no fatigue, melena or weight loss. She has tried a PPI for the symptoms. The treatment provided significant relief. pt has no complaints of restless leg. Stable and pt has no complaints about this today. Pt is taking no medication. Pt is overall sleeping and doing well. Non healing lesion - pt continues to have a non healing lesion on her forehead. Pt has had frozen in the past but now seems to be more pink and growing. NO bleeding or drainage.       Past Medical History:     Past Medical History:   Diagnosis Date    GERD (gastroesophageal reflux disease)     Hyperlipidemia     Hypertension     Urinary incontinence       Reviewed all health maintenance requirements and ordered appropriate tests  Health Maintenance Due   Topic Date Due    Shingles Vaccine (1 of 2) Never done   [employer] Visit (AWV)  Never done    Potassium monitoring 09/26/2021    Creatinine monitoring  09/26/2021       Past Surgical History:     Past Surgical History:   Procedure Laterality Date    BUNIONECTOMY      CHOLECYSTECTOMY, LAPAROSCOPIC  12/26/12    TONSILLECTOMY          Medications:       Prior to Admission medications    Medication Sig Start Date End Date Taking? Authorizing Provider   hydroCHLOROthiazide (MICROZIDE) 12.5 MG capsule TAKE 1 CAPSULE EVERY DAY 10/14/21  Yes Angel Sánchez MD   omeprazole (PRILOSEC) 20 MG delayed release capsule TAKE 1 CAPSULE EVERY DAY 10/14/21  Yes Angel Sánchez MD   docusate sodium (COLACE) 100 MG capsule Take 100 mg by mouth 2 times daily as needed for Constipation   Yes Historical Provider, MD   naproxen (NAPROSYN) 500 MG tablet TAKE 1 TABLET TWICE DAILY WITH MEALS  Patient not taking: Reported on 4/13/2021 4/26/20   Angel Sánchez MD   acetaminophen (TYLENOL) 325 MG tablet Take 650 mg by mouth every 6 hours as needed for Pain  Patient not taking: Reported on 10/14/2021    Historical Provider, MD        Allergies:       Patient has no known allergies. Social History:     Tobacco:    reports that she has never smoked. She has never used smokeless tobacco.  Alcohol:      reports no history of alcohol use. Drug Use:  reports no history of drug use. Family History:     Family History   Problem Relation Age of Onset    Cancer Mother         Leukemia    Diabetes Father        Review of Systems:       Review of Systems   Constitutional: Negative for chills, fatigue, fever, unexpected weight change and weight loss. Eyes: Negative for discharge, redness and visual disturbance. Respiratory: Negative for cough, choking and shortness of breath. Cardiovascular: Negative for chest pain and palpitations. Gastrointestinal: Negative for abdominal pain, blood in stool, constipation, diarrhea, dysphagia, heartburn, melena, nausea and vomiting. Genitourinary: Negative for dysuria and hematuria.    Musculoskeletal: Negative for joint swelling and neck pain. Skin: Negative for rash. Non healing and growing skin lesion   Neurological: Negative for dizziness, facial asymmetry, light-headedness and headaches. Psychiatric/Behavioral: Negative for sleep disturbance. Physical Exam:     Physical Exam  Vitals reviewed. Constitutional:       General: She is not in acute distress. Appearance: Normal appearance. She is well-developed. She is not ill-appearing. HENT:      Head: Normocephalic and atraumatic. Eyes:      General:         Right eye: No discharge. Left eye: No discharge. Conjunctiva/sclera: Conjunctivae normal.   Neck:      Thyroid: No thyromegaly. Cardiovascular:      Rate and Rhythm: Normal rate and regular rhythm. Heart sounds: Normal heart sounds. No murmur heard. Pulmonary:      Effort: Pulmonary effort is normal.      Breath sounds: Normal breath sounds. Abdominal:      General: Bowel sounds are normal.      Palpations: Abdomen is soft. Tenderness: There is no abdominal tenderness. Musculoskeletal:      Cervical back: Neck supple. Right lower leg: No edema. Left lower leg: No edema. Lymphadenopathy:      Cervical: No cervical adenopathy. Skin:     Findings: No erythema or rash. Comments: Mid forehead with about 1cm circular pink lesion   Neurological:      General: No focal deficit present. Mental Status: She is alert and oriented to person, place, and time.    Psychiatric:         Mood and Affect: Mood normal.         Behavior: Behavior normal.         Vitals:  /60   Pulse 82   Ht 5' 7\" (1.702 m)   Wt 177 lb (80.3 kg)   LMP  (LMP Unknown)   SpO2 96%   BMI 27.72 kg/m²       Data:     Lab Results   Component Value Date     09/26/2020    K 3.6 09/26/2020     09/26/2020    CO2 25 09/26/2020    BUN 27 09/26/2020    CREATININE 1.30 09/26/2020    GLUCOSE 99 09/26/2020    GLUCOSE 103 06/06/2012    PROT 6.6 09/26/2020 LABALBU 3.9 09/26/2020    LABALBU 4.1 06/06/2012    BILITOT 0.16 09/26/2020    ALKPHOS 132 09/26/2020    AST 31 09/26/2020    ALT 17 09/26/2020     Lab Results   Component Value Date    WBC 6.9 09/26/2020    RBC 4.30 09/26/2020    RBC 4.73 12/14/2011    HGB 13.4 09/26/2020    HCT 40.2 09/26/2020    MCV 93.3 09/26/2020    MCH 31.1 09/26/2020    MCHC 33.4 09/26/2020    RDW 16.0 09/26/2020     09/26/2020     12/14/2011    MPV NOT REPORTED 09/26/2020     Lab Results   Component Value Date    TSH 2.36 10/25/2012     Lab Results   Component Value Date    CHOL 216 09/12/2016    HDL 56 09/12/2016          Assessment/Plan:        1. Essential hypertension, benign  Stable on HCTZ    2. Gastroesophageal reflux disease without esophagitis  Stable on prilosec    3. Non-healing skin lesion  Referral to Dermatology  - External Referral To Dermatology    4. Need for influenza vaccination  Shot given   - INFLUENZA, QUADV, ADJUVANTED, 65 YRS =, IM, PF, PREFILL SYR, 0.5ML (FLUAD)        Renuka received counseling on the following healthy behaviors: nutrition and exercise  Reviewed prior labs and health maintenance  Continue current medications, diet and exercise. Discussed use, benefit, and side effects of prescribed medications. Barriers to medication compliance addressed. Patient given educational materials - see patient instructions  Was a self-tracking handout given in paper form or via [employer]? Yes    Requested Prescriptions     Signed Prescriptions Disp Refills    hydroCHLOROthiazide (MICROZIDE) 12.5 MG capsule 90 capsule 1     Sig: TAKE 1 CAPSULE EVERY DAY    omeprazole (PRILOSEC) 20 MG delayed release capsule 90 capsule 1     Sig: TAKE 1 CAPSULE EVERY DAY       All patient questions answered. Patient voiced understanding. Quality Measures    Body mass index is 27.72 kg/m². Elevated. Weight control planned discussed Healthy diet and regular exercise. BP: 122/60.  Blood pressure is normal. Treatment plan consists of No treatment change needed. Fall Risk 10/14/2021 11/5/2020 5/29/2019 5/24/2018 7/18/2017 9/29/2015 8/26/2014   2 or more falls in past year? no no yes no no no no   Fall with injury in past year? no no yes no no no no     The patient has a history of falls. I did not - not indicated , complete a risk assessment for falls. A plan of care for falls pt uses a walker at all times and living in Assisted Living, No Treatment plan indicated    Lab Results   Component Value Date    LDLCHOLESTEROL 138 (H) 09/12/2016    (goal LDL reduction with dx if diabetes is 50% LDL reduction)    PHQ Scores 10/14/2021 4/13/2021 11/5/2020 5/29/2019 5/24/2018 7/18/2017 6/27/2016   PHQ2 Score 0 0 0 0 0 0 0   PHQ9 Score 0 0 0 0 0 0 0     Interpretation of Total Score Depression Severity: 1-4 = Minimal depression, 5-9 = Mild depression, 10-14 = Moderate depression, 15-19 = Moderately severe depression, 20-27 = Severe depression        Return in about 6 months (around 4/14/2022) for HTN, gerd and medicare.       Electronically signed by Gopi Simmons MD on 10/15/2021 at 2:37 PM

## 2021-10-15 ASSESSMENT — ENCOUNTER SYMPTOMS
DIARRHEA: 0
EYE REDNESS: 0
CONSTIPATION: 0
NAUSEA: 0
HEARTBURN: 0
VOMITING: 0
CHOKING: 0
BLOOD IN STOOL: 0
SHORTNESS OF BREATH: 0
ABDOMINAL PAIN: 0
EYE DISCHARGE: 0
COUGH: 0

## 2022-02-02 ENCOUNTER — OFFICE VISIT (OUTPATIENT)
Dept: FAMILY MEDICINE CLINIC | Age: 87
End: 2022-02-02
Payer: MEDICARE

## 2022-02-02 VITALS
HEART RATE: 76 BPM | HEIGHT: 67 IN | WEIGHT: 178 LBS | OXYGEN SATURATION: 94 % | SYSTOLIC BLOOD PRESSURE: 114 MMHG | BODY MASS INDEX: 27.94 KG/M2 | DIASTOLIC BLOOD PRESSURE: 68 MMHG

## 2022-02-02 DIAGNOSIS — K21.9 GASTROESOPHAGEAL REFLUX DISEASE WITHOUT ESOPHAGITIS: Primary | ICD-10-CM

## 2022-02-02 PROCEDURE — G8484 FLU IMMUNIZE NO ADMIN: HCPCS | Performed by: FAMILY MEDICINE

## 2022-02-02 PROCEDURE — 1123F ACP DISCUSS/DSCN MKR DOCD: CPT | Performed by: FAMILY MEDICINE

## 2022-02-02 PROCEDURE — G8417 CALC BMI ABV UP PARAM F/U: HCPCS | Performed by: FAMILY MEDICINE

## 2022-02-02 PROCEDURE — 1036F TOBACCO NON-USER: CPT | Performed by: FAMILY MEDICINE

## 2022-02-02 PROCEDURE — 99213 OFFICE O/P EST LOW 20 MIN: CPT | Performed by: FAMILY MEDICINE

## 2022-02-02 PROCEDURE — G8427 DOCREV CUR MEDS BY ELIG CLIN: HCPCS | Performed by: FAMILY MEDICINE

## 2022-02-02 PROCEDURE — 4040F PNEUMOC VAC/ADMIN/RCVD: CPT | Performed by: FAMILY MEDICINE

## 2022-02-02 PROCEDURE — 3288F FALL RISK ASSESSMENT DOCD: CPT | Performed by: FAMILY MEDICINE

## 2022-02-02 PROCEDURE — 0518F FALL PLAN OF CARE DOCD: CPT | Performed by: FAMILY MEDICINE

## 2022-02-02 PROCEDURE — 1090F PRES/ABSN URINE INCON ASSESS: CPT | Performed by: FAMILY MEDICINE

## 2022-02-02 RX ORDER — MUSCLE RUB CREAM 100; 150 MG/G; MG/G
CREAM TOPICAL
COMMUNITY

## 2022-02-02 RX ORDER — OMEPRAZOLE 20 MG/1
CAPSULE, DELAYED RELEASE ORAL
Qty: 180 CAPSULE | Refills: 1 | Status: SHIPPED
Start: 2022-02-02 | End: 2022-02-28

## 2022-02-02 SDOH — ECONOMIC STABILITY: FOOD INSECURITY: WITHIN THE PAST 12 MONTHS, YOU WORRIED THAT YOUR FOOD WOULD RUN OUT BEFORE YOU GOT MONEY TO BUY MORE.: NEVER TRUE

## 2022-02-02 SDOH — ECONOMIC STABILITY: FOOD INSECURITY: WITHIN THE PAST 12 MONTHS, THE FOOD YOU BOUGHT JUST DIDN'T LAST AND YOU DIDN'T HAVE MONEY TO GET MORE.: NEVER TRUE

## 2022-02-02 ASSESSMENT — ENCOUNTER SYMPTOMS
DIARRHEA: 0
SHORTNESS OF BREATH: 0
EYE REDNESS: 0
FACIAL SWELLING: 0
NAUSEA: 0
VOMITING: 0
HEMATOCHEZIA: 0
CONSTIPATION: 0
COUGH: 0
EYE DISCHARGE: 0
ABDOMINAL PAIN: 1

## 2022-02-02 ASSESSMENT — SOCIAL DETERMINANTS OF HEALTH (SDOH): HOW HARD IS IT FOR YOU TO PAY FOR THE VERY BASICS LIKE FOOD, HOUSING, MEDICAL CARE, AND HEATING?: NOT VERY HARD

## 2022-02-02 NOTE — PROGRESS NOTES
HPI Notes    Name: Theotis Lefort  : 1924        Chief Complaint:     Chief Complaint   Patient presents with    Abdominal Pain     c/o stomach \"burning\". Sx's recurring, worse at night. History of Present Illness:     Theotis Lefort is a 80 y.o.  female who presents with Abdominal Pain (c/o stomach \"burning\". Sx's recurring, worse at night. )      Abdominal Pain  This is a recurrent problem. Episode onset: Last several weeks she has been complaining about a \"sour stomach\". No N/V. Pt has h/o EGD showing she has a hiatal hernia. The onset quality is sudden. The problem occurs daily. Progression since onset: pt feels like it is better today. The abdominal pain radiates to the epigastric region. Pertinent negatives include no constipation, diarrhea, fever, hematochezia, hematuria, melena, nausea, vomiting or weight loss. Associated symptoms comments: occ burping . Exacerbated by: not sure but in reviewing her food and drink for the days --- she admits she drinks TWO cups caffienated green tea at all 3 meals every day. She has tried proton pump inhibitors (pt does take prilosec once a day.) for the symptoms. The treatment provided moderate relief. Prior diagnostic workup includes upper endoscopy.        Past Medical History:     Past Medical History:   Diagnosis Date    GERD (gastroesophageal reflux disease)     Hyperlipidemia     Hypertension     Urinary incontinence       Reviewed all health maintenance requirements and ordered appropriate tests  Health Maintenance Due   Topic Date Due    Shingles Vaccine (1 of 2) Never done   [employer] Visit (AWV)  Never done    Potassium monitoring  2021    Creatinine monitoring  2021       Past Surgical History:     Past Surgical History:   Procedure Laterality Date    BUNIONECTOMY      CHOLECYSTECTOMY, LAPAROSCOPIC  12    TONSILLECTOMY          Medications:       Prior to Admission medications    Medication Sig Start Date End Date Taking? Authorizing Provider   omeprazole (PRILOSEC) 20 MG delayed release capsule TAKE 1 CAPSULE 2 TIMES DAILY. TAKE 30 MINUTES BEFORE EATING. 2/2/22  Yes Juan Miguel Dominique MD   hydroCHLOROthiazide (MICROZIDE) 12.5 MG capsule TAKE 1 CAPSULE EVERY DAY 10/14/21  Yes Juan Miguel Dominique MD   docusate sodium (COLACE) 100 MG capsule Take 100 mg by mouth 2 times daily as needed for Constipation   Yes Historical Provider, MD   Menthol-Methyl Salicylate (MUSCLE RUB) 10-15 % CREA cream as directed    Historical Provider, MD   acetaminophen (TYLENOL) 325 MG tablet Take 650 mg by mouth every 6 hours as needed for Pain  Patient not taking: Reported on 10/14/2021    Historical Provider, MD        Allergies:       Patient has no known allergies. Social History:     Tobacco:    reports that she has never smoked. She has never used smokeless tobacco.  Alcohol:      reports no history of alcohol use. Drug Use:  reports no history of drug use. Family History:     Family History   Problem Relation Age of Onset   [facility] Cancer Mother         Leukemia    Diabetes Father        Review of Systems:       Review of Systems   Constitutional: Negative for chills, fever and weight loss. HENT: Negative for facial swelling. Eyes: Negative for discharge and redness. Respiratory: Negative for cough and shortness of breath. Cardiovascular: Negative for chest pain. Gastrointestinal: Positive for abdominal pain. Negative for constipation, diarrhea, hematochezia, melena, nausea and vomiting. Genitourinary: Negative for hematuria. Skin: Negative for rash. Neurological: Negative for dizziness and facial asymmetry. Physical Exam:     Physical Exam  Vitals reviewed. Constitutional:       General: She is not in acute distress. Appearance: Normal appearance. She is well-developed. She is not ill-appearing. HENT:      Head: Normocephalic and atraumatic.    Eyes:      General:         Right eye: No discharge. Left eye: No discharge. Conjunctiva/sclera: Conjunctivae normal.   Neck:      Thyroid: No thyromegaly. Cardiovascular:      Rate and Rhythm: Normal rate and regular rhythm. Heart sounds: Normal heart sounds. No murmur heard. Pulmonary:      Effort: Pulmonary effort is normal. No respiratory distress. Breath sounds: Normal breath sounds. Abdominal:      General: There is no distension. Palpations: Abdomen is soft. Tenderness: There is no abdominal tenderness. There is no guarding or rebound. Musculoskeletal:      Cervical back: Neck supple. Lymphadenopathy:      Cervical: No cervical adenopathy. Skin:     Findings: No rash. Neurological:      General: No focal deficit present. Mental Status: She is alert and oriented to person, place, and time.    Psychiatric:         Mood and Affect: Mood normal.         Behavior: Behavior normal.         Vitals:  /68   Pulse 76   Ht 5' 7\" (1.702 m)   Wt 178 lb (80.7 kg)   LMP  (LMP Unknown)   SpO2 94%   BMI 27.88 kg/m²       Data:     Lab Results   Component Value Date     09/26/2020    K 3.6 09/26/2020     09/26/2020    CO2 25 09/26/2020    BUN 27 09/26/2020    CREATININE 1.30 09/26/2020    GLUCOSE 99 09/26/2020    GLUCOSE 103 06/06/2012    PROT 6.6 09/26/2020    LABALBU 3.9 09/26/2020    LABALBU 4.1 06/06/2012    BILITOT 0.16 09/26/2020    ALKPHOS 132 09/26/2020    AST 31 09/26/2020    ALT 17 09/26/2020     Lab Results   Component Value Date    WBC 6.9 09/26/2020    RBC 4.30 09/26/2020    RBC 4.73 12/14/2011    HGB 13.4 09/26/2020    HCT 40.2 09/26/2020    MCV 93.3 09/26/2020    MCH 31.1 09/26/2020    MCHC 33.4 09/26/2020    RDW 16.0 09/26/2020     09/26/2020     12/14/2011    MPV NOT REPORTED 09/26/2020     Lab Results   Component Value Date    TSH 2.36 10/25/2012     Lab Results   Component Value Date    CHOL 216 09/12/2016    HDL 56 09/12/2016          Assessment/Plan: 1. Gastroesophageal reflux disease without esophagitis  Pt to INcrease her prilosec 20mg from once and day to BID. Pt also change to decaffienated. Return if symptoms worsen or fail to improve.       Electronically signed by Valerie Robert MD on 2/2/2022 at 4:54 PM

## 2022-02-28 RX ORDER — OMEPRAZOLE 20 MG/1
CAPSULE, DELAYED RELEASE ORAL
Qty: 90 CAPSULE | Refills: 1 | Status: SHIPPED | OUTPATIENT
Start: 2022-02-28 | End: 2022-08-22

## 2022-02-28 RX ORDER — HYDROCHLOROTHIAZIDE 12.5 MG/1
CAPSULE, GELATIN COATED ORAL
Qty: 90 CAPSULE | Refills: 1 | Status: SHIPPED | OUTPATIENT
Start: 2022-02-28 | End: 2022-08-22

## 2022-03-10 ENCOUNTER — TELEPHONE (OUTPATIENT)
Dept: FAMILY MEDICINE CLINIC | Age: 87
End: 2022-03-10

## 2022-03-10 RX ORDER — TRAZODONE HYDROCHLORIDE 50 MG/1
50 TABLET ORAL NIGHTLY
Qty: 30 TABLET | Refills: 5 | Status: SHIPPED | OUTPATIENT
Start: 2022-03-10

## 2022-03-10 NOTE — TELEPHONE ENCOUNTER
Shirley from the [facility] called stating pt was asking for medication to help her sleep. Pt stated she has not been sleeping and that is unusual for her. Please advise.

## 2022-03-10 NOTE — TELEPHONE ENCOUNTER
Please call back to the [facility] and tell them  she may try trazodone 50mg one po QHS for sleep. Please have them make note to call me in 2wks to let me know if helping or not or if need to increase the medication?

## 2022-04-01 ENCOUNTER — TELEPHONE (OUTPATIENT)
Dept: FAMILY MEDICINE CLINIC | Age: 87
End: 2022-04-01

## 2022-04-01 NOTE — TELEPHONE ENCOUNTER
Shirley from the [facility] called asking it pt's trazodone could be changed to PRN? Pt doesn't want to take it every night.  Please advise

## 2022-04-01 NOTE — TELEPHONE ENCOUNTER
Yes ok to please call back to the nurse at the [facility] and tell her that she can change Trazodone 50mg to one QHS PRN for sleep for Doris Villanueva

## 2022-04-14 ENCOUNTER — OFFICE VISIT (OUTPATIENT)
Dept: FAMILY MEDICINE CLINIC | Age: 87
End: 2022-04-14
Payer: MEDICARE

## 2022-04-14 VITALS
WEIGHT: 177 LBS | HEIGHT: 67 IN | HEART RATE: 74 BPM | BODY MASS INDEX: 27.78 KG/M2 | SYSTOLIC BLOOD PRESSURE: 132 MMHG | DIASTOLIC BLOOD PRESSURE: 70 MMHG | OXYGEN SATURATION: 96 %

## 2022-04-14 DIAGNOSIS — K21.9 GASTROESOPHAGEAL REFLUX DISEASE WITHOUT ESOPHAGITIS: ICD-10-CM

## 2022-04-14 DIAGNOSIS — I10 ESSENTIAL HYPERTENSION, BENIGN: ICD-10-CM

## 2022-04-14 DIAGNOSIS — N39.41 URGE INCONTINENCE: ICD-10-CM

## 2022-04-14 DIAGNOSIS — Z00.00 INITIAL MEDICARE ANNUAL WELLNESS VISIT: Primary | ICD-10-CM

## 2022-04-14 PROCEDURE — 4040F PNEUMOC VAC/ADMIN/RCVD: CPT | Performed by: FAMILY MEDICINE

## 2022-04-14 PROCEDURE — 99214 OFFICE O/P EST MOD 30 MIN: CPT | Performed by: FAMILY MEDICINE

## 2022-04-14 PROCEDURE — 1036F TOBACCO NON-USER: CPT | Performed by: FAMILY MEDICINE

## 2022-04-14 PROCEDURE — 1123F ACP DISCUSS/DSCN MKR DOCD: CPT | Performed by: FAMILY MEDICINE

## 2022-04-14 PROCEDURE — G0439 PPPS, SUBSEQ VISIT: HCPCS | Performed by: FAMILY MEDICINE

## 2022-04-14 PROCEDURE — G8417 CALC BMI ABV UP PARAM F/U: HCPCS | Performed by: FAMILY MEDICINE

## 2022-04-14 PROCEDURE — 1090F PRES/ABSN URINE INCON ASSESS: CPT | Performed by: FAMILY MEDICINE

## 2022-04-14 PROCEDURE — G8427 DOCREV CUR MEDS BY ELIG CLIN: HCPCS | Performed by: FAMILY MEDICINE

## 2022-04-14 PROCEDURE — 0509F URINE INCON PLAN DOCD: CPT | Performed by: FAMILY MEDICINE

## 2022-04-14 ASSESSMENT — ENCOUNTER SYMPTOMS
BLOOD IN STOOL: 0
ORTHOPNEA: 0
NAUSEA: 0
VOMITING: 0
EYE DISCHARGE: 0
CHOKING: 0
DIARRHEA: 0
EYE REDNESS: 0
BLURRED VISION: 0
SHORTNESS OF BREATH: 0
CONSTIPATION: 0
COUGH: 0
HEARTBURN: 0
ABDOMINAL PAIN: 0

## 2022-04-14 ASSESSMENT — PATIENT HEALTH QUESTIONNAIRE - PHQ9
2. FEELING DOWN, DEPRESSED OR HOPELESS: 0
SUM OF ALL RESPONSES TO PHQ QUESTIONS 1-9: 0
SUM OF ALL RESPONSES TO PHQ9 QUESTIONS 1 & 2: 0
SUM OF ALL RESPONSES TO PHQ QUESTIONS 1-9: 0
1. LITTLE INTEREST OR PLEASURE IN DOING THINGS: 0

## 2022-04-14 ASSESSMENT — LIFESTYLE VARIABLES: HOW OFTEN DO YOU HAVE A DRINK CONTAINING ALCOHOL: NEVER

## 2022-04-14 NOTE — PROGRESS NOTES
HPI Notes    Name: Virginia Shay  : 1924        Chief Complaint:     Chief Complaint   Patient presents with    Medicare AWV    Hypertension    Gastroesophageal Reflux     22 Pt instructed to increase Prilosec 20mg to BID. Pt states she is feeling much better. Pt states heart burn is gone. History of Present Illness:     Virginia Shay is a 80 y.o.  female who presents with Medicare AWV, Hypertension, and Gastroesophageal Reflux (22 Pt instructed to increase Prilosec 20mg to BID. Pt states she is feeling much better. Pt states heart burn is gone.)      Hypertension  This is a chronic problem. The current episode started more than 1 year ago. The problem is unchanged. The problem is controlled. Pertinent negatives include no blurred vision, chest pain, headaches, orthopnea, palpitations, peripheral edema or shortness of breath. There are no associated agents to hypertension. Risk factors for coronary artery disease include dyslipidemia. The current treatment provides significant improvement. Gastroesophageal Reflux  She reports no abdominal pain, no chest pain, no choking, no coughing, no dysphagia, no heartburn or no nausea. no heartburn since taking protonix BID. This is a chronic problem. The current episode started more than 1 year ago. The problem has been unchanged. Pertinent negatives include no fatigue, melena or weight loss. She has tried a PPI (pt is doing better since she increased her protonix 20mg to one BID) for the symptoms. Bladder incontinence - pt does have urine leakage but stable. No blood in in urine or pain. Pt wears a Depends and is supposed to be changing regularly and using new ones. Pt has to get up a couple times at night but not new for pt and no accidents.      Past Medical History:     Past Medical History:   Diagnosis Date    GERD (gastroesophageal reflux disease)     Hyperlipidemia     Hypertension     Urinary incontinence       Reviewed all health maintenance requirements and ordered appropriate tests  Health Maintenance Due   Topic Date Due    Shingles Vaccine (1 of 2) Never done   [employer] Visit (AWV)  Never done    Potassium monitoring  09/26/2021    Creatinine monitoring  09/26/2021       Past Surgical History:     Past Surgical History:   Procedure Laterality Date    BUNIONECTOMY      CHOLECYSTECTOMY, LAPAROSCOPIC  12/26/12    TONSILLECTOMY          Medications:       Prior to Admission medications    Medication Sig Start Date End Date Taking? Authorizing Provider   traZODone (DESYREL) 50 MG tablet Take 1 tablet by mouth nightly 3/10/22  Yes Alden Riggins MD   omeprazole (PRILOSEC) 20 MG delayed release capsule TAKE 1 CAPSULE EVERY DAY 2/28/22  Yes Alden Riggins MD   hydroCHLOROthiazide (MICROZIDE) 12.5 MG capsule TAKE 1 CAPSULE EVERY DAY 2/28/22  Yes Alden Riggins MD   docusate sodium (COLACE) 100 MG capsule Take 100 mg by mouth 2 times daily as needed for Constipation   Yes Historical Provider, MD   Menthol-Methyl Salicylate (MUSCLE RUB) 10-15 % CREA cream as directed  Patient not taking: Reported on 4/14/2022    Historical Provider, MD   acetaminophen (TYLENOL) 325 MG tablet Take 650 mg by mouth every 6 hours as needed for Pain  Patient not taking: Reported on 10/14/2021    Historical Provider, MD        Allergies:       Patient has no known allergies. Social History:     Tobacco:    reports that she has never smoked. She has never used smokeless tobacco.  Alcohol:      reports no history of alcohol use. Drug Use:  reports no history of drug use. Family History:     Family History   Problem Relation Age of Onset    Cancer Mother         Leukemia    Diabetes Father        Review of Systems:       Review of Systems   Constitutional: Negative for chills, fatigue, fever, unexpected weight change and weight loss. Eyes: Negative for blurred vision, discharge, redness and visual disturbance.    Respiratory: Negative Depends and monitor for infection    4. subsequent Medicare annual wellness visit  Completed        Renuka received counseling on the following healthy behaviors: nutrition and exercise  Reviewed prior labs and health maintenance  Continue current medications, diet and exercise. Discussed use, benefit, and side effects of prescribed medications. Barriers to medication compliance addressed. Patient given educational materials - see patient instructions  Was a self-tracking handout given in paper form or via [employer]? Yes    Requested Prescriptions      No prescriptions requested or ordered in this encounter       All patient questions answered. Patient voiced understanding. Quality Measures    Body mass index is 27.72 kg/m². Elevated. Weight control planned discussed Healthy diet and regular exercise. BP: 132/70. Blood pressure is normal. Treatment plan consists of No treatment change needed. Fall Risk 4/14/2022 10/14/2021 11/5/2020 5/29/2019 5/24/2018 7/18/2017 9/29/2015   2 or more falls in past year? no no no yes no no no   Fall with injury in past year? no no no yes no no no     The patient does not have a history of falls. I did not - not indicated , complete a risk assessment for falls. A plan of care for falls No Treatment plan indicated    Lab Results   Component Value Date    LDLCHOLESTEROL 138 (H) 09/12/2016    (goal LDL reduction with dx if diabetes is 50% LDL reduction)    PHQ Scores 4/14/2022 10/14/2021 4/13/2021 11/5/2020 5/29/2019 5/24/2018 7/18/2017   PHQ2 Score 0 0 0 0 0 0 0   PHQ9 Score 0 0 0 0 0 0 0     Interpretation of Total Score Depression Severity: 1-4 = Minimal depression, 5-9 = Mild depression, 10-14 = Moderate depression, 15-19 = Moderately severe depression, 20-27 = Severe depression        Return in 6 months (on 10/14/2022) for Medicare Annual Wellness Visit in 1 year and 6mos.       Electronically signed by Ashutosh Morales MD on 4/15/2022 at 10:38 AM

## 2022-04-15 NOTE — PATIENT INSTRUCTIONS
Personalized Preventive Plan for Virginia Shay - 4/14/2022  Medicare offers a range of preventive health benefits. Some of the tests and screenings are paid in full while other may be subject to a deductible, co-insurance, and/or copay. Some of these benefits include a comprehensive review of your medical history including lifestyle, illnesses that may run in your family, and various assessments and screenings as appropriate. After reviewing your medical record and screening and assessments performed today your provider may have ordered immunizations, labs, imaging, and/or referrals for you. A list of these orders (if applicable) as well as your Preventive Care list are included within your After Visit Summary for your review. Other Preventive Recommendations:    · A preventive eye exam performed by an eye specialist is recommended every 1-2 years to screen for glaucoma; cataracts, macular degeneration, and other eye disorders. · A preventive dental visit is recommended every 6 months. · Try to get at least 150 minutes of exercise per week or 10,000 steps per day on a pedometer . · Order or download the FREE \"Exercise & Physical Activity: Your Everyday Guide\" from [employer]. Call 1-237.527.3651 or search [employer] online. · You need 5410-9552 mg of calcium and 2152-0304 IU of vitamin D per day. It is possible to meet your calcium requirement with diet alone, but a vitamin D supplement is usually necessary to meet this goal.  · When exposed to the sun, use a sunscreen that protects against both UVA and UVB radiation with an SPF of 30 or greater. Reapply every 2 to 3 hours or after sweating, drying off with a towel, or swimming. · Always wear a seat belt when traveling in a car. Always wear a helmet when riding a bicycle or motorcycle.

## 2022-04-15 NOTE — PROGRESS NOTES
Medicare Annual Wellness Visit    Catina Perez is here for Medicare AWV, Hypertension, and Gastroesophageal Reflux (2/22/22 Pt instructed to increase Prilosec 20mg to BID. Pt states she is feeling much better. Pt states heart burn is gone.)    Assessment & Plan   subsequent Medicare annual wellness visit  Essential hypertension, benign  Gastroesophageal reflux disease without esophagitis  Urge incontinence      Recommendations for Preventive Services Due: see orders and patient instructions/AVS.  Recommended screening schedule for the next 5-10 years is provided to the patient in written form: see Patient Instructions/AVS.     Return in about 6 months (around 10/14/2022). Subjective   The following acute and/or chronic problems were also addressed today: see other note    Patient's complete Health Risk Assessment and screening values have been reviewed and are found in Flowsheets. The following problems were reviewed today and where indicated follow up appointments were made and/or referrals ordered.     Positive Risk Factor Screenings with Interventions:               Hearing/Vision:  Do you or your family notice any trouble with your hearing that hasn't been managed with hearing aids?: (!) Yes  Do you have difficulty driving, watching TV, or doing any of your daily activities because of your eyesight?: No  Have you had an eye exam within the past year?: (!) No  No exam data present    Hearing/Vision Interventions:  · Hearing concerns:  pt wears a hearing aid     ADLs:  In the past 7 days, did you need help from others to perform any of the following everyday activities: Eating, dressing, grooming, bathing, toileting, or walking/balance?: No  In the past 7 days, did you need help from others to take care of any of the following: Laundry, housekeeping, banking/finances, shopping, telephone use, food preparation, transportation, or taking medications?: (!) Yes  Select all that apply: (!) Laundry,Housekeeping,Banking/Finances,Food Preparation,Transportation,Taking Medications    ADL Interventions:  · Patient declines any further evaluation/treatment for this issue  Pt lives at the [facility]        Objective   Vitals:    04/14/22 1538   BP: 132/70   Pulse: 74   SpO2: 96%   Weight: 177 lb (80.3 kg)   Height: 5' 7\" (1.702 m)      Body mass index is 27.72 kg/m². No Known Allergies  Prior to Visit Medications    Medication Sig Taking?  Authorizing Provider   traZODone (DESYREL) 50 MG tablet Take 1 tablet by mouth nightly Yes Gabriel Blue MD   omeprazole (PRILOSEC) 20 MG delayed release capsule TAKE 1 CAPSULE EVERY DAY Yes Gabriel Blue MD   hydroCHLOROthiazide (MICROZIDE) 12.5 MG capsule TAKE 1 CAPSULE EVERY DAY Yes Gabriel Blue MD   docusate sodium (COLACE) 100 MG capsule Take 100 mg by mouth 2 times daily as needed for Constipation Yes Historical Provider, MD   Menthol-Methyl Salicylate (MUSCLE RUB) 10-15 % CREA cream as directed  Patient not taking: Reported on 4/14/2022  Historical Provider, MD   acetaminophen (TYLENOL) 325 MG tablet Take 650 mg by mouth every 6 hours as needed for Pain  Patient not taking: Reported on 10/14/2021  Historical Provider, MD [place] (Including outside providers/suppliers regularly involved in providing care):   Patient Care Team:  Gabriel Blue MD as PCP - Lalit Pettit MD as PCP - [facility] Empaneled Provider    Reviewed and updated this visit:  Tobacco  Allergies  Meds  Problems  Med Hx  Surg Hx  Soc Hx  Fam Hx

## 2022-05-20 ENCOUNTER — APPOINTMENT (OUTPATIENT)
Dept: CT IMAGING | Age: 87
DRG: 605 | End: 2022-05-20
Payer: MEDICARE

## 2022-05-20 ENCOUNTER — HOSPITAL ENCOUNTER (INPATIENT)
Age: 87
LOS: 3 days | Discharge: NURSING, REHAB OR LONG-TERM CARE | DRG: 605 | End: 2022-05-23
Attending: FAMILY MEDICINE | Admitting: INTERNAL MEDICINE
Payer: MEDICARE

## 2022-05-20 DIAGNOSIS — N30.01 ACUTE CYSTITIS WITH HEMATURIA: Primary | ICD-10-CM

## 2022-05-20 DIAGNOSIS — Z20.822 LAB TEST NEGATIVE FOR COVID-19 VIRUS: ICD-10-CM

## 2022-05-20 DIAGNOSIS — R53.1 GENERAL WEAKNESS: ICD-10-CM

## 2022-05-20 DIAGNOSIS — S30.1XXA CONTUSION OF FLANK, INITIAL ENCOUNTER: ICD-10-CM
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
## 2022-05-20 PROBLEM — N39.0 UTI (URINARY TRACT INFECTION): Status: ACTIVE | Noted: 2022-05-20

## 2022-05-20 PROBLEM — N30.00 ACUTE CYSTITIS WITHOUT HEMATURIA: Status: ACTIVE | Noted: 2022-05-20

## 2022-05-20 LAB
-: ABNORMAL
ABSOLUTE EOS #: 0.1 K/UL (ref 0–0.4)
ABSOLUTE LYMPH #: 3.2 K/UL (ref 1–4.8)
ABSOLUTE MONO #: 0.5 K/UL (ref 0–1)
ALBUMIN SERPL-MCNC: 4 G/DL (ref 3.5–5.2)
ALP BLD-CCNC: 145 U/L (ref 35–104)
ALT SERPL-CCNC: 15 U/L (ref 5–33)
ANION GAP SERPL CALCULATED.3IONS-SCNC: 10 MMOL/L (ref 9–17)
AST SERPL-CCNC: 21 U/L
BACTERIA: ABNORMAL
BASOPHILS # BLD: 0 % (ref 0–2)
BASOPHILS ABSOLUTE: 0 K/UL (ref 0–0.2)
BILIRUB SERPL-MCNC: 0.16 MG/DL (ref 0.3–1.2)
BILIRUBIN URINE: NEGATIVE
BUN BLDV-MCNC: 26 MG/DL (ref 8–23)
BUN/CREAT BLD: 21 (ref 9–20)
CALCIUM SERPL-MCNC: 10 MG/DL (ref 8.6–10.4)
CHLORIDE BLD-SCNC: 101 MMOL/L (ref 98–107)
CO2: 28 MMOL/L (ref 20–31)
COLOR: YELLOW
COMMENT UA: ABNORMAL
CREAT SERPL-MCNC: 1.21 MG/DL (ref 0.5–0.9)
DIFFERENTIAL TYPE: YES
EOSINOPHILS RELATIVE PERCENT: 2 % (ref 0–5)
EPITHELIAL CELLS UA: ABNORMAL /HPF
GFR AFRICAN AMERICAN: 50 ML/MIN
GFR NON-AFRICAN AMERICAN: 41 ML/MIN
GFR SERPL CREATININE-BSD FRML MDRD: ABNORMAL ML/MIN/{1.73_M2}
GLUCOSE BLD-MCNC: 104 MG/DL (ref 70–99)
GLUCOSE URINE: NEGATIVE
HCT VFR BLD CALC: 42.6 % (ref 36–46)
HEMOGLOBIN: 14 G/DL (ref 12–16)
KETONES, URINE: NEGATIVE
LEUKOCYTE ESTERASE, URINE: NEGATIVE
LYMPHOCYTES # BLD: 46 % (ref 15–40)
MCH RBC QN AUTO: 30.9 PG (ref 26–34)
MCHC RBC AUTO-ENTMCNC: 32.9 G/DL (ref 31–37)
MCV RBC AUTO: 93.8 FL (ref 80–100)
MONOCYTES # BLD: 6 % (ref 4–8)
NITRITE, URINE: POSITIVE
PDW BLD-RTO: 15.6 % (ref 12.1–15.2)
PH UA: 6 (ref 5–8)
PLATELET # BLD: 197 K/UL (ref 140–450)
POTASSIUM SERPL-SCNC: 4.1 MMOL/L (ref 3.7–5.3)
PROTEIN UA: NEGATIVE
RBC # BLD: 4.54 M/UL (ref 4–5.2)
RBC UA: ABNORMAL /HPF (ref 0–2)
SARS-COV-2, RAPID: NOT DETECTED
SEG NEUTROPHILS: 46 % (ref 47–75)
SEGMENTED NEUTROPHILS ABSOLUTE COUNT: 3.2 K/UL (ref 2.5–7)
SODIUM BLD-SCNC: 139 MMOL/L (ref 135–144)
SPECIFIC GRAVITY UA: 1.01 (ref 1–1.03)
SPECIMEN DESCRIPTION: NORMAL
TOTAL PROTEIN: 6.7 G/DL (ref 6.4–8.3)
TURBIDITY: ABNORMAL
URINE HGB: ABNORMAL
UROBILINOGEN, URINE: NORMAL
WBC # BLD: 7.1 K/UL (ref 3.5–11)
WBC UA: ABNORMAL /HPF

## 2022-05-20 PROCEDURE — 85025 COMPLETE CBC W/AUTO DIFF WBC: CPT

## 2022-05-20 PROCEDURE — 87186 SC STD MICRODIL/AGAR DIL: CPT

## 2022-05-20 PROCEDURE — 99285 EMERGENCY DEPT VISIT HI MDM: CPT

## 2022-05-20 PROCEDURE — 2580000003 HC RX 258: Performed by: INTERNAL MEDICINE

## 2022-05-20 PROCEDURE — 94761 N-INVAS EAR/PLS OXIMETRY MLT: CPT

## 2022-05-20 PROCEDURE — 87086 URINE CULTURE/COLONY COUNT: CPT

## 2022-05-20 PROCEDURE — 80053 COMPREHEN METABOLIC PANEL: CPT

## 2022-05-20 PROCEDURE — 72128 CT CHEST SPINE W/O DYE: CPT

## 2022-05-20 PROCEDURE — 74177 CT ABD & PELVIS W/CONTRAST: CPT

## 2022-05-20 PROCEDURE — 6360000004 HC RX CONTRAST MEDICATION: Performed by: FAMILY MEDICINE

## 2022-05-20 PROCEDURE — 72125 CT NECK SPINE W/O DYE: CPT

## 2022-05-20 PROCEDURE — 81001 URINALYSIS AUTO W/SCOPE: CPT

## 2022-05-20 PROCEDURE — 2580000003 HC RX 258: Performed by: FAMILY MEDICINE

## 2022-05-20 PROCEDURE — 6360000002 HC RX W HCPCS: Performed by: FAMILY MEDICINE

## 2022-05-20 PROCEDURE — 6370000000 HC RX 637 (ALT 250 FOR IP): Performed by: FAMILY MEDICINE

## 2022-05-20 PROCEDURE — 6370000000 HC RX 637 (ALT 250 FOR IP): Performed by: INTERNAL MEDICINE

## 2022-05-20 PROCEDURE — 87077 CULTURE AEROBIC IDENTIFY: CPT

## 2022-05-20 PROCEDURE — 70450 CT HEAD/BRAIN W/O DYE: CPT

## 2022-05-20 PROCEDURE — 1200000000 HC SEMI PRIVATE

## 2022-05-20 PROCEDURE — 87635 SARS-COV-2 COVID-19 AMP PRB: CPT

## 2022-05-20 RX ORDER — SODIUM CHLORIDE 0.9 % (FLUSH) 0.9 %
5-40 SYRINGE (ML) INJECTION PRN
Status: DISCONTINUED | OUTPATIENT
Start: 2022-05-20 | End: 2022-05-23 | Stop reason: HOSPADM

## 2022-05-20 RX ORDER — ACETAMINOPHEN 500 MG
1000 TABLET ORAL ONCE
Status: COMPLETED | OUTPATIENT
Start: 2022-05-20 | End: 2022-05-20

## 2022-05-20 RX ORDER — TRAMADOL HYDROCHLORIDE 50 MG/1
50 TABLET ORAL EVERY 6 HOURS PRN
Status: DISCONTINUED | OUTPATIENT
Start: 2022-05-20 | End: 2022-05-23 | Stop reason: HOSPADM

## 2022-05-20 RX ORDER — SODIUM CHLORIDE 0.9 % (FLUSH) 0.9 %
5-40 SYRINGE (ML) INJECTION EVERY 12 HOURS SCHEDULED
Status: DISCONTINUED | OUTPATIENT
Start: 2022-05-20 | End: 2022-05-23 | Stop reason: HOSPADM

## 2022-05-20 RX ORDER — ENOXAPARIN SODIUM 100 MG/ML
30 INJECTION SUBCUTANEOUS DAILY
Status: DISCONTINUED | OUTPATIENT
Start: 2022-05-21 | End: 2022-05-23 | Stop reason: HOSPADM

## 2022-05-20 RX ORDER — ACETAMINOPHEN 650 MG/1
650 SUPPOSITORY RECTAL EVERY 6 HOURS PRN
Status: DISCONTINUED | OUTPATIENT
Start: 2022-05-20 | End: 2022-05-23 | Stop reason: HOSPADM

## 2022-05-20 RX ORDER — MORPHINE SULFATE 2 MG/ML
2 INJECTION, SOLUTION INTRAMUSCULAR; INTRAVENOUS EVERY 4 HOURS PRN
Status: DISCONTINUED | OUTPATIENT
Start: 2022-05-20 | End: 2022-05-23 | Stop reason: HOSPADM

## 2022-05-20 RX ORDER — POLYETHYLENE GLYCOL 3350 17 G/17G
17 POWDER, FOR SOLUTION ORAL DAILY PRN
Status: DISCONTINUED | OUTPATIENT
Start: 2022-05-20 | End: 2022-05-23 | Stop reason: HOSPADM

## 2022-05-20 RX ORDER — TRAZODONE HYDROCHLORIDE 50 MG/1
50 TABLET ORAL NIGHTLY
Status: DISCONTINUED | OUTPATIENT
Start: 2022-05-20 | End: 2022-05-23 | Stop reason: HOSPADM

## 2022-05-20 RX ORDER — PANTOPRAZOLE SODIUM 40 MG/1
40 TABLET, DELAYED RELEASE ORAL
Status: DISCONTINUED | OUTPATIENT
Start: 2022-05-21 | End: 2022-05-23 | Stop reason: HOSPADM

## 2022-05-20 RX ORDER — SODIUM CHLORIDE 9 MG/ML
INJECTION, SOLUTION INTRAVENOUS PRN
Status: DISCONTINUED | OUTPATIENT
Start: 2022-05-20 | End: 2022-05-23 | Stop reason: HOSPADM

## 2022-05-20 RX ORDER — ONDANSETRON 4 MG/1
4 TABLET, ORALLY DISINTEGRATING ORAL EVERY 8 HOURS PRN
Status: DISCONTINUED | OUTPATIENT
Start: 2022-05-20 | End: 2022-05-23 | Stop reason: HOSPADM

## 2022-05-20 RX ORDER — DOCUSATE SODIUM 100 MG/1
100 CAPSULE, LIQUID FILLED ORAL 2 TIMES DAILY PRN
Status: DISCONTINUED | OUTPATIENT
Start: 2022-05-20 | End: 2022-05-23 | Stop reason: HOSPADM

## 2022-05-20 RX ORDER — ONDANSETRON 2 MG/ML
4 INJECTION INTRAMUSCULAR; INTRAVENOUS EVERY 6 HOURS PRN
Status: DISCONTINUED | OUTPATIENT
Start: 2022-05-20 | End: 2022-05-23 | Stop reason: HOSPADM

## 2022-05-20 RX ORDER — ACETAMINOPHEN 325 MG/1
650 TABLET ORAL EVERY 6 HOURS PRN
Status: DISCONTINUED | OUTPATIENT
Start: 2022-05-20 | End: 2022-05-23 | Stop reason: HOSPADM

## 2022-05-20 RX ORDER — HYDROCHLOROTHIAZIDE 25 MG/1
12.5 TABLET ORAL DAILY
Status: DISCONTINUED | OUTPATIENT
Start: 2022-05-21 | End: 2022-05-23 | Stop reason: HOSPADM

## 2022-05-20 RX ADMIN — SODIUM CHLORIDE, PRESERVATIVE FREE 10 ML: 5 INJECTION INTRAVENOUS at 23:37

## 2022-05-20 RX ADMIN — CEFTRIAXONE SODIUM 1000 MG: 1 INJECTION, POWDER, FOR SOLUTION INTRAMUSCULAR; INTRAVENOUS at 22:30

## 2022-05-20 RX ADMIN — ACETAMINOPHEN 1000 MG: 500 TABLET, FILM COATED ORAL at 20:00

## 2022-05-20 RX ADMIN — TRAZODONE HYDROCHLORIDE 50 MG: 50 TABLET ORAL at 23:50

## 2022-05-20 RX ADMIN — IOPAMIDOL 75 ML: 755 INJECTION, SOLUTION INTRAVENOUS at 18:12

## 2022-05-20 ASSESSMENT — PAIN SCALES - GENERAL
PAINLEVEL_OUTOF10: 4
PAINLEVEL_OUTOF10: 5
PAINLEVEL_OUTOF10: 4
PAINLEVEL_OUTOF10: 5
PAINLEVEL_OUTOF10: 3

## 2022-05-20 ASSESSMENT — PAIN - FUNCTIONAL ASSESSMENT
PAIN_FUNCTIONAL_ASSESSMENT: PREVENTS OR INTERFERES SOME ACTIVE ACTIVITIES AND ADLS
PAIN_FUNCTIONAL_ASSESSMENT: 0-10
PAIN_FUNCTIONAL_ASSESSMENT: 0-10
PAIN_FUNCTIONAL_ASSESSMENT: PREVENTS OR INTERFERES SOME ACTIVE ACTIVITIES AND ADLS

## 2022-05-20 ASSESSMENT — PAIN DESCRIPTION - DESCRIPTORS
DESCRIPTORS: ACHING
DESCRIPTORS: DISCOMFORT

## 2022-05-20 ASSESSMENT — PAIN DESCRIPTION - ONSET: ONSET: GRADUAL

## 2022-05-20 ASSESSMENT — PAIN DESCRIPTION - LOCATION
LOCATION: BACK
LOCATION: HIP
LOCATION: BACK
LOCATION: HIP
LOCATION: HIP

## 2022-05-20 ASSESSMENT — PAIN DESCRIPTION - ORIENTATION
ORIENTATION: LEFT;MID
ORIENTATION: MID
ORIENTATION: LEFT
ORIENTATION: LEFT

## 2022-05-20 ASSESSMENT — PAIN DESCRIPTION - PAIN TYPE
TYPE: ACUTE PAIN
TYPE: ACUTE PAIN

## 2022-05-20 ASSESSMENT — PAIN DESCRIPTION - FREQUENCY: FREQUENCY: INTERMITTENT

## 2022-05-20 ASSESSMENT — LIFESTYLE VARIABLES: HOW OFTEN DO YOU HAVE A DRINK CONTAINING ALCOHOL: NEVER

## 2022-05-21 LAB
ANION GAP SERPL CALCULATED.3IONS-SCNC: 8 MMOL/L (ref 9–17)
BUN BLDV-MCNC: 23 MG/DL (ref 8–23)
BUN/CREAT BLD: 21 (ref 9–20)
CALCIUM SERPL-MCNC: 9.5 MG/DL (ref 8.6–10.4)
CHLORIDE BLD-SCNC: 103 MMOL/L (ref 98–107)
CO2: 27 MMOL/L (ref 20–31)
CREAT SERPL-MCNC: 1.1 MG/DL (ref 0.5–0.9)
GFR AFRICAN AMERICAN: 56 ML/MIN
GFR NON-AFRICAN AMERICAN: 46 ML/MIN
GFR SERPL CREATININE-BSD FRML MDRD: ABNORMAL ML/MIN/{1.73_M2}
GLUCOSE BLD-MCNC: 132 MG/DL (ref 70–99)
POTASSIUM SERPL-SCNC: 3.7 MMOL/L (ref 3.7–5.3)
SODIUM BLD-SCNC: 138 MMOL/L (ref 135–144)

## 2022-05-21 PROCEDURE — 2580000003 HC RX 258: Performed by: INTERNAL MEDICINE

## 2022-05-21 PROCEDURE — 97161 PT EVAL LOW COMPLEX 20 MIN: CPT

## 2022-05-21 PROCEDURE — 97535 SELF CARE MNGMENT TRAINING: CPT

## 2022-05-21 PROCEDURE — 6370000000 HC RX 637 (ALT 250 FOR IP): Performed by: INTERNAL MEDICINE

## 2022-05-21 PROCEDURE — 6360000002 HC RX W HCPCS: Performed by: INTERNAL MEDICINE

## 2022-05-21 PROCEDURE — 36415 COLL VENOUS BLD VENIPUNCTURE: CPT

## 2022-05-21 PROCEDURE — 1200000000 HC SEMI PRIVATE

## 2022-05-21 PROCEDURE — 97165 OT EVAL LOW COMPLEX 30 MIN: CPT

## 2022-05-21 PROCEDURE — 80048 BASIC METABOLIC PNL TOTAL CA: CPT

## 2022-05-21 PROCEDURE — 94761 N-INVAS EAR/PLS OXIMETRY MLT: CPT

## 2022-05-21 RX ADMIN — SODIUM CHLORIDE, PRESERVATIVE FREE 10 ML: 5 INJECTION INTRAVENOUS at 08:21

## 2022-05-21 RX ADMIN — HYDROCHLOROTHIAZIDE 12.5 MG: 25 TABLET ORAL at 08:20

## 2022-05-21 RX ADMIN — TRAZODONE HYDROCHLORIDE 50 MG: 50 TABLET ORAL at 21:22

## 2022-05-21 RX ADMIN — PANTOPRAZOLE SODIUM 40 MG: 40 TABLET, DELAYED RELEASE ORAL at 07:20

## 2022-05-21 RX ADMIN — MORPHINE SULFATE 2 MG: 2 INJECTION, SOLUTION INTRAMUSCULAR; INTRAVENOUS at 01:02

## 2022-05-21 RX ADMIN — SODIUM CHLORIDE, PRESERVATIVE FREE 10 ML: 5 INJECTION INTRAVENOUS at 19:34

## 2022-05-21 RX ADMIN — ENOXAPARIN SODIUM 30 MG: 100 INJECTION SUBCUTANEOUS at 08:20

## 2022-05-21 RX ADMIN — TRAMADOL HYDROCHLORIDE 50 MG: 50 TABLET, COATED ORAL at 00:03

## 2022-05-21 RX ADMIN — CEFTRIAXONE SODIUM 1000 MG: 1 INJECTION, POWDER, FOR SOLUTION INTRAMUSCULAR; INTRAVENOUS at 21:30

## 2022-05-21 RX ADMIN — TRAMADOL HYDROCHLORIDE 50 MG: 50 TABLET, COATED ORAL at 19:33

## 2022-05-21 RX ADMIN — ACETAMINOPHEN 650 MG: 325 TABLET, FILM COATED ORAL at 21:22

## 2022-05-21 RX ADMIN — ACETAMINOPHEN 650 MG: 325 TABLET, FILM COATED ORAL at 03:17

## 2022-05-21 ASSESSMENT — PAIN DESCRIPTION - ORIENTATION
ORIENTATION: LEFT;MID

## 2022-05-21 ASSESSMENT — PAIN SCALES - GENERAL
PAINLEVEL_OUTOF10: 8
PAINLEVEL_OUTOF10: 0
PAINLEVEL_OUTOF10: 5
PAINLEVEL_OUTOF10: 8
PAINLEVEL_OUTOF10: 8
PAINLEVEL_OUTOF10: 7

## 2022-05-21 ASSESSMENT — PAIN DESCRIPTION - DESCRIPTORS
DESCRIPTORS: ACHING
DESCRIPTORS: ACHING
DESCRIPTORS: OTHER (COMMENT)

## 2022-05-21 ASSESSMENT — PAIN DESCRIPTION - ONSET: ONSET: ON-GOING

## 2022-05-21 ASSESSMENT — PAIN DESCRIPTION - LOCATION
LOCATION: BACK

## 2022-05-21 ASSESSMENT — PAIN - FUNCTIONAL ASSESSMENT: PAIN_FUNCTIONAL_ASSESSMENT: PREVENTS OR INTERFERES SOME ACTIVE ACTIVITIES AND ADLS

## 2022-05-21 ASSESSMENT — PAIN DESCRIPTION - FREQUENCY: FREQUENCY: INTERMITTENT

## 2022-05-21 ASSESSMENT — PAIN DESCRIPTION - PAIN TYPE: TYPE: ACUTE PAIN

## 2022-05-21 NOTE — PROGRESS NOTES
[facility]  Occupational Therapy  Evaluation  Date: 2022  Patient Name: Kahlil Lucia        MRN: [id number]    : 1924  (80 y.o.)  Gender: female   Referring Practitioner: Dr. James Taylor  Diagnosis: L hip/leg pain s/p fall/ UTI  Additional Pertinent Hx: Pt is a 81 yo female who lives in independent living at the [facility] & was ambulating to the bathroom, when she fell over transition strip from carpet to BR floor, and is now complaining of left leg and hip pain. CT results reveal no fxs. Found to have UTI upon admission. Refered to OT to assess ability to complete self care tasks.   Past Medical History:   Diagnosis Date    GERD (gastroesophageal reflux disease)     Hyperlipidemia     Hypertension     Urinary incontinence      Past Surgical History:   Procedure Laterality Date    BUNIONECTOMY      CHOLECYSTECTOMY, LAPAROSCOPIC  12    TONSILLECTOMY                  Subjective  Subjective: Pt in chair upon arrival and agreeable to OT evaluation  Pain Level: 0  Pain Location: Back  Pain Orientation: Left,Mid     Hearing  Hearing: Exceptions to [facility]  Hearing Exceptions: Hard of hearing/hearing concerns,Right hearing aid  Social/Functional History  Lives With: Alone  Type of Home:  (independent living at [facility])  [place] Toilet: Handicap height  Bathroom Equipment: Built-in shower seat,Grab bars in shower,Grab bars around toilet  Bathroom Accessibility: Accessible  Home Equipment: Walker, [address] Help From: Other (comment) (facility completes all laundry, cleaning & cooking tasks)  ADL Assistance:  (states the only thing she needs help with are her KARAN hoses & socks)  Homemaking Assistance:  (facility completed all laundry, cleaning, & cooking for pt)  Homemaking Responsibilities: No  Ambulation Assistance: Independent  Transfer Assistance: Independent  Active : No  Prior Function  Receives Help From: Other (comment) (facility completes all laundry, cleaning & cooking tasks)  ADL Assistance:  (states the only thing she needs help with are her KARAN hoses & socks)  Homemaking Assistance:  (facility completed all laundry, cleaning, & cooking for pt)  Ambulation Assistance: Independent  Transfer Assistance: Independent    Objective   ADL  Feeding: Setup  Grooming: Setup,Independent  UE Bathing: Setup,Supervision  LE Bathing: Setup,Supervision  UE Dressing: Setup,Supervision  LE Dressing: Supervision,Setup  Toileting: Supervision,Setup             Assessment: Eval completed. Pt in chair upon arrival and agreeable to evaluation. Pt completes all functional mobility & transfers via FWW & SBA/Supervision. Pt ambulates long distances from nurses station to surgical waiting area via [address] & SBA/Supervision w/o LOB or verbal prompts for safety. Completes toileting tasks in room w/ SBA/supervision. CARMINE UB ROM & MMT WFL with some weakenss typical 81 yo. Pt left in chair w/ call ligh tin reach, alarms in place and all needs met. Pt appears to be at baselien for functional mobility & self care tasks. No acute OT needs identified at this time. Recommend d/c back to independent living when medically stable.      Goals  Short Term Goals  Time Frame for Short term goals: N/A  Long Term Goals  Time Frame for Long term goals : N/A    Plan  No Skilled OT: At baseline function,No OT goals identified    Times per Week: n/a            Time In: 620  Time Out: 855  Timed Coded Minutes: 10  Total Treatment Time: 32    KENYA Montiel, OTR/L  5/21/2022

## 2022-05-21 NOTE — ED NOTES
Called Vahe Hollis and reported that patient will be kept under observation and is positive for UTI     Anna Proctor RN  05/20/22 8099

## 2022-05-21 NOTE — PROGRESS NOTES
Pt remains awake and tells writer that she has not been asleep and she can rate her pain now. Pt rates pain 8/10 scale. Pt is agreeable to 2 mg of Morphine IVPS. 0210-Pt resting with closed eyes and easy resp.    0315-Pt awake and asks to get up to the BR. Ambulates with walker to BR and then back to bed. Pt also requesting 650 mg of Tylenol and relates that the pain is still an \"8\". No swallowing deficits noted.

## 2022-05-21 NOTE — PROGRESS NOTES
Patient returns to room without incident. Resting in recliner with tab alarm intact and bedside table and call light within reach.

## 2022-05-21 NOTE — PROGRESS NOTES
Patient's son, Adalgisa Sanders, brings in left hearing aid and glasses. Patient now is wearing both hearing aids.

## 2022-05-21 NOTE — PROGRESS NOTES
Due to severe weather alert, patient assisted to cardiopulmonary department per protocol. Patient resting comfortably in recliner in cardiopulmonary department at this time.

## 2022-05-21 NOTE — ED PROVIDER NOTES
[facility]  eMERGENCY dEPARTMENT eNCOUnter          CHIEF COMPLAINT       Chief Complaint   Patient presents with    Leg Pain     Patient lives in assisted living, was ambulating to the bathroom, when she fell, and is now complaining of left leg and hip pain. Nurses Notes reviewed and I agree except as noted in the HPI. HISTORY OF PRESENT ILLNESS    Jesusita Cha is a 80 y.o. female who presents to the emergency room via EMS from [facility], patient had a reported fall in the bathroom, with patient only being down a few minutes prior to staff getting to her, patient is unsure but does not think she struck her head, however patient is having a lot of pain indicating her left flank area. Patient rates her discomfort 4 out of 10, aching, worse with some movements. REVIEW OF SYSTEMS     Review of Systems   All other systems reviewed and are negative. PAST MEDICAL HISTORY    has a past medical history of GERD (gastroesophageal reflux disease), Hyperlipidemia, Hypertension, and Urinary incontinence. SURGICAL HISTORY      has a past surgical history that includes Bunionectomy; Tonsillectomy; and Cholecystectomy, laparoscopic (12/26/12).     CURRENT MEDICATIONS       Current Discharge Medication List      CONTINUE these medications which have NOT CHANGED    Details   traZODone (DESYREL) 50 MG tablet Take 1 tablet by mouth nightly  Qty: 30 tablet, Refills: 5      omeprazole (PRILOSEC) 20 MG delayed release capsule TAKE 1 CAPSULE EVERY DAY  Qty: 90 capsule, Refills: 1      hydroCHLOROthiazide (MICROZIDE) 12.5 MG capsule TAKE 1 CAPSULE EVERY DAY  Qty: 90 capsule, Refills: 1      Menthol-Methyl Salicylate (MUSCLE RUB) 10-15 % CREA cream as directed      docusate sodium (COLACE) 100 MG capsule Take 100 mg by mouth 2 times daily as needed for Constipation      acetaminophen (TYLENOL) 325 MG tablet Take 650 mg by mouth every 6 hours as needed for Pain             ALLERGIES     has No Known Allergies. FAMILY HISTORY     She indicated that her mother is . She indicated that her father is . family history includes Cancer in her mother; Diabetes in her father. SOCIAL HISTORY      reports that she has never smoked. She has never used smokeless tobacco. She reports that she does not drink alcohol and does not use drugs. PHYSICAL EXAM     INITIAL VITALS:  height is 5' 7\" (1.702 m) and weight is 182 lb 11.2 oz (82.9 kg). Her oral temperature is 97.6 °F (36.4 °C). Her blood pressure is 153/78 (abnormal) and her pulse is 63. Her respiration is 18 and oxygen saturation is 96%. Physical Exam   Constitutional: Patient is oriented to person, place, and time. Patient appears well-developed and well-nourished. Patient is active and cooperative. HENT:   Head: Normocephalic and atraumatic. Head is without contusion. Right Ear: [place],  external ear normal. No drainage. Earring aided noted right ear. Negative thayer sign, negative hemotympanum  Left Ear: [place], external ear normal. No drainage. Negative thayer sign, negative hemotympanum  Nose: Nose normal. No nasal deformity. No epistaxis. Mouth/Throat: Mucous membranes are not dry. Eyes: EOMI. Conjunctivae, sclera, and lids are normal. Right eye exhibits no discharge. Left eye exhibits no discharge. Neck: Full passive range of motion without pain and phonation normal.   Cardiovascular:  Normal rate, regular rhythm and intact distal pulses. Pulses: Right radial pulse  2+   Pulmonary/Chest: Effort normal. No tachypnea and no bradypnea. No wheezes, rhonchi, or rales. No subcutaneous emphysema or crepitus on palpation of the left lateral ribs, however there is early contusion over the left posterior lower ribs extending into the retroperitoneal space  Abdominal: Soft. Patient without distension or tenderness, no rigidity, rebound, or gaurding.    Musculoskeletal:   Except as otherwise noted, negative acute trauma or deformity,  apparent full range of motion and normal strength all extremities appropriate to age. No shortening or rotation of the left lower extremity as compared to contralateral, no pain to palpation of the left iliac crest, patient able to flex at the hip as well as rotate with expected range of motion without discomfort, distal CSM intact  Neurological: Patient is alert and oriented to person, place, and time. patient displays no tremor. Patient displays no seizure activity. .  Skin: Skin is warm and dry. Patient is not diaphoretic. Psychiatric: Patient has a normal mood and pleasant affect. Patient speech is normal and behavior is normal. Cognition and memory are normal.    DIFFERENTIAL DIAGNOSIS:   ICH, cervical injury/spasm, rib fracture, retroperitoneal hematoma, contusion UTI    DIAGNOSTIC RESULTS           RADIOLOGY: non-plain film images(s) such as CT, Ultrasound and MRI are read by the radiologist.  CT Cervical Spine WO Contrast   Final Result         1. No acute bone abnormality. 2. Multilevel marked degenerative changes. CT ABDOMEN PELVIS W IV CONTRAST Additional Contrast? None   Final Result      1. No solid or hollow organ injury. No free air or free fluid. 2. No fracture. Multilevel marked degenerative changes of the lumbar spine. CT THORACIC SPINE WO CONTRAST   Final Result         1. No acute bone abnormality. 2. Multilevel moderate degenerative changes. CT Head WO Contrast   Final Result      1. No intracranial hemorrhage or appreciable acute abnormality. 2. Age consistent chronic changes and stable calcium deposition within right    basal ganglia. 3. No fracture of the calvarium or scalp hematoma.               LABS:   Labs Reviewed   CBC WITH AUTO DIFFERENTIAL - Abnormal; Notable for the following components:       Result Value    RDW 15.6 (*)     Seg Neutrophils 46 (*)     Lymphocytes 46 (*)     All other components within normal limits COMPREHENSIVE METABOLIC PANEL W/ REFLEX TO MG FOR LOW K - Abnormal; Notable for the following components:    Glucose 104 (*)     BUN 26 (*)     CREATININE 1.21 (*)     Bun/Cre Ratio 21 (*)     Alkaline Phosphatase 145 (*)     Total Bilirubin 0.16 (*)     GFR Non- 41 (*)     GFR  50 (*)     All other components within normal limits   URINALYSIS - Abnormal; Notable for the following components:    Turbidity UA Hazy (*)     Urine Hgb TRACE (*)     Nitrite, Urine POSITIVE (*)     All other components within normal limits   MICROSCOPIC URINALYSIS - Abnormal; Notable for the following components:    Bacteria, UA RARE (*)     All other components within normal limits   COVID-19, RAPID   CULTURE, URINE   URINALYSIS WITH MICROSCOPIC   BASIC METABOLIC PANEL W/ REFLEX TO MG FOR LOW K       EMERGENCY DEPARTMENT COURSE:   Vitals:    Vitals:    05/20/22 1930 05/20/22 1945 05/20/22 2000 05/20/22 2315   BP: (!) 168/81 (!) 166/87 (!) 157/82 (!) 153/78   Pulse:    63   Resp:    18   Temp:    97.6 °F (36.4 °C)   TempSrc:    Oral   SpO2: 96% 94% 93% 96%   Weight:    182 lb 11.2 oz (82.9 kg)   Height:    5' 7\" (1.702 m)     Patient history and physical exam taken at bedside, discussed patient symptoms and exam findings, discussed initial work-up to include CT head and cervical spine noncontrast, CT thoracic noncontrast CT abdomen pelvis with contrast, would like to get urine from patient, will get basic blood work. Initial blood work-up reviewed    Patient returned from imaging, is resting supine in bed with c-collar remaining, advised patient I am waiting from the formal read on the cervical spine before removing c-collar, acknowledged.     Patient's son had arrived shortly after patient, and he was kept  up-to-date throughout patient's stay    After extended time to post, CT radiology reports reviewed    C-Collar removed    Discussed with patient and patient son imaging findings, I do note in the (has no administration in time range)   ondansetron (ZOFRAN-ODT) disintegrating tablet 4 mg (has no administration in time range)     Or   ondansetron (ZOFRAN) injection 4 mg (has no administration in time range)   polyethylene glycol (GLYCOLAX) packet 17 g (has no administration in time range)   acetaminophen (TYLENOL) tablet 650 mg (650 mg Oral Given 5/21/22 0317)     Or   acetaminophen (TYLENOL) suppository 650 mg ( Rectal See Alternative 5/21/22 0317)   morphine (PF) injection 2 mg (2 mg IntraVENous Given 5/21/22 0102)   traMADol (ULTRAM) tablet 50 mg (50 mg Oral Given 5/21/22 0003)   cefTRIAXone (ROCEPHIN) 1000 mg IVPB in 50 mL D5W minibag (has no administration in time range)   iopamidol (ISOVUE-370) 76 % injection 75 mL (75 mLs IntraVENous Given 5/20/22 1812)   acetaminophen (TYLENOL) tablet 1,000 mg (1,000 mg Oral Given 5/20/22 2000)   cefTRIAXone (ROCEPHIN) 1000 mg IVPB in 50 mL D5W minibag (0 mg IntraVENous Stopped 5/20/22 2300)       New Prescriptions from this visit:    Current Discharge Medication List          Follow-up:  No follow-up provider specified. Final Impression:   1. Acute cystitis with hematuria    2. Contusion of flank, initial encounter    3. Accidental fall, initial encounter    4. General weakness    5.  Lab test negative for COVID-19 virus               (Please note that portions of this note were completed with a voice recognition program.  Efforts were made to edit the dictations but occasionally words are mis-transcribed.)    MD Herve Peterson MD  05/21/22 7416

## 2022-05-21 NOTE — PROGRESS NOTES
Pt to room 258 per WC. Ambulates to BR and then back to bed without incident. Pt voids malodorous urine and is also incontinent of the same. Back to bed and pt requesting to keep her personal clothes on as she gets very cold with only a gown. Small ecchymosis noted to left buttock and left flank area in which she states, \"It just hurts. \" Pt offered 50 mg of Tramadol. Trazadone 50 mg provided as well.

## 2022-05-21 NOTE — H&P
History & Physical    Patient:  Trever Gonzales  YOB: 1924  Date of Service: 5/21/2022  MRN: [id number]   Acct:   [de-identified]   Primary Care Physician: Silvia Harmon MD    Chief Complaint:   Chief Complaint   Patient presents with    Leg Pain     Patient lives in assisted living, was ambulating to the bathroom, when she fell, and is now complaining of left leg and hip pain. History of Present Illness: The patient is a 80 y.o. female presented to the emergency room by EMS from [facility] living for evaluation of severe pain in the left lower back/flank. Patient states that she was going to the bathroom with her walker and lost her balance and fell on her left side. She does not remember hitting her head. She believes that the handle of the walker hit her left lower back. She developed severe sharp pains in the left flank, constant, worse with movements. She was unable to get up and started hollering for help. Staff came to assist her and called EMS to transfer her to ER for evaluation. Patient reports no other symptoms such as headache, dizziness, chest pain, cough, shortness of breath, nausea/vomiting. Work-up in the emergency room revealed stable vitals. Labs revealed sodium 139, potassium 4.1, BUN 26, creatinine 1.2, glucose 104, calcium 10. LFTs unremarkable except elevated alk phos 145. CBC essentially normal.  UA revealed positive nitrates, negative leukocyte esterase, 2-5 WBCs and rare bacteria. Patient had multiple imaging studies done including CT head without contrast which was negative for acute abnormalities. CT cervical spine and thoracic spine without contrast revealing no acute bony abnormalities, multilevel moderate degenerative changes. CT abdomen pelvis revealed no solid or hollow organ injury, no free air or free fluid, no fractures, multilevel marked degenerative changes of the lumbar spine.   Due to intractable pain in her lower back, UTI and also high risk for recurrent falls the patient was deemed appropriate for admission. Past Medical History:        Diagnosis Date    GERD (gastroesophageal reflux disease)     Hyperlipidemia     Hypertension     Urinary incontinence        Past Surgical History:        Procedure Laterality Date    BUNIONECTOMY      CHOLECYSTECTOMY, LAPAROSCOPIC  12/26/12    TONSILLECTOMY         Home Medications:   No current facility-administered medications on file prior to encounter. Current Outpatient Medications on File Prior to Encounter   Medication Sig Dispense Refill    traZODone (DESYREL) 50 MG tablet Take 1 tablet by mouth nightly (Patient not taking: Reported on 5/20/2022) 30 tablet 5    omeprazole (PRILOSEC) 20 MG delayed release capsule TAKE 1 CAPSULE EVERY DAY 90 capsule 1    hydroCHLOROthiazide (MICROZIDE) 12.5 MG capsule TAKE 1 CAPSULE EVERY DAY 90 capsule 1    Menthol-Methyl Salicylate (MUSCLE RUB) 10-15 % CREA cream as directed (Patient not taking: Reported on 4/14/2022)      docusate sodium (COLACE) 100 MG capsule Take 100 mg by mouth 2 times daily as needed for Constipation      acetaminophen (TYLENOL) 325 MG tablet Take 650 mg by mouth every 6 hours as needed for Pain (Patient not taking: Reported on 10/14/2021)         Allergies:  Patient has no known allergies. Social History:    reports that she has never smoked. She has never used smokeless tobacco. She reports that she does not drink alcohol and does not use drugs. Family History:       Problem Relation Age of Onset    Cancer Mother         Leukemia    Diabetes Father        Review of systems:  Constitutional: no fever, no night sweats, positive for generalized weakness  Head: no headache, no head injury, no migranes. Eye: no blurring of vision, no double vision.   Ears: Positive for hearing difficulty, no tinnitus  Mouth/throat: no sore throat, no dysphagia  Lungs: no cough, no shortness of breath, no wheeze  CVS: no palpitation, no chest pain, no shortness of breath  GI: no abdominal pain, no nausea , no vomiting, no constipation  [place]: no dysuria, frequency and urgency, positive history of UTI in the past  Musculoskeletal: Positive for left sided flank/back pain  Endocrine: no polyuria, polydypsia, no cold or heat intolerence  Hematology: no anemia, no easy brusing or bleeding  Dermatology: no skin rash  Psychiatry: no depression, no anxiety  Neurology: no syncope, no seizures, no numbness or tingling of hands, no numbness or tingling of feet, no paresis      Vitals:   Vitals:    05/21/22 0922   BP: 107/57   Pulse: 71   Resp: 18   Temp: 97.8   SpO2: 92%      BMI: Body mass index is 28.61 kg/m².     Physical Exam:  General Appearance: Hard of hearing, alert and oriented to person, place and time, in no acute distress  Cardiovascular: normal rate, regular rhythm, normal S1 and S2, 2/6 systolic ejection murmur  Pulmonary/Chest: clear to auscultation bilaterally- no wheezes, rales or rhonchi, normal air movement, no respiratory distress  Abdomen: soft, non-tender, non-distended, normal bowel sounds   Extremities: no cyanosis, clubbing or edema  Skin: warm and dry, no rash   Head: normocephalic and atraumatic  Eyes: pupils equal, round, and reactive to light  Neck: supple and non-tender without mass, no thyromegaly   Musculoskeletal: normal range of motion, tenderness over the left thoracic spine area, patient also noted to have bruises of the left thoracic spine area  Neurological: alert, oriented, normal speech, no focal findings or movement disorder noted    Review of Labs and Diagnostic Testing:    Recent Results (from the past 24 hour(s))   CBC with Auto Differential    Collection Time: 05/20/22  4:58 PM   Result Value Ref Range    WBC 7.1 3.5 - 11.0 k/uL    RBC 4.54 4.0 - 5.2 m/uL    Hemoglobin 14.0 12.0 - 16.0 g/dL    Hematocrit 42.6 36 - 46 %    MCV 93.8 80 - 100 fL    MCH 30.9 26 - 34 pg    MCHC 32.9 31 - 37 g/dL    RDW 15.6 (H) 12.1 - 15.2 %    Platelets 130 342 - 182 k/uL    Differential Type YES     Seg Neutrophils 46 (L) 47 - 75 %    Lymphocytes 46 (H) 15 - 40 %    Monocytes 6 4 - 8 %    Eosinophils % 2 0 - 5 %    Basophils 0 0 - 2 %    Segs Absolute 3.20 2.5 - 7.0 k/uL    Absolute Lymph # 3.20 1.0 - 4.8 k/uL    Absolute Mono # 0.50 0.0 - 1.0 k/uL    Absolute Eos # 0.10 0.0 - 0.4 k/uL    Basophils Absolute 0.00 0.0 - 0.2 k/uL   Comprehensive Metabolic Panel w/ Reflex to MG    Collection Time: 05/20/22  4:58 PM   Result Value Ref Range    Glucose 104 (H) 70 - 99 mg/dL    BUN 26 (H) 8 - 23 mg/dL    CREATININE 1.21 (H) 0.50 - 0.90 mg/dL    Bun/Cre Ratio 21 (H) 9 - 20    Calcium 10.0 8.6 - 10.4 mg/dL    Sodium 139 135 - 144 mmol/L    Potassium 4.1 3.7 - 5.3 mmol/L    Chloride 101 98 - 107 mmol/L    CO2 28 20 - 31 mmol/L    Anion Gap 10 9 - 17 mmol/L    Alkaline Phosphatase 145 (H) 35 - 104 U/L    ALT 15 5 - 33 U/L    AST 21 <32 U/L    Total Bilirubin 0.16 (L) 0.30 - 1.20 mg/dL    Total Protein 6.7 6.4 - 8.3 g/dL    Albumin 4.0 3.5 - 5.2 g/dL    GFR Non- 41 (L) >60 mL/min    GFR African American 50 (L) >60 mL/min    GFR Comment         Urinalysis    Collection Time: 05/20/22  7:27 PM   Result Value Ref Range    Color, UA Yellow Yellow    Turbidity UA Hazy (A) Clear    Glucose, Ur NEGATIVE NEGATIVE    Bilirubin Urine NEGATIVE NEGATIVE    Ketones, Urine NEGATIVE NEGATIVE    Specific Gravity, UA 1.010 1.005 - 1.030    Urine Hgb TRACE (A) NEGATIVE    pH, UA 6.0 5.0 - 8.0    Protein, UA NEGATIVE NEGATIVE    Urobilinogen, Urine Normal Normal    Nitrite, Urine POSITIVE (A) NEGATIVE    Leukocyte Esterase, Urine NEGATIVE NEGATIVE    Urinalysis Comments         Microscopic Urinalysis    Collection Time: 05/20/22  7:27 PM   Result Value Ref Range    -          WBC, UA 2 TO 5 0 /HPF    RBC, UA 5 TO 10 0 - 2 /HPF    Epithelial Cells UA 0 TO 2 /HPF    Bacteria, UA RARE (A) None   COVID-19, Rapid    Collection Time: 05/20/22  9:46 PM    Specimen: Nasopharyngeal Swab   Result Value Ref Range    Specimen Description . NASOPHARYNGEAL SWAB     SARS-CoV-2, Rapid Not Detected Not Detected   Basic Metabolic Panel w/ Reflex to MG    Collection Time: 05/21/22  5:13 AM   Result Value Ref Range    Glucose 132 (H) 70 - 99 mg/dL    BUN 23 8 - 23 mg/dL    CREATININE 1.10 (H) 0.50 - 0.90 mg/dL    Bun/Cre Ratio 21 (H) 9 - 20    Calcium 9.5 8.6 - 10.4 mg/dL    Sodium 138 135 - 144 mmol/L    Potassium 3.7 3.7 - 5.3 mmol/L    Chloride 103 98 - 107 mmol/L    CO2 27 20 - 31 mmol/L    Anion Gap 8 (L) 9 - 17 mmol/L    GFR Non-African American 46 (L) >60 mL/min    GFR  56 (L) >60 mL/min    GFR Comment             Radiology:     CT Head WO Contrast    Result Date: 5/20/2022  EXAMINATION: CT HEAD WO CONTRAST HISTORY: Reason for exam:->fall, unknown if head strike COMPARISON: CT head 2/15/2019 TECHNIQUE: Axial CT images were obtained without IV contrast. Dose reduction techniques were achieved by using automated exposure control and/or adjustment of mA and/or kV according to patient size and/or use of iterative reconstruction technique. FINDINGS: BRAIN: Stable areas of calcium deposition within the right cerebellum. No edema, hemorrhage, mass, acute infarction, or inappropriate atrophy. CSF SPACES: No hydrocephalus, subarachnoid hemorrhage, or mass. Appropriate for age. SKULL: No fracture, mass, or other significant visible lesion. SINUSES: No significant mucosal thickening or fluid on the limited views. ORBITS: No appreciable abnormality on the limited views. OTHER: Negative     1. No intracranial hemorrhage or appreciable acute abnormality. 2. Age consistent chronic changes and stable calcium deposition within right basal ganglia. 3. No fracture of the calvarium or scalp hematoma.      CT Cervical Spine WO Contrast    Result Date: 5/20/2022  EXAMINATION: CT CERVICAL SPINE WO CONTRAST HISTORY: Reason for exam:->fall, unknown if head strike COMPARISON: CT cervical spine 2/15/2019 TECHNIQUE: Axial, Coronal, and Sagittal images were created without IV contrast. Dose reduction techniques were achieved by using automated exposure control and/or adjustment of mA and/or kV according to patient size and/or use of iterative reconstruction technique. FINDINGS: VERTEBRAL BODIES: No fracture or spondylolisthesis. FACET JOINTS: Multilevel moderate marked degenerative facet arthropathy, right greater than left. No disruption or abnormal joint space widening. CERVICAL DISCS: Marked narrowing at all cervical levels. CENTRAL CANAL: Multilevel marked central canal and bilateral foramen narrowing secondary to degenerative disc disease and facet arthropathy. PARASPINAL AREA: No visible mass. 1. No acute bone abnormality. 2. Multilevel marked degenerative changes. CT THORACIC SPINE WO CONTRAST    Result Date: 5/20/2022  PROCEDURE: CT THORACIC SPINE WO CONTRAST HISTORY: Reason for exam:->fall, struck and with pain left retroperitoneal area, left lateral lower ribs COMPARISON: None. TECHNIQUE: Axial, Coronal, and Sagittal CT images obtained without IV contrast. Dose reduction techniques were achieved by using automated exposure control and/or adjustment of mA and/or kV according to patient size and/or use of iterative reconstruction technique. FINDINGS: PARASPINAL AREA: Normal with no visible mass. DISCS: Moderate marked degenerative disc disease of the visible lower cervical spine. Multilevel moderate disc space narrowing of the thoracic spine. BONES: No fracture or spondylolisthesis. Multilevel degenerative changes. Incidental large hemangioma within T10. OTHER: Negative. 1. No acute bone abnormality. 2. Multilevel moderate degenerative changes.      CT ABDOMEN PELVIS W IV CONTRAST Additional Contrast? None    Result Date: 5/20/2022  EXAMINATION: CT ABDOMEN PELVIS W IV CONTRAST HISTORY: Additional Contrast?->None ; left flank and lower lateral rib pain after falling COMPARISON: No relevant comparison available. TECHNIQUE: CT images were created with IV contrast. Axial, Coronal, and Sagittal images. Dose reduction techniques were achieved by using automated exposure control and/or adjustment of mA and/or kV according to patient size and/or use of iterative reconstruction technique. FINDINGS: LUNG BASES: No visible pulmonary or pleural disease. LIVER: No enlargement, atrophy, abnormal density, or significant focal lesion. BILIARY: Cholecystectomy. PANCREAS: No lesion, fluid collection, ductal dilatation, or atrophy. SPLEEN: No enlargement or focal lesion. ADRENALS: No mass or enlargement. KIDNEYS: Multiple small cysts within right kidney and a single large 7.4 cm cyst. Left renal cyst 2.8 cm. No appreciable stones, mass, or obstructive uropathy. BOWEL/MESENTERY: Marked diverticulosis of the descending and sigmoid colon without acute inflammatory changes. No visible mass, obstruction, or bowel wall thickening. Normal appendix. Moderate hiatal hernia. Unremarkable small bowel. AORTA/VASCULAR: No aneurysm or dissection. RETROPERITONEUM: No mass or adenopathy. LYMPH NODES: No adenopathy. URINARY BLADDER: No visible focal wall thickening, lesion, or calculus. PELVIC ORGANS: No visible mass. Pelvic organs appropriate for patient age. ABDOMINAL WALL: No mass or hernia. BONES: No bony lesion or fracture. Marked degenerative disc disease at all lumbar levels. OTHER: Negative. 1. No solid or hollow organ injury. No free air or free fluid. 2. No fracture. Multilevel marked degenerative changes of the lumbar spine. Assessment/ Plan:    1.  UTI -patient is on IV Rocephin, started on 5/20, urine cultures pending  2. Intractable left flank pain secondary to contusion - on prn IV morphine, oral tramadol, pain controlled  3. S/p accidental fall, initial encounter -consult PT and OT  4. Generalized weakness -consult  for possible ECF arrangements, PT and OT for evaluation  5.   Hypertension -blood pressure stable continue on HCTZ  6. GERD - symptoms controlled, on Protonix  7. CKD stage 3 - appears to be at baseline    CODE STATUS  [facility]   (x )  I confirmed that the patient's Advanced Care Plan is present, code status documented, or surrogate decision maker is listed in the patient's medical record. ( )  The patient's advanced care plan is not present because:  (select)   ( ) I confirmed today that the patient does not wish or was not able to name a surrogate decision maker or provide an [address] ( ) Hospice care is currently being provided or has been provided this calender year. ( )  I did not confirm today the presence of an [address] or surrogate decision maker documented within the patient's medical record. (Does not satisfy MIPS performance). Documentation of Current Medications in the Medical Record    ( x)  I have utilized all available immediate resources to obtain, update, or review the patient's current medications. If Yes, Stop Here  ( ) The patient is not eligible for medications reconciliation; the patient is in an emergent medical situation where delaying treatment would jeopardize the patient's health. ( ) I did not confirm, update or review the patient's current list of medications today. (does not satisfy MIPS performance)        Medical Necessity: Inpatient admission is appropriate for this patient secondary to the need of IV antibiotics, IV morphine for pain control, PT and OT evaluation, social service consult for ECF placement    Estimated length of stay: 2 days. The beneficiary may reasonably be expected to be discharged or transferred to a hospital within 96 hours after admission.     DVT prophylaxis:   [x] Lovenox   [] SCDs   [] SQ Heparin   [] Encourage ambulation, low risk for DVT, no chemical or mechanical    prophylaxis necessary      [] Already on Anticoagulation    Anticipated Disposition upon discharge:   [] Home   [] Home with Home Health   [x] Isaac Badillo   [] [address] ,[address]      Electronically signed by Jada Lundberg MD on 5/21/2022 at 11:00 AM

## 2022-05-21 NOTE — PROGRESS NOTES
[facility]  Physical Therapy  Evaluation  Date: 2022  Patient Name: Catina Perez        MRN: [id number]    : 1924  (80 y.o.)  Gender: female   Referring Practitioner: Dr. Paulo Braun  Diagnosis: Fall;Left flank contusion  Additional Pertinent Hx: The patient is a 80 y.o. female presented to the emergency room by EMS from [facility] assisted living for evaluation of severe pain in the left lower back/flank. Patient states that she was going to the bathroom with her walker and lost her balance and fell on her left side. She does not remember hitting her head. She believes that the handle of the walker hit her left lower back. She developed severe sharp pains in the left flank, constant, worse with movements. She was unable to get up and started hollering for help. Staff came to assist her and called EMS to transfer her to ER for evaluation. Patient found to have UTI and was admitted for treatment and referred to PT to assess mobility   Past Medical History:   Diagnosis Date    GERD (gastroesophageal reflux disease)     Hyperlipidemia     Hypertension     Urinary incontinence      Past Surgical History:   Procedure Laterality Date    BUNIONECTOMY      CHOLECYSTECTOMY, LAPAROSCOPIC  12    TONSILLECTOMY         Restrictions         Subjective         Pain Level: 0    Orientation  Overall Orientation Status: Within Functional Limits    Home Living  Type of Home:  (independent living at [facility])  Home Equipment: [place], rolling    Prior Level of Function  Additional Comments: Patient up in room and facility independently using rollator. She states she gets to bathroom her self and showers herself.   She does not have a lift chair and is worried about transfers now with left flank pain  Prior Function  Receives Help From: Other (comment) (facility completes all laundry, cleaning & cooking tasks)  ADL Assistance:  (states the only thing she needs help with are her KARAN hoses & socks)  Homemaking Assistance:  (facility completed all laundry, cleaning, & cooking for pt)  Ambulation Assistance:  (rollator)  Transfer Assistance: Independent  Additional Comments: Patient up in room at facility independently using rollator. She states she gets to bathroom her self and showers herself. She does not have a lift chair and is worried about transfers now with left flank pain      Objective                       Strength RLE  Strength RLE: WFL  Comment: For patients age she exhibits functional strength  Strength LLE  Strength LLE: WFL  Comment: For patients age she exhibits functional strength                Sit to Stand: Supervision  Stand to sit: Supervision              Balance  Sitting - Static: Good  Sitting - Dynamic: Good  Standing - Static: Good    Activity Tolerance: Patient tolerated evaluation without incident   Chart Reviewed: Yes  Assessment: Patient admitted following a fall at her assisted living at [facility]. She sustained a left flank contusion and also found to have an UTI. Referred to PT to assess mobility and transfers. Patient completed sit to stand with supervision and ambulated 100-150 ft with wh walker and supervision. No LOB noted. Strength and ROM of extremities is functional for patient's age. Based on her present status, do not identify any skilled PT needs however patient may require supervision and monitoring upon return to her assisted living apartment to ensure she is able to complete her functional mobility activities in her room for chair and bed tranfers etc  Therapy Prognosis: Good  Discharge Recommendations:  (Return to assisted living)     Type of Devices: Gait belt,Call light within reach,Left in chair,Nurse notified     Plan:  No skilled PT needs at present. Recommend continued ambulation with nursing staff.   Upon discharge to assisted living patient may require supervision and monitoring of functional activities in her own environment to ensure safety with transfers in/out of bed, chair , shower etc       Goals  Short Term Goals  Time Frame for Short term goals: 1 visit  Short term goal 1: Assess for safety with transfers and ambulation to permit return to assisted living when medically stable  - MET            Time In: 1425  Time Out: 1445  Timed Coded Minutes: 0  Total Treatment Time: [facility], PT, PT 5/21/2022

## 2022-05-21 NOTE — DISCHARGE INSTRUCTIONS
Discharge Instructions    Admission Date:  5/20/2022  Discharge Date:  5/23/22    Disposition:  Home    Activity:  As tolerated    Diet:  General    Discharge Medication:       Medication List      START taking these medications    cephALEXin 500 MG capsule  Commonly known as: KEFLEX  Take 1 capsule by mouth 2 times daily     traMADol 50 MG tablet  Commonly known as: ULTRAM  Take 1 tablet by mouth every 6 hours as needed for Pain for up to 7 days. CONTINUE taking these medications    acetaminophen 325 MG tablet  Commonly known as: TYLENOL     docusate sodium 100 MG capsule  Commonly known as: COLACE     hydroCHLOROthiazide 12.5 MG capsule  Commonly known as: MICROZIDE  TAKE 1 CAPSULE EVERY DAY     muscle rub 10-15 % Crea cream     omeprazole 20 MG delayed release capsule  Commonly known as: PRILOSEC  TAKE 1 CAPSULE EVERY DAY     traZODone 50 MG tablet  Commonly known as: DESYREL  Take 1 tablet by mouth nightly           Where to Get Your Medications      You can get these medications from any pharmacy    Bring a paper prescription for each of these medications  · traMADol 50 MG tablet     Information about where to get these medications is not yet available    Ask your nurse or doctor about these medications  · cephALEXin 500 MG capsule         Discharge Instructions:  Resume previous medication. Take Keflex 500 mg two times a day x 5 days. Take Tramadol 50 mg by mouth every 6 hours as needed for moderate to severe pain. Encourage deep breathing, splinting with a pillow, and cough several times a day to prevent pneumonia. Activity:  As tolerated with assistance. Diet:  General diet. Follow Up: With your primary care physician Ramon Perry MD) after discharge from [facility].

## 2022-05-21 NOTE — ED NOTES
Report called to Lucho Kevin at the [facility]. Discussed frequent checks regarding patient. She states they can do checks on her during the night, however they cannot provide skilled care. Patient will be educated on use of call bell for assistance to the toilet during the night.      Hi Collins RN  05/20/22 2029

## 2022-05-22 LAB
CULTURE: ABNORMAL
CULTURE: ABNORMAL
SPECIMEN DESCRIPTION: ABNORMAL

## 2022-05-22 PROCEDURE — 2580000003 HC RX 258: Performed by: INTERNAL MEDICINE

## 2022-05-22 PROCEDURE — 94761 N-INVAS EAR/PLS OXIMETRY MLT: CPT

## 2022-05-22 PROCEDURE — 6370000000 HC RX 637 (ALT 250 FOR IP): Performed by: INTERNAL MEDICINE

## 2022-05-22 PROCEDURE — 6360000002 HC RX W HCPCS: Performed by: INTERNAL MEDICINE

## 2022-05-22 PROCEDURE — 1200000000 HC SEMI PRIVATE

## 2022-05-22 RX ORDER — IBUPROFEN 200 MG
400 TABLET ORAL EVERY 6 HOURS PRN
Status: DISCONTINUED | OUTPATIENT
Start: 2022-05-22 | End: 2022-05-23

## 2022-05-22 RX ADMIN — TRAZODONE HYDROCHLORIDE 50 MG: 50 TABLET ORAL at 22:07

## 2022-05-22 RX ADMIN — POLYETHYLENE GLYCOL 3350 17 G: 17 POWDER, FOR SOLUTION ORAL at 16:49

## 2022-05-22 RX ADMIN — ACETAMINOPHEN 650 MG: 325 TABLET, FILM COATED ORAL at 03:02

## 2022-05-22 RX ADMIN — HYDROCHLOROTHIAZIDE 12.5 MG: 25 TABLET ORAL at 08:28

## 2022-05-22 RX ADMIN — DOCUSATE SODIUM 100 MG: 100 CAPSULE, LIQUID FILLED ORAL at 16:49

## 2022-05-22 RX ADMIN — SODIUM CHLORIDE, PRESERVATIVE FREE 10 ML: 5 INJECTION INTRAVENOUS at 08:28

## 2022-05-22 RX ADMIN — TRAMADOL HYDROCHLORIDE 50 MG: 50 TABLET, COATED ORAL at 08:28

## 2022-05-22 RX ADMIN — DOCUSATE SODIUM 100 MG: 100 CAPSULE, LIQUID FILLED ORAL at 22:14

## 2022-05-22 RX ADMIN — PANTOPRAZOLE SODIUM 40 MG: 40 TABLET, DELAYED RELEASE ORAL at 06:34

## 2022-05-22 RX ADMIN — ENOXAPARIN SODIUM 30 MG: 100 INJECTION SUBCUTANEOUS at 08:28

## 2022-05-22 RX ADMIN — IBUPROFEN 400 MG: 200 TABLET, FILM COATED ORAL at 22:07

## 2022-05-22 RX ADMIN — SODIUM CHLORIDE, PRESERVATIVE FREE 10 ML: 5 INJECTION INTRAVENOUS at 22:10

## 2022-05-22 RX ADMIN — CEFTRIAXONE SODIUM 1000 MG: 1 INJECTION, POWDER, FOR SOLUTION INTRAMUSCULAR; INTRAVENOUS at 22:08

## 2022-05-22 RX ADMIN — ACETAMINOPHEN 650 MG: 325 TABLET, FILM COATED ORAL at 08:28

## 2022-05-22 ASSESSMENT — PAIN DESCRIPTION - ORIENTATION
ORIENTATION: LEFT;MID;LOWER
ORIENTATION: LEFT;LOWER;MID
ORIENTATION: LEFT;LOWER;MID

## 2022-05-22 ASSESSMENT — PAIN SCALES - GENERAL
PAINLEVEL_OUTOF10: 5
PAINLEVEL_OUTOF10: 4
PAINLEVEL_OUTOF10: 0
PAINLEVEL_OUTOF10: 5
PAINLEVEL_OUTOF10: 3

## 2022-05-22 ASSESSMENT — PAIN DESCRIPTION - PAIN TYPE
TYPE: ACUTE PAIN
TYPE: ACUTE PAIN

## 2022-05-22 ASSESSMENT — PAIN DESCRIPTION - LOCATION
LOCATION: BACK

## 2022-05-22 ASSESSMENT — PAIN DESCRIPTION - ONSET: ONSET: ON-GOING

## 2022-05-22 ASSESSMENT — PAIN DESCRIPTION - DESCRIPTORS
DESCRIPTORS: ACHING

## 2022-05-22 ASSESSMENT — PAIN DESCRIPTION - FREQUENCY
FREQUENCY: INTERMITTENT
FREQUENCY: INTERMITTENT

## 2022-05-22 ASSESSMENT — PAIN - FUNCTIONAL ASSESSMENT
PAIN_FUNCTIONAL_ASSESSMENT: PREVENTS OR INTERFERES SOME ACTIVE ACTIVITIES AND ADLS
PAIN_FUNCTIONAL_ASSESSMENT: PREVENTS OR INTERFERES SOME ACTIVE ACTIVITIES AND ADLS

## 2022-05-22 NOTE — PROGRESS NOTES
Hospitalist Progress Note  5/22/2022 8:28 AM  Subjective:   Admit Date: 5/20/2022  PCP: Sherry Pichardo MD    Interval History:     The patient still complains of significant pain in the left flank/thoracic spine area. Reports no cough, no shortness of breath. Appetite is good. Vitals stable    Diet: ADULT DIET; Regular  Medications:   Scheduled Meds:   hydroCHLOROthiazide  12.5 mg Oral Daily    pantoprazole  40 mg Oral QAM AC    traZODone  50 mg Oral Nightly    sodium chloride flush  5-40 mL IntraVENous 2 times per day    enoxaparin  30 mg SubCUTAneous Daily    cefTRIAXone (ROCEPHIN) IV  1,000 mg IntraVENous Q24H     Continuous Infusions:   sodium chloride       PRN Medications: docusate sodium, sodium chloride flush, sodium chloride, ondansetron **OR** ondansetron, polyethylene glycol, acetaminophen **OR** acetaminophen, morphine, traMADol    Objective:   Vitals: BP (!) 143/58   Pulse 72   Temp 97.7 °F (36.5 °C) (Oral)   Resp 18   Ht 5' 7\" (1.702 m)   Wt 182 lb 11.2 oz (82.9 kg)   LMP  (LMP Unknown)   SpO2 92%   BMI 28.61 kg/m²   BMI: Body mass index is 28.61 kg/m².     CBC:   Recent Labs     05/20/22  1658   WBC 7.1   HGB 14.0        BMP:    Recent Labs     05/20/22  1658 05/21/22  0513    138   K 4.1 3.7    103   CO2 28 27   BUN 26* 23   CREATININE 1.21* 1.10*   GLUCOSE 104* 132*     Hepatic:   Recent Labs     05/20/22  1658   AST 21   ALT 15   BILITOT 0.16*   ALKPHOS 145*         Physical Exam:    General Appearance: Hard of hearing, alert and oriented to person, place and time, in no acute distress  Cardiovascular: normal rate, regular rhythm, normal S1 and S2, 2/6 systolic ejection murmur  Pulmonary/Chest: clear to auscultation bilaterally- no wheezes, rales or rhonchi  Abdomen: soft, non-tender, non-distended, normal bowel sounds   Extremities: no cyanosis, clubbing or edema  Skin: warm and dry, no rash   Head: normocephalic and atraumatic  Neurological: alert, oriented, normal speech, no focal findings or movement disorder noted        Assessment and Plan:        1. UTI - continue  on IV Rocephin, started  5/20, urine cultures pending  2. Intractable left flank pain secondary to contusion - on prn IV morphine, oral tramadol, prn Tylenol. Will add prn ibuprofen to help with inflammation/pain  3. S/p accidental fall, initial encounter - consult PT and OT  4. Generalized weakness - consult  for possible ECF arrangements, PT and OT for evaluation  5. Hypertension - blood pressure stable continue on HCTZ  6. GERD - symptoms controlled, on Protonix  7.   CKD stage 3 - appears to be at baseline      Patient continues to require inpatient admission related to need of IV antibiotics, IV morphine for pain control, PT and OT evaluation, social service consult for [facility] placement      Electronically signed by Leslie Viera MD on 5/22/2022 at 8:28 AM    Rounding Hospitalist

## 2022-05-22 NOTE — PROGRESS NOTES
Answered patient call light, patient wanted her lunch tray cleared away and asked for a pencil and paper \"to take some notes\". Patient reports 5/10 pain in her left mid lower back where she fell, but declines offer for ibuprofen at this time. Patient states \"I want to hold out til it's time for tramadol. \" [place] made patient aware that it would still be 2 more hours before she could have any tramadol, and patient stated \"as long as I don't move, I will be fine. \" No further needs, will continue to monitor.

## 2022-05-22 NOTE — PLAN OF CARE
Ambulates to BR with wheeled walker and steady gait. Takes 650 mg Tylenol for pain she rated 6/10 scale. No swallowing deficits noted at this time.

## 2022-05-23 ENCOUNTER — APPOINTMENT (OUTPATIENT)
Dept: GENERAL RADIOLOGY | Age: 87
DRG: 605 | End: 2022-05-23
Payer: MEDICARE

## 2022-05-23 VITALS
OXYGEN SATURATION: 92 % | WEIGHT: 185.5 LBS | SYSTOLIC BLOOD PRESSURE: 118 MMHG | RESPIRATION RATE: 18 BRPM | HEART RATE: 66 BPM | BODY MASS INDEX: 29.11 KG/M2 | TEMPERATURE: 97.4 F | HEIGHT: 67 IN | DIASTOLIC BLOOD PRESSURE: 66 MMHG

## 2022-05-23 LAB
SARS-COV-2, RAPID: NOT DETECTED
SPECIMEN DESCRIPTION: NORMAL

## 2022-05-23 PROCEDURE — 6370000000 HC RX 637 (ALT 250 FOR IP): Performed by: INTERNAL MEDICINE

## 2022-05-23 PROCEDURE — 6360000002 HC RX W HCPCS: Performed by: INTERNAL MEDICINE

## 2022-05-23 PROCEDURE — 94761 N-INVAS EAR/PLS OXIMETRY MLT: CPT

## 2022-05-23 PROCEDURE — 87635 SARS-COV-2 COVID-19 AMP PRB: CPT

## 2022-05-23 PROCEDURE — 71100 X-RAY EXAM RIBS UNI 2 VIEWS: CPT

## 2022-05-23 PROCEDURE — 2580000003 HC RX 258: Performed by: INTERNAL MEDICINE

## 2022-05-23 PROCEDURE — C9803 HOPD COVID-19 SPEC COLLECT: HCPCS

## 2022-05-23 RX ORDER — CEPHALEXIN 500 MG/1
500 CAPSULE ORAL 2 TIMES DAILY
Qty: 10 CAPSULE | Refills: 0
Start: 2022-05-23

## 2022-05-23 RX ORDER — TRAMADOL HYDROCHLORIDE 50 MG/1
50 TABLET ORAL EVERY 6 HOURS PRN
Qty: 28 TABLET | Refills: 0 | Status: SHIPPED | OUTPATIENT
Start: 2022-05-23 | End: 2022-05-30

## 2022-05-23 RX ADMIN — SODIUM CHLORIDE, PRESERVATIVE FREE 10 ML: 5 INJECTION INTRAVENOUS at 09:18

## 2022-05-23 RX ADMIN — HYDROCHLOROTHIAZIDE 12.5 MG: 25 TABLET ORAL at 09:18

## 2022-05-23 RX ADMIN — TRAMADOL HYDROCHLORIDE 50 MG: 50 TABLET, COATED ORAL at 11:39

## 2022-05-23 RX ADMIN — ENOXAPARIN SODIUM 30 MG: 100 INJECTION SUBCUTANEOUS at 09:18

## 2022-05-23 RX ADMIN — TRAMADOL HYDROCHLORIDE 50 MG: 50 TABLET, COATED ORAL at 00:19

## 2022-05-23 RX ADMIN — PANTOPRAZOLE SODIUM 40 MG: 40 TABLET, DELAYED RELEASE ORAL at 06:26

## 2022-05-23 RX ADMIN — MAGNESIUM CITRATE 296 ML: 1.75 LIQUID ORAL at 06:26

## 2022-05-23 ASSESSMENT — PAIN DESCRIPTION - LOCATION: LOCATION: RIB CAGE

## 2022-05-23 ASSESSMENT — PAIN - FUNCTIONAL ASSESSMENT: PAIN_FUNCTIONAL_ASSESSMENT: PREVENTS OR INTERFERES SOME ACTIVE ACTIVITIES AND ADLS

## 2022-05-23 ASSESSMENT — PAIN DESCRIPTION - ORIENTATION: ORIENTATION: LEFT

## 2022-05-23 ASSESSMENT — PAIN SCALES - GENERAL
PAINLEVEL_OUTOF10: 3
PAINLEVEL_OUTOF10: 3
PAINLEVEL_OUTOF10: 10
PAINLEVEL_OUTOF10: 0

## 2022-05-23 ASSESSMENT — PAIN DESCRIPTION - ONSET: ONSET: ON-GOING

## 2022-05-23 ASSESSMENT — PAIN DESCRIPTION - DESCRIPTORS: DESCRIPTORS: SHARP

## 2022-05-23 NOTE — PROGRESS NOTES
Hospitalist Progress Note  5/23/2022 6:21 AM  Subjective:   Admit Date: 5/20/2022  PCP: Gillian Recio MD    Interval History: Federico Lopez states she continues to have significant pain with movement, coughing, or hiccupping. No chest pain or SOB. Appetite is okay, no nausea. No BM since admission, \"I need to go\". No trouble urinating. Concerned about going back to assisted living, \"they don't have the staff to help me, I know I won't get the care I need\". Diet: ADULT DIET; Regular  Medications:   Scheduled Meds:   magnesium citrate  296 mL Oral Once    hydroCHLOROthiazide  12.5 mg Oral Daily    pantoprazole  40 mg Oral QAM AC    traZODone  50 mg Oral Nightly    sodium chloride flush  5-40 mL IntraVENous 2 times per day    enoxaparin  30 mg SubCUTAneous Daily    cefTRIAXone (ROCEPHIN) IV  1,000 mg IntraVENous Q24H     Continuous Infusions:   sodium chloride         Patient's current medications documented, reviewed, and updated. CBC:   Recent Labs     05/20/22  1658   WBC 7.1   HGB 14.0        BMP:    Recent Labs     05/20/22  1658 05/21/22  0513    138   K 4.1 3.7    103   CO2 28 27   BUN 26* 23   CREATININE 1.21* 1.10*   GLUCOSE 104* 132*     Hepatic:   Recent Labs     05/20/22  1658   AST 21   ALT 15   BILITOT 0.16*   ALKPHOS 145*     Troponin: No results for input(s): TROPONINI in the last 72 hours. BNP: No results for input(s): BNP in the last 72 hours. Lipids: No results for input(s): CHOL, HDL in the last 72 hours. Invalid input(s): LDLCALCU  INR: No results for input(s): INR in the last 72 hours. Objective:   Vitals: BP (!) 116/58   Pulse 66   Temp 98 °F (36.7 °C) (Oral)   Resp 18   Ht 5' 7\" (1.702 m)   Wt 185 lb 8 oz (84.1 kg)   LMP  (LMP Unknown)   SpO2 94%   BMI 29.05 kg/m²   General appearance: alert and cooperative with exam  HEENT: Head: Normocephalic, no lesions, without obvious abnormality.   Eye: Normal external eye, conjunctiva, lids cornea, PARAM.  Nose: Normal external nose, mucus membranes and septum. Neck: no adenopathy, no carotid bruit and supple, symmetrical, trachea midline  Lungs: clear to auscultation bilaterally  Heart: regular rate and rhythm, S1, S2 normal and I/VI systolic murmur. Abdomen: soft, non-tender; bowel sounds normal; no masses,  no organomegaly  Extremities: extremities normal, atraumatic, no cyanosis or edema  Neurologic: Mental status: Alert, oriented, thought content appropriate    Assessment and Plan:   1. Intractable left flank pain secondary to contusion after fall - on PRN Tramadol and Tylenol. Ibuprofen added PRN for pain. No acute changes seen on CT scan of the abdomen / pelvis. 2.  UTI - secondary to E.coli sensitive to Rocephin which she was started on at time of admission. 3.  S/P accidental fall - PT / OT evaluated and didn't feel she was a candidate for therapy secondary to being independent. 4. HTN - controlled on HCTZ. 5.  CKD stage III - stable. 6.  GERD - controlled on Protonix. 7.  Constipation - no BM since admission. Plan:  1. Magnesium Citrate 1 bottle this am.  2.  Skilled care coordinator to see if she qualifies for swing bed. 3.  If she does not qualify for swing bed or ECF placement will DC back to assisted living today. 4.  Check Xray of the ribs to rule out rib fracture.          DVT prophylaxis:   [x] Lovenox   [] SCDs   [] SQ Heparin   [] Encourage ambulation, low risk for DVT, no chemical or mechanical    prophylaxis necessary      [] Already on Anticoagulation    Patient Active Problem List:     Esophageal reflux     Diaphragmatic hernia     Essential hypertension, benign     Pure hypercholesterolemia     Cholelithiases     Cholangitis     Endometrial thickening on ultrasound     Cervical stenosis (uterine cervix)     Arthritis of low back     Disease due to severe acute respiratory syndrome coronavirus 2 (SARS-CoV-2)     Acute cystitis without hematuria     UTI (urinary tract infection)      Documentation of the Current Medications in the Medical Record    (x)  I have utilized all available immediate resources to obtain, update, or review the patient's current medications. (Satisfies MIPS Performance)  If Yes, Stop Here  ( )  The patient is not eligible for medication reconciliation; the patient is in an emergent medical situation where delaying treatment would jeopardize the patient's health. (MIPS Performance exception / exclusion)  ( )  I did not confirm, update or review the patient's current list of medications today. (Does not satisfy MIPS Performance)      Advanced Care Plan    (x)  I confirmed that the patient's Advanced Care Plan is present, code status documented, or surrogate decision maker is listed in the patient's medical record. ( )  The patient's advanced care plan is not present because:  (select)   ( ) I confirmed today that the patient does not wish or was not able to name a surrogate decision maker or provide an [address] ( ) Hospice care is currently being provided or has been provided this calender year. ( )  I did not confirm today the presence of an [address] or surrogate decision maker documented within the patient's medical record. (Does not satisfy MIPS performance).             Ceasar Arzate MD, MD  [facility] Hospitalist

## 2022-05-23 NOTE — PROGRESS NOTES
Quality flow rounds held on 5/23/22     Faustino Cowan is admitted for  Acute cystitis without hematuria. Length of stay 3. Education:    Needed Education: UTI, diet, follow up, meds      Do you have any questions regarding your plan of care while at the hospital? denies    Planned Disposition:               []  Home when able                [] Swing Bed                [x] ECF/SNF[facility]               [] Other/TBD    Barriers to Discharge:    Can you afford your medications? yes   Do you have transportation to follow up appointments? Family provides   Do you need any new equipment at home? denies   Current equipment includes Walker, rolling       Do you have a living will or durable power of  for healthcare? yes               If yes do we have a copy on file? yes    Do you or your family have any questions or concerns we haven't already discussed? Denies    Lives at the [place] [address] at the [address] [place] [address]. PCP is Dr Bernett Habermann. Plan is to be discharged today to the skilled section at the [facility]. Denies needs at this time.
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
## 2022-05-23 NOTE — PROGRESS NOTES
Report called to Layla Flores at [facility].

## 2022-05-23 NOTE — PROGRESS NOTES
Up to BR with walker and steady gait. Pt incont of urine. Pt takes warmed apple juice, apple sauce and prune juice to help with her constipation. Back to the chair to rest. Personal alarm is in place for pt safety. Call light within reach.

## 2022-05-23 NOTE — PROGRESS NOTES
TIN and RN case manager met with pt to complete assessment. Pt is alert and oriented and cooperative with assessment. Pt is a 80year old female admitted for acute cystitis without hematuria. Pt lives at the [facility] assisted living. Pt reports that she has been there about 4 years. Pt states that she uses a walker to get around with. Pt receives assistance from staff as needed and staff or family transport her to appointments. Pt is a DNR CC and follows with Dr Trang Crain as PCP. Pt has advance directives on file in her medical record. Pt reports that her medications are affordable. Plan for pt to return to [facility] today. SW spoke with [facility] and they plan to have her come back skilled first so they can assist her in regaining strength to be able to return to her apartment. HENS completed and covid test to be completed. [place] to pick pt up about 1 pm today. No further needs identified.  Lara Call ARABELLA BOLIVAR 5/23/2022

## 2022-05-23 NOTE — PROGRESS NOTES
Pt up in chair. C/o of being constipated and hopes she does not go back to the NH without having a BM first. Pt took colace and Miralax for previous nurse. Pt eating grape and another prune juice. Denies the need to void presently.

## 2022-05-23 NOTE — DISCHARGE INSTR - COC
M47.819    Disease due to severe acute respiratory syndrome coronavirus 2 (SARS-CoV-2) U07.1    Acute cystitis without hematuria N30.00    UTI (urinary tract infection) N39.0       Isolation/Infection:   Isolation            No Isolation          Patient Infection Status       Infection Onset Added Last Indicated Last Indicated By Review Planned Expiration Resolved Resolved By    None active    Resolved    COVID-19 20 COVID-19   10/10/20     COVID-19 (Rule Out) 20 COVID-19, PCR (Ordered)   20 Rule-Out Test Resulted            Nurse Assessment:  Last Vital Signs: BP (!) 116/58   Pulse 66   Temp 98 °F (36.7 °C) (Oral)   Resp 18   Ht 5' 7\" (1.702 m)   Wt 185 lb 8 oz (84.1 kg)   LMP  (LMP Unknown)   SpO2 94%   BMI 29.05 kg/m²     Last documented pain score (0-10 scale): Pain Level: 10  Last Weight:   Wt Readings from Last 1 Encounters:   22 185 lb 8 oz (84.1 kg)     Mental Status:  oriented and alert    IV Access:  - None    Nursing Mobility/ADLs:  Walking   Assisted  Transfer  Assisted  Bathing  Assisted  Dressing  Assisted  Toileting  Assisted  Feeding  Independent  Med Admin  Assisted  Med Delivery   whole    Wound Care Documentation and Therapy:        Elimination:  Continence: Bowel: Yes  Bladder: Yes, occassionally incontinent   Urinary Catheter: None   Colostomy/Ileostomy/Ileal Conduit: No       Date of Last BM: 2022    Intake/Output Summary (Last 24 hours) at 2022 0738  Last data filed at 2022 0250  Gross per 24 hour   Intake 1440 ml   Output --   Net 1440 ml     I/O last 3 completed shifts:   In: 1440 [P.O.:1440]  Out: -     Safety Concerns:     History of Falls (last 30 days) and At Risk for Falls    Impairments/Disabilities:      Hearing    Nutrition Therapy:  Current Nutrition Therapy:   - Oral Diet:  General    Routes of Feeding: Oral  Liquids: No Restrictions  Daily Fluid Restriction: no  Last Modified Barium Swallow with Video (Video Swallowing Test): not done    Treatments at the Time of Hospital Discharge:   Respiratory Treatments: none  Oxygen Therapy:  is not on home oxygen therapy. Ventilator:    - No ventilator support    Rehab Therapies: Physical Therapy and Occupational Therapy  Weight Bearing Status/Restrictions: No weight bearing restrictions  Other Medical Equipment (for information only, NOT a DME order):  walker  Other Treatments: none    Patient's personal belongings (please select all that are sent with patient):  Glasses, Hearing Aides bilateral, Jewelry, *** 2 gold colored, 3 silver colored     RN SIGNATURE:  Electronically signed by Yanick Morley RN on 5/23/22 at 11:21 AM EDT    CASE MANAGEMENT/SOCIAL WORK SECTION    Inpatient Status Date: 5/20/2022    Readmission Risk Assessment Score:  Readmission Risk              Risk of Unplanned Readmission:  9           Discharging to Facility/ Agency   Name: [facility]  Address: [address] [facility] [facility] [address]  Phone: [phone]    Dialysis Facility (if applicable)   Name:  Address:  Dialysis Schedule:  Phone:  Fax:    / signature: Electronically signed by OSMEL Vo on 5/23/22 at 7:38 AM EDT    PHYSICIAN SECTION    Prognosis: {Prognosis:1909200616}    Condition at Discharge: [address] Patient Condition:[id number]}    Rehab Potential (if transferring to Rehab): {Prognosis:4210387994}    Recommended Labs or Other Treatments After Discharge: ***    Physician Certification: I certify the above information and transfer of Jonathon Velasquez  is necessary for the continuing treatment of the diagnosis listed and that she requires {Admit to Appropriate Level of Care:94328} for {GREATER/LESS:357832187} 30 days.      Update Admission H&P: {CHP DME Changes in VNNVP:691668378}    PHYSICIAN SIGNATURE:  {Esignature:057299950}

## 2022-05-23 NOTE — DISCHARGE SUMMARY
Hospitalist Discharge Summary    Alannah Lofton  :  1924  MRN:  [id number]    Admit date:  2022  Discharge date:  22    Admitting Physician:  Jose Alfaro MD    Discharge Diagnoses:   S/P fall with left intractable flank pain. UTI - secondary to E.coli. HTN      CKD stage III      Admission Condition:  poor      Discharged Condition:  fair    Hospital Course: The patient is a 80 y.o. female presented to the emergency room by EMS from [facility] assisted living for evaluation of severe pain in the left lower back/flank. Patient states that she was going to the bathroom with her walker and lost her balance and fell on her left side. She does not remember hitting her head. She believes that the handle of the walker hit her left lower back. She developed severe sharp pains in the left flank, constant, worse with movements. She was unable to get up and started hollering for help. Staff came to assist her and called EMS to transfer her to ER for evaluation. Patient reports no other symptoms such as headache, dizziness, chest pain, cough, shortness of breath, nausea/vomiting. Work-up in the emergency room revealed stable vitals. Labs revealed sodium 139, potassium 4.1, BUN 26, creatinine 1.2, glucose 104, calcium 10. LFTs unremarkable except elevated alk phos 145. CBC essentially normal.  UA revealed positive nitrates, negative leukocyte esterase, 2-5 WBCs and rare bacteria. Patient had multiple imaging studies done including CT head without contrast which was negative for acute abnormalities. CT cervical spine and thoracic spine without contrast revealing no acute bony abnormalities, multilevel moderate degenerative changes. CT abdomen pelvis revealed no solid or hollow organ injury, no free air or free fluid, no fractures, multilevel marked degenerative changes of the lumbar spine. Milena Pascal was admitted to the hospital for intractable pain.   IV Morphine was started which was transitioned to oral Tramadol as her pain improved. PT / OT was consulted but did not pick her up for continued therapy as she was independent in her ability to ambulate and care for herself. At time of admission she was placed on IV Rocephin for a UTI. The culture grew out E.coli sensitive to Rocephin. Xray of the ribs were evaluated with no acute fracture seen. During her admission she complained of constipation and Magnesium Citrate was ordered. Skilled care coordinator was consulted to evaluate for swing bed but was unable to skill her for our swing bed program.   was consulted and arranged for her to go back to the [facility] under skilled care until she could transition back to assisted living. Discharge Exam:    Vitals: BP (!) 116/58   Pulse 66   Temp 98 °F (36.7 °C) (Oral)   Resp 18   Ht 5' 7\" (1.702 m)   Wt 185 lb 8 oz (84.1 kg)   LMP  (LMP Unknown)   SpO2 94%   BMI 29.05 kg/m²   General appearance: alert and cooperative with exam  HEENT: Head: Normocephalic, no lesions, without obvious abnormality. Eye: Normal external eye, conjunctiva, lids cornea, PARAM. Nose: Normal external nose, mucus membranes and septum. Neck: no adenopathy, no carotid bruit and supple, symmetrical, trachea midline  Lungs: clear to auscultation bilaterally  Heart: regular rate and rhythm, S1, S2 normal and I/VI systolic murmur. Abdomen: soft, non-tender; bowel sounds normal; no masses,  no organomegaly  Extremities: extremities normal, atraumatic, no cyanosis or edema  Neurologic: Mental status: Alert, oriented, thought content appropriate    Discharge Medications:         Medication List      START taking these medications    cephALEXin 500 MG capsule  Commonly known as: KEFLEX  Take 1 capsule by mouth 2 times daily     traMADol 50 MG tablet  Commonly known as: ULTRAM  Take 1 tablet by mouth every 6 hours as needed for Pain for up to 7 days.         CONTINUE taking these medications acetaminophen 325 MG tablet  Commonly known as: TYLENOL     docusate sodium 100 MG capsule  Commonly known as: COLACE     hydroCHLOROthiazide 12.5 MG capsule  Commonly known as: MICROZIDE  TAKE 1 CAPSULE EVERY DAY     muscle rub 10-15 % Crea cream     omeprazole 20 MG delayed release capsule  Commonly known as: PRILOSEC  TAKE 1 CAPSULE EVERY DAY     traZODone 50 MG tablet  Commonly known as: DESYREL  Take 1 tablet by mouth nightly           Where to Get Your Medications      You can get these medications from any pharmacy    Bring a paper prescription for each of these medications  · traMADol 50 MG tablet     Information about where to get these medications is not yet available    Ask your nurse or doctor about these medications  · cephALEXin 500 MG capsule         Consults:  PT, OT, , and Skilled care coordinator. Significant Diagnostic Studies:  Labs, UA, Urine culture, CT cervical spine, CT Head, CT thoracic spine, CT abdomen / pelvis, Xray ribs. Treatments:   IV Morphine, Tramadol, Tylenol, Ibuprofen, Magnesium Citrate. Disposition:   [place] for skilled care. Discharge Instructions:  Resume previous medication. Take Keflex 500 mg two times a day x 5 days. Take Tramadol 50 mg by mouth every 6 hours as needed for moderate to severe pain. Encourage deep breathing, splinting with a pillow, and cough several times a day to prevent pneumonia. Activity:  As tolerated with assistance. Diet:  General diet. Follow up with Maddie Gilmore MD after discharge from [facility]. Discharge time =  32 minutes.      Signed:  Mohan Park MD  5/23/2022, 6:32 AM

## 2022-06-08 ENCOUNTER — CARE COORDINATION (OUTPATIENT)
Dept: CASE MANAGEMENT | Age: 87
End: 2022-06-08

## 2022-06-08 DIAGNOSIS — N30.00 ACUTE CYSTITIS WITHOUT HEMATURIA: Primary | ICD-10-CM

## 2022-06-08 PROCEDURE — 1111F DSCHRG MED/CURRENT MED MERGE: CPT | Performed by: FAMILY MEDICINE

## 2022-06-13 ENCOUNTER — TELEPHONE (OUTPATIENT)
Dept: FAMILY MEDICINE CLINIC | Age: 87
End: 2022-06-13

## 2022-06-13 NOTE — TELEPHONE ENCOUNTER
Ok to call the [facility] -- assistant living side and tell them to change Renuka's Trazodone dose to taking PRN for insomnia.

## 2022-06-19 PROBLEM — N39.0 UTI (URINARY TRACT INFECTION): Status: RESOLVED | Noted: 2022-05-20 | Resolved: 2022-06-19

## 2022-07-26 ENCOUNTER — TELEPHONE (OUTPATIENT)
Dept: FAMILY MEDICINE CLINIC | Age: 87
End: 2022-07-26

## 2022-07-26 RX ORDER — FUROSEMIDE 20 MG/1
20 TABLET ORAL DAILY
COMMUNITY

## 2022-07-26 NOTE — TELEPHONE ENCOUNTER
Patricia Mask nurse form the [facility] sending fax. Godwin Nelson has edema in her legs and ankles. Per Dr Kay Abrams for lasix 20mg daily in the AM. Follow low salt diet.

## 2022-08-22 RX ORDER — HYDROCHLOROTHIAZIDE 12.5 MG/1
CAPSULE, GELATIN COATED ORAL
Qty: 90 CAPSULE | Refills: 1 | Status: SHIPPED | OUTPATIENT
Start: 2022-08-22

## 2022-08-22 RX ORDER — OMEPRAZOLE 20 MG/1
CAPSULE, DELAYED RELEASE ORAL
Qty: 90 CAPSULE | Refills: 1 | Status: SHIPPED | OUTPATIENT
Start: 2022-08-22

## 2022-10-20 ENCOUNTER — OFFICE VISIT (OUTPATIENT)
Dept: FAMILY MEDICINE CLINIC | Age: 87
End: 2022-10-20
Payer: MEDICARE

## 2022-10-20 ENCOUNTER — HOSPITAL ENCOUNTER (OUTPATIENT)
Age: 87
Setting detail: SPECIMEN
Discharge: HOME | End: 2022-10-20
Payer: MEDICARE

## 2022-10-20 VITALS
DIASTOLIC BLOOD PRESSURE: 60 MMHG | BODY MASS INDEX: 27.41 KG/M2 | WEIGHT: 175 LBS | HEART RATE: 74 BPM | OXYGEN SATURATION: 96 % | SYSTOLIC BLOOD PRESSURE: 110 MMHG

## 2022-10-20 DIAGNOSIS — N39.41 URGE INCONTINENCE: ICD-10-CM

## 2022-10-20 DIAGNOSIS — R82.90 BAD ODOR OF URINE: ICD-10-CM

## 2022-10-20 DIAGNOSIS — K21.9 GASTROESOPHAGEAL REFLUX DISEASE WITHOUT ESOPHAGITIS: ICD-10-CM

## 2022-10-20 DIAGNOSIS — I10 ESSENTIAL HYPERTENSION, BENIGN: Primary | ICD-10-CM

## 2022-10-20 DIAGNOSIS — R23.8 SKIN BREAKDOWN: ICD-10-CM

## 2022-10-20 LAB
BILIRUBIN, POC: NORMAL
BLOOD URINE, POC: NORMAL
CLARITY, POC: NORMAL
COLOR, POC: NORMAL
GLUCOSE URINE, POC: NEGATIVE
KETONES, POC: NEGATIVE
LEUKOCYTE EST, POC: NORMAL
NITRITE, POC: NEGATIVE
PH, POC: 5.5
PROTEIN, POC: NEGATIVE
SPECIFIC GRAVITY, POC: 1.02
UROBILINOGEN, POC: 1

## 2022-10-20 PROCEDURE — 87186 SC STD MICRODIL/AGAR DIL: CPT

## 2022-10-20 PROCEDURE — 1036F TOBACCO NON-USER: CPT | Performed by: FAMILY MEDICINE

## 2022-10-20 PROCEDURE — 81002 URINALYSIS NONAUTO W/O SCOPE: CPT | Performed by: FAMILY MEDICINE

## 2022-10-20 PROCEDURE — 99214 OFFICE O/P EST MOD 30 MIN: CPT | Performed by: FAMILY MEDICINE

## 2022-10-20 PROCEDURE — 87088 URINE BACTERIA CULTURE: CPT

## 2022-10-20 PROCEDURE — 1090F PRES/ABSN URINE INCON ASSESS: CPT | Performed by: FAMILY MEDICINE

## 2022-10-20 PROCEDURE — G8427 DOCREV CUR MEDS BY ELIG CLIN: HCPCS | Performed by: FAMILY MEDICINE

## 2022-10-20 PROCEDURE — 87077 CULTURE AEROBIC IDENTIFY: CPT

## 2022-10-20 PROCEDURE — G8417 CALC BMI ABV UP PARAM F/U: HCPCS | Performed by: FAMILY MEDICINE

## 2022-10-20 PROCEDURE — 0509F URINE INCON PLAN DOCD: CPT | Performed by: FAMILY MEDICINE

## 2022-10-20 PROCEDURE — 1123F ACP DISCUSS/DSCN MKR DOCD: CPT | Performed by: FAMILY MEDICINE

## 2022-10-20 PROCEDURE — 87086 URINE CULTURE/COLONY COUNT: CPT

## 2022-10-20 PROCEDURE — G8484 FLU IMMUNIZE NO ADMIN: HCPCS | Performed by: FAMILY MEDICINE

## 2022-10-20 RX ORDER — CIPROFLOXACIN 250 MG/1
250 TABLET, FILM COATED ORAL 2 TIMES DAILY
Qty: 14 TABLET | Refills: 0 | Status: SHIPPED | OUTPATIENT
Start: 2022-10-20 | End: 2022-10-27

## 2022-10-20 ASSESSMENT — ENCOUNTER SYMPTOMS
EYE DISCHARGE: 0
BLOOD IN STOOL: 0
HEARTBURN: 0
HOARSE VOICE: 0
ABDOMINAL PAIN: 0
CHOKING: 0
EYE REDNESS: 0
SHORTNESS OF BREATH: 0
COUGH: 0
DIARRHEA: 0
VOMITING: 0
NAUSEA: 0

## 2022-10-20 NOTE — PROGRESS NOTES
HPI Notes    Name: Bert Carranza  : 1924        Chief Complaint:     Chief Complaint   Patient presents with    Hypertension    Gastroesophageal Reflux    Incontinence    Wound Check     Pt c/o sore area on buttocks for several months. Pt is using Aquaphor to the area. Pt would like Dr Mancera Doctor to look at area. History of Present Illness:     Bert Carranza is a 80 y.o.  female who presents with Hypertension, Gastroesophageal Reflux, Incontinence, and Wound Check (Pt c/o sore area on buttocks for several months. Pt is using Aquaphor to the area. Pt would like Dr Mancera Doctor to look at area.)      Hypertension  This is a chronic problem. The current episode started more than 1 year ago. The problem is unchanged. The problem is controlled. Pertinent negatives include no chest pain, headaches, malaise/fatigue, palpitations, peripheral edema or shortness of breath. (Pt gets a little leg swelling but wears KARAN hose) There are no associated agents to hypertension. Risk factors for coronary artery disease include post-menopausal state. The current treatment provides significant improvement. Gastroesophageal Reflux  She reports no abdominal pain, no chest pain, no choking, no coughing, no dysphagia, no heartburn, no hoarse voice or no nausea. This is a chronic problem. The current episode started more than 1 year ago. The problem has been unchanged. Pertinent negatives include no fatigue, melena or weight loss. She has tried a PPI for the symptoms. The treatment provided significant relief. Incontinence  This is a chronic problem. The current episode started more than 1 year ago (pt does wear a Depends as she has urine leaking and occ UTIs.). The problem is unchanged. Bladder irritants consumed are caffeine. (pt likes tea with every meal) Incontinence does not negatively affect quality of life. Patient is aware of incontinence. Protection used includes: pads and guards.  Absorbent products are currently damp. Caffeine is consumed daily. Type of incontinence is urge and functional.  Wound Check  She was originally treated more than 14 days ago (sore on buttocks region and pt is using Aquaphor that burns some. Pt wants me to check it today to make sure not an open wound. ). Her temperature was unmeasured prior to arrival. There has been no drainage from the wound. The redness has not changed. The swelling has not changed. Past Medical History:     Past Medical History:   Diagnosis Date    GERD (gastroesophageal reflux disease)     Hyperlipidemia     Hypertension     Urinary incontinence       Reviewed all health maintenance requirements and ordered appropriate tests  Health Maintenance Due   Topic Date Due    Shingles vaccine (1 of 2) Never done    Flu vaccine (1) 08/01/2022       Past Surgical History:     Past Surgical History:   Procedure Laterality Date    BUNIONECTOMY      CHOLECYSTECTOMY, LAPAROSCOPIC  12/26/12    TONSILLECTOMY          Medications:       Prior to Admission medications    Medication Sig Start Date End Date Taking? Authorizing Provider   omeprazole (PRILOSEC) 20 MG delayed release capsule TAKE 1 CAPSULE EVERY DAY 8/22/22  Yes Sara Ag MD   hydroCHLOROthiazide (MICROZIDE) 12.5 MG capsule TAKE 1 CAPSULE EVERY DAY 8/22/22  Yes Sara Ag MD   traZODone (DESYREL) 50 MG tablet Take 1 tablet by mouth nightly 3/10/22  Yes Sara Ag MD   docusate sodium (COLACE) 100 MG capsule Take 100 mg by mouth 2 times daily as needed for Constipation   Yes Historical Provider, MD   acetaminophen (TYLENOL) 325 MG tablet Take 650 mg by mouth every 6 hours as needed for Pain    Yes Historical Provider, MD   furosemide (LASIX) 20 MG tablet Take 20 mg by mouth in the morning.   Patient not taking: Reported on 10/20/2022    Historical Provider, MD   cephALEXin (KEFLEX) 500 MG capsule Take 1 capsule by mouth 2 times daily 5/23/22   Caryle Cella Back, MD   Menthol-Methyl Salicylate (MUSCLE RUB) 10-15 % CREA cream as directed    Historical Provider, MD        Allergies:       Patient has no known allergies. Social History:     Tobacco:    reports that she has never smoked. She has never used smokeless tobacco.  Alcohol:      reports no history of alcohol use. Drug Use:  reports no history of drug use. Family History:     Family History   Problem Relation Age of Onset    Cancer Mother         Leukemia    Diabetes Father        Review of Systems:       Review of Systems   Constitutional:  Negative for chills, fatigue, fever, malaise/fatigue, unexpected weight change and weight loss. HENT:  Negative for hoarse voice. Eyes:  Negative for discharge, redness and visual disturbance. Respiratory:  Negative for cough, choking and shortness of breath. Cardiovascular:  Negative for chest pain and palpitations. Gastrointestinal:  Negative for abdominal pain, blood in stool, diarrhea, dysphagia, heartburn, melena, nausea and vomiting. Genitourinary:  Positive for bladder incontinence. Negative for dysuria and hematuria. Musculoskeletal:  Negative for joint swelling. Skin:  Negative for rash. Pt has sore and red buttocks   Neurological:  Negative for dizziness, tremors, light-headedness and headaches. Psychiatric/Behavioral:  Negative for sleep disturbance. Physical Exam:     Physical Exam  Vitals reviewed. Constitutional:       General: She is not in acute distress. Appearance: Normal appearance. She is well-developed. She is not ill-appearing. HENT:      Head: Normocephalic and atraumatic. Eyes:      General:         Right eye: No discharge. Left eye: No discharge. Conjunctiva/sclera: Conjunctivae normal.   Neck:      Thyroid: No thyromegaly. Vascular: No carotid bruit. Cardiovascular:      Rate and Rhythm: Normal rate and regular rhythm. Heart sounds: Normal heart sounds. No murmur heard.   Pulmonary:      Effort: Pulmonary effort is normal. No respiratory distress. Breath sounds: Normal breath sounds. Abdominal:      General: There is no distension. Palpations: Abdomen is soft. Tenderness: There is no abdominal tenderness. Musculoskeletal:      Cervical back: Neck supple. Right lower leg: No edema. Left lower leg: No edema. Lymphadenopathy:      Cervical: No cervical adenopathy. Skin:     Findings: Erythema and rash present. Comments: A- bilateral buttocks with erythematous thin skin but NO open ulcer. Neurological:      General: No focal deficit present. Mental Status: She is alert and oriented to person, place, and time.    Psychiatric:         Mood and Affect: Mood normal.         Behavior: Behavior normal.       Vitals:  /60   Pulse 74   Wt 175 lb (79.4 kg)   LMP  (LMP Unknown)   SpO2 96%   BMI 27.41 kg/m²       Data:     Lab Results   Component Value Date/Time     05/21/2022 05:13 AM    K 3.7 05/21/2022 05:13 AM     05/21/2022 05:13 AM    CO2 27 05/21/2022 05:13 AM    BUN 23 05/21/2022 05:13 AM    CREATININE 1.10 05/21/2022 05:13 AM    GLUCOSE 132 05/21/2022 05:13 AM    GLUCOSE 103 06/06/2012 08:25 AM    PROT 6.7 05/20/2022 04:58 PM    LABALBU 4.0 05/20/2022 04:58 PM    LABALBU 4.1 06/06/2012 08:25 AM    BILITOT 0.16 05/20/2022 04:58 PM    ALKPHOS 145 05/20/2022 04:58 PM    AST 21 05/20/2022 04:58 PM    ALT 15 05/20/2022 04:58 PM     Lab Results   Component Value Date/Time    WBC 7.1 05/20/2022 04:58 PM    RBC 4.54 05/20/2022 04:58 PM    RBC 4.73 12/14/2011 10:55 AM    HGB 14.0 05/20/2022 04:58 PM    HCT 42.6 05/20/2022 04:58 PM    MCV 93.8 05/20/2022 04:58 PM    MCH 30.9 05/20/2022 04:58 PM    MCHC 32.9 05/20/2022 04:58 PM    RDW 15.6 05/20/2022 04:58 PM     05/20/2022 04:58 PM     12/14/2011 10:55 AM    MPV NOT REPORTED 09/26/2020 05:06 PM     Lab Results   Component Value Date/Time    TSH 2.36 10/25/2012 01:18 PM     Lab Results   Component Value Date/Time CHOL 216 09/12/2016 09:42 AM    HDL 56 09/12/2016 09:42 AM          Assessment/Plan:        1. Essential hypertension, benign  Stable on HCTZ     2. Gastroesophageal reflux disease without esophagitis  Stable prilosec     3. Skin breakdown  Pt told to apply vaseline daily and get up out of chair and walk more and while in chair sonSeema who is with her today will get a donut like cushion for her to sit on . She also likes to sleep in her chair at night and encouraged bed or at least this cushion    4. Urge incontinence  Pt had UA checked in office today as incontinence and then urine had bad odor and concern for UTI so started on CIpro and urine sent to cx    5. Bad odor of urine  See #4  - POCT Urinalysis no Micro      Renuka received counseling on the following healthy behaviors: nutrition and exercise  Reviewed prior labs and health maintenance  Continue current medications, diet and exercise. Discussed use, benefit, and side effects of prescribed medications. Barriers to medication compliance addressed. Patient given educational materials - see patient instructions  Was a self-tracking handout given in paper form or via [facility] [employer]? Yes    Requested Prescriptions      No prescriptions requested or ordered in this encounter       All patient questions answered. Patient voiced understanding. Quality Measures    Body mass index is 27.41 kg/m². Normal. Weight control planned discussed Healthy diet and regular exercise. BP: 110/60. Blood pressure is Normal. Treatment plan consists of No treatment change needed. Fall Risk 4/14/2022 10/14/2021 11/5/2020 5/29/2019 5/24/2018 7/18/2017 9/29/2015   2 or more falls in past year? no no no yes no no no   Fall with injury in past year? no no no yes no no no     The patient does not have a history of falls. I did not - not indicated , complete a risk assessment for falls.  A plan of care for falls No Treatment plan indicated    Lab Results   Component Value Date LDLCHOLESTEROL 138 (H) 09/12/2016    (goal LDL reduction with dx if diabetes is 50% LDL reduction)    PHQ Scores 4/14/2022 10/14/2021 4/13/2021 11/5/2020 5/29/2019 5/24/2018 7/18/2017   PHQ2 Score 0 0 0 0 0 0 0   PHQ9 Score 0 0 0 0 0 0 0     Interpretation of Total Score Depression Severity: 1-4 = Minimal depression, 5-9 = Mild depression, 10-14 = Moderate depression, 15-19 = Moderately severe depression, 20-27 = Severe depression      Return in about 6 months (around 4/20/2023) for HTN, gerd.       Electronically signed by Connie Tucker MD on 10/20/2022 at 1:03 PM

## 2022-10-22 LAB
CULTURE: ABNORMAL
CULTURE: ABNORMAL
SPECIMEN DESCRIPTION: ABNORMAL

## 2022-11-08 ENCOUNTER — TELEPHONE (OUTPATIENT)
Dept: FAMILY MEDICINE CLINIC | Age: 87
End: 2022-11-08

## 2022-11-08 NOTE — TELEPHONE ENCOUNTER
Nacho Goetz, nurse at the [facility] reporting Jed Mandel has pustules on her Rt gluteal cleft with surrounding area dry/red. Questioning if okay to apply Mupirocin ointment BID to lesions until gone? Okay per Dr Gilbert Ham. Mupirocin pending Dr Kely Dinh signature. Kate Quevedo, Pt's son notified.

## 2022-12-27 ENCOUNTER — TELEPHONE (OUTPATIENT)
Dept: FAMILY MEDICINE CLINIC | Age: 87
End: 2022-12-27

## 2022-12-27 RX ORDER — TRIAMCINOLONE ACETONIDE 1 MG/G
CREAM TOPICAL
Qty: 60 G | Refills: 2 | Status: SHIPPED | OUTPATIENT
Start: 2022-12-27

## 2022-12-27 NOTE — TELEPHONE ENCOUNTER
Shirley called from the [facility] stating pt sated she has a \"sore ass\". She used Triamcinolone acetonide on the area and pt stated it stopped the burning. Could be get a RX for this please.

## 2023-01-23 ENCOUNTER — TELEPHONE (OUTPATIENT)
Dept: FAMILY MEDICINE CLINIC | Age: 88
End: 2023-01-23

## 2023-01-23 RX ORDER — ONDANSETRON 4 MG/1
4 TABLET, FILM COATED ORAL 3 TIMES DAILY PRN
Qty: 15 TABLET | Refills: 0 | Status: SHIPPED | OUTPATIENT
Start: 2023-01-23

## 2023-01-23 NOTE — TELEPHONE ENCOUNTER
Call from the nurse at the [facility], stating pt has nausea but no vomiting. She has been laying in bed today too. The stomach bug has been going around the in the [facility] [place] [facility] where pt lives. Ok to call in some Zofran to take as needed to [facility] and pt's son will go pick it up today and bring to the [facility] [place].

## 2023-01-30 ENCOUNTER — TELEPHONE (OUTPATIENT)
Dept: FAMILY MEDICINE CLINIC | Age: 88
End: 2023-01-30

## 2023-01-30 RX ORDER — CLOTRIMAZOLE 1 %
CREAM (GRAM) TOPICAL
Qty: 1 EACH | Refills: 1 | Status: SHIPPED | OUTPATIENT
Start: 2023-01-30 | End: 2023-02-06

## 2023-01-30 NOTE — TELEPHONE ENCOUNTER
Shirley nurse from the [facility]. Renuka c/o rash on bottom. Renuka said Jorge was helping but not any longer. Per Dr Sissy Chan Pt can try Lotimin 1% lotion. Shirley at the [facility] informed of the medication change, Shirley will let Pt's son Yoselin Griggs know there will be a new script to  at [facility].

## 2023-02-09 ENCOUNTER — OFFICE VISIT (OUTPATIENT)
Dept: FAMILY MEDICINE CLINIC | Age: 88
End: 2023-02-09

## 2023-02-09 ENCOUNTER — TELEPHONE (OUTPATIENT)
Dept: FAMILY MEDICINE CLINIC | Age: 88
End: 2023-02-09

## 2023-02-09 ENCOUNTER — HOSPITAL ENCOUNTER (OUTPATIENT)
Age: 88
Setting detail: SPECIMEN
Discharge: HOME | End: 2023-02-09
Payer: MEDICARE

## 2023-02-09 VITALS
DIASTOLIC BLOOD PRESSURE: 70 MMHG | BODY MASS INDEX: 26.94 KG/M2 | HEART RATE: 96 BPM | SYSTOLIC BLOOD PRESSURE: 118 MMHG | OXYGEN SATURATION: 94 % | WEIGHT: 172 LBS

## 2023-02-09 DIAGNOSIS — N39.41 URGE INCONTINENCE: ICD-10-CM

## 2023-02-09 DIAGNOSIS — R23.8 SKIN BREAKDOWN: ICD-10-CM

## 2023-02-09 DIAGNOSIS — N30.01 ACUTE CYSTITIS WITH HEMATURIA: Primary | ICD-10-CM

## 2023-02-09 LAB
BILIRUBIN, POC: NEGATIVE
BLOOD URINE, POC: NORMAL
CLARITY, POC: CLEAR
COLOR, POC: YELLOW
GLUCOSE URINE, POC: NEGATIVE
KETONES, POC: NEGATIVE
LEUKOCYTE EST, POC: NEGATIVE
NITRITE, POC: POSITIVE
PH, POC: 6
PROTEIN, POC: NEGATIVE
SPECIFIC GRAVITY, POC: 1.01
UROBILINOGEN, POC: 0.2

## 2023-02-09 PROCEDURE — 87086 URINE CULTURE/COLONY COUNT: CPT

## 2023-02-09 PROCEDURE — 87186 SC STD MICRODIL/AGAR DIL: CPT

## 2023-02-09 PROCEDURE — 87077 CULTURE AEROBIC IDENTIFY: CPT

## 2023-02-09 PROCEDURE — 87088 URINE BACTERIA CULTURE: CPT

## 2023-02-09 RX ORDER — CIPROFLOXACIN 250 MG/1
250 TABLET, FILM COATED ORAL 2 TIMES DAILY
Qty: 14 TABLET | Refills: 0 | Status: SHIPPED | OUTPATIENT
Start: 2023-02-09 | End: 2023-02-16

## 2023-02-09 SDOH — ECONOMIC STABILITY: INCOME INSECURITY: HOW HARD IS IT FOR YOU TO PAY FOR THE VERY BASICS LIKE FOOD, HOUSING, MEDICAL CARE, AND HEATING?: NOT HARD AT ALL

## 2023-02-09 SDOH — ECONOMIC STABILITY: FOOD INSECURITY: WITHIN THE PAST 12 MONTHS, YOU WORRIED THAT YOUR FOOD WOULD RUN OUT BEFORE YOU GOT MONEY TO BUY MORE.: NEVER TRUE

## 2023-02-09 SDOH — ECONOMIC STABILITY: HOUSING INSECURITY
IN THE LAST 12 MONTHS, WAS THERE A TIME WHEN YOU DID NOT HAVE A STEADY PLACE TO SLEEP OR SLEPT IN A SHELTER (INCLUDING NOW)?: NO

## 2023-02-09 SDOH — ECONOMIC STABILITY: FOOD INSECURITY: WITHIN THE PAST 12 MONTHS, THE FOOD YOU BOUGHT JUST DIDN'T LAST AND YOU DIDN'T HAVE MONEY TO GET MORE.: NEVER TRUE

## 2023-02-09 ASSESSMENT — PATIENT HEALTH QUESTIONNAIRE - PHQ9
SUM OF ALL RESPONSES TO PHQ QUESTIONS 1-9: 1
1. LITTLE INTEREST OR PLEASURE IN DOING THINGS: 0
2. FEELING DOWN, DEPRESSED OR HOPELESS: 1
SUM OF ALL RESPONSES TO PHQ9 QUESTIONS 1 & 2: 1
SUM OF ALL RESPONSES TO PHQ QUESTIONS 1-9: 1

## 2023-02-09 NOTE — TELEPHONE ENCOUNTER
Pt's son called stating pt has a pressure sore on her bottom and thinks pt has a UTI. Pt also suffering from depression.  Scheduled an appointment

## 2023-02-09 NOTE — PROGRESS NOTES
HPI Notes    Name: Allen Peña  : 1924        Chief Complaint:     Chief Complaint   Patient presents with    Altered Mental Status     Nurses at the [facility] and Pt's son have noted patient is more confused and feeing depressed. Wound Check     Pt has non healing sore on Rt side of buttocks. Kenalog is applied twice daily. History of Present Illness:     Allen Peña is a 80 y.o.  female who presents with Altered Mental Status (Nurses at the [facility] and Pt's son have noted patient is more confused and feeing depressed. ) and Wound Check (Pt has non healing sore on Rt side of buttocks. Kenalog is applied twice daily. /)      Altered Mental Status  This is a new (Pt is presnet today with her son who is concern pt may have a UTI since more confused and feeling depressed.) problem. Episode onset: pt has been more confused and depressed. pt has urinary incontinence wears a Depends all the time. Pt doesn't think she \"dribbles\" as she goes to the bathroom all the time on her own. The problem occurs daily. The problem has been gradually worsening. Associated symptoms include a rash. Pertinent negatives include no chills, coughing, fatigue, fever or vomiting. She has tried nothing for the symptoms. Wound Check  Treated in ED: pt has had this sore for months and not getting better. Pt has a donut cushion she sits on it all the time or she gets up and walk. No diarrhea. Pt is staying in her Depnds and not changing when wet. Prior ED Treatment: pt has been applying steroid cream, antibx ointment and currently lotrimin cream. Maximum temperature: no fever. Wound drainage status: occ spotting drainage. There is no swelling present. She has no difficulty moving the affected extremity or digit.      Past Medical History:     Past Medical History:   Diagnosis Date    GERD (gastroesophageal reflux disease)     Hyperlipidemia     Hypertension     Urinary incontinence       Reviewed all health maintenance requirements and ordered appropriate tests  Health Maintenance Due   Topic Date Due    Shingles vaccine (1 of 2) Never done    COVID-19 Vaccine (5 - Booster) 07/19/2022    Flu vaccine (1) 08/01/2022       Past Surgical History:     Past Surgical History:   Procedure Laterality Date    BUNIONECTOMY      CHOLECYSTECTOMY, LAPAROSCOPIC  12/26/12    TONSILLECTOMY          Medications:       Prior to Admission medications    Medication Sig Start Date End Date Taking? Authorizing Provider   ciprofloxacin (CIPRO) 250 MG tablet Take 1 tablet by mouth 2 times daily for 7 days 2/9/23 2/16/23 Yes Dhara Marcos MD   triamcinolone (KENALOG) 0.1 % cream Apply topically 2 times daily. 12/27/22  Yes Dhara Marcos MD   omeprazole (PRILOSEC) 20 MG delayed release capsule TAKE 1 CAPSULE EVERY DAY 8/22/22  Yes Dhara Marcos MD   hydroCHLOROthiazide (MICROZIDE) 12.5 MG capsule TAKE 1 CAPSULE EVERY DAY 8/22/22  Yes Dhara Marcos MD   docusate sodium (COLACE) 100 MG capsule Take 100 mg by mouth 2 times daily as needed for Constipation   Yes Historical Provider, MD   ondansetron (ZOFRAN) 4 MG tablet Take 1 tablet by mouth 3 times daily as needed for Nausea or Vomiting 1/23/23   Dhara Marcos MD   mupirocin (BACTROBAN) 2 % ointment Apply topically 2 times daily. 11/8/22   Dhara Marcos MD   furosemide (LASIX) 20 MG tablet Take 20 mg by mouth in the morning.   Patient not taking: Reported on 10/20/2022    Historical Provider, MD   traZODone (DESYREL) 50 MG tablet Take 1 tablet by mouth nightly  Patient not taking: Reported on 2/9/2023 3/10/22   Dhara Marcos MD   Menthol-Methyl Salicylate (MUSCLE RUB) 10-15 % CREA cream as directed  Patient not taking: Reported on 2/9/2023    Historical Provider, MD   acetaminophen (TYLENOL) 325 MG tablet Take 650 mg by mouth every 6 hours as needed for Pain   Patient not taking: Reported on 2/9/2023    Historical Provider, MD        Allergies:       Patient has no known allergies. Social History:     Tobacco:    reports that she has never smoked. She has never used smokeless tobacco.  Alcohol:      reports no history of alcohol use. Drug Use:  reports no history of drug use. Family History:     Family History   Problem Relation Age of Onset    Cancer Mother         Leukemia    Diabetes Father        Review of Systems:       Review of Systems   Constitutional:  Negative for chills, fatigue and fever. Respiratory:  Negative for cough. Gastrointestinal:  Negative for vomiting. Genitourinary:  Positive for frequency. Negative for difficulty urinating and hematuria. Skin:  Positive for rash. Pt has rash on his buttocks and burns       Physical Exam:     Physical Exam  Vitals reviewed. Constitutional:       General: She is not in acute distress. Appearance: Normal appearance. She is well-developed. She is not ill-appearing. HENT:      Head: Normocephalic and atraumatic. Eyes:      General:         Right eye: No discharge. Left eye: No discharge. Neck:      Thyroid: No thyromegaly. Cardiovascular:      Rate and Rhythm: Normal rate and regular rhythm. Heart sounds: Normal heart sounds. No murmur heard. Pulmonary:      Effort: Pulmonary effort is normal.      Breath sounds: Normal breath sounds. Musculoskeletal:      Cervical back: Neck supple. Right lower leg: No edema. Left lower leg: No edema. Lymphadenopathy:      Cervical: No cervical adenopathy. Skin:     Findings: Rash present. No erythema. Comments: A - upper butt cheeks from the midline crack bilateral and outward with light red patch and no skin ulcer or bleeding   Neurological:      General: No focal deficit present. Mental Status: She is alert.    Psychiatric:         Mood and Affect: Mood normal.         Behavior: Behavior normal.       Vitals:  /70   Pulse 96   Wt 172 lb (78 kg)   LMP  (LMP Unknown)   SpO2 94%   BMI 26.94 kg/m² Data:     Lab Results   Component Value Date/Time     05/21/2022 05:13 AM    K 3.7 05/21/2022 05:13 AM     05/21/2022 05:13 AM    CO2 27 05/21/2022 05:13 AM    BUN 23 05/21/2022 05:13 AM    CREATININE 1.10 05/21/2022 05:13 AM    GLUCOSE 132 05/21/2022 05:13 AM    GLUCOSE 103 06/06/2012 08:25 AM    PROT 6.7 05/20/2022 04:58 PM    LABALBU 4.0 05/20/2022 04:58 PM    LABALBU 4.1 06/06/2012 08:25 AM    BILITOT 0.16 05/20/2022 04:58 PM    ALKPHOS 145 05/20/2022 04:58 PM    AST 21 05/20/2022 04:58 PM    ALT 15 05/20/2022 04:58 PM     Lab Results   Component Value Date/Time    WBC 7.1 05/20/2022 04:58 PM    RBC 4.54 05/20/2022 04:58 PM    RBC 4.73 12/14/2011 10:55 AM    HGB 14.0 05/20/2022 04:58 PM    HCT 42.6 05/20/2022 04:58 PM    MCV 93.8 05/20/2022 04:58 PM    MCH 30.9 05/20/2022 04:58 PM    MCHC 32.9 05/20/2022 04:58 PM    RDW 15.6 05/20/2022 04:58 PM     05/20/2022 04:58 PM     12/14/2011 10:55 AM    MPV NOT REPORTED 09/26/2020 05:06 PM     Lab Results   Component Value Date/Time    TSH 2.36 10/25/2012 01:18 PM     Lab Results   Component Value Date/Time    CHOL 216 09/12/2016 09:42 AM    HDL 56 09/12/2016 09:42 AM          Assessment/Plan:        1. Acute cystitis with hematuria  D/w pt and her son that she has Nitrite in urine so positive for UTI and send urine to Cx but also start Tx with cipro    2. Skin breakdown  Has d/w pt and her son about her leaking urine and the moisture on the skin. Then also talked to nurse at [id number] about pt trying regular cotton underwear with incontinence pad during day and the a Depends at night that is thrown out every AM. Also stop lotrimin and then apply Aquphor BID for barrier protection        Return in about 4 weeks (around 3/9/2023).       Electronically signed by Raj Hurd MD on 2/9/2023 at 11:58 PM

## 2023-02-10 ASSESSMENT — ENCOUNTER SYMPTOMS
COUGH: 0
VOMITING: 0

## 2023-02-12 LAB
MICROORGANISM SPEC CULT: ABNORMAL
MICROORGANISM SPEC CULT: ABNORMAL
SPECIMEN DESCRIPTION: ABNORMAL

## 2023-02-21 ENCOUNTER — TELEPHONE (OUTPATIENT)
Dept: FAMILY MEDICINE CLINIC | Age: 88
End: 2023-02-21

## 2023-02-21 NOTE — TELEPHONE ENCOUNTER
Please tell son I gave order for nurse to check a urine at the [facility] and they will let me know if abnormal.

## 2023-02-21 NOTE — TELEPHONE ENCOUNTER
Renuka donated a urine sample on 2/9. Max Sousa said she had finished the antibiotic and he would like to know if she can just have it rechecked to make sure its gone. He said you can let him and the [facility] know. Have no real symptoms, still a little confusion.     Health Maintenance   Topic Date Due    Shingles vaccine (1 of 2) Never done    COVID-19 Vaccine (5 - Booster) 07/19/2022    Flu vaccine (1) 08/01/2022    Annual Wellness Visit (AWV)  04/15/2023    Depression Screen  02/09/2024    DTaP/Tdap/Td vaccine (2 - Td or Tdap) 06/18/2028    Pneumococcal 65+ years Vaccine  Completed    Hepatitis A vaccine  Aged Out    Hib vaccine  Aged Out    Meningococcal (ACWY) vaccine  Aged Out             (applicable per patient's age: Cancer Screenings, Depression Screening, Fall Risk Screening, Immunizations)    LDL Cholesterol (mg/dL)   Date Value   09/12/2016 138 (H)     AST (U/L)   Date Value   05/20/2022 21     ALT (U/L)   Date Value   05/20/2022 15     BUN (mg/dL)   Date Value   05/21/2022 23      (goal A1C is < 7)   (goal LDL is <100) need 30-50% reduction from baseline     BP Readings from Last 3 Encounters:   02/09/23 118/70   10/20/22 110/60   05/23/22 118/66    (goal /80)      All Future Testing planned in [facility]:  Lab Frequency Next Occurrence       Next Visit Date:  Future Appointments   Date Time Provider Damon Cesar   3/9/2023 10:20 AM MD Lanette Bartlett [facility]   4/20/2023  8:00 AM MD Lanette Bartlett [facility]            Patient Active Problem List:     Esophageal reflux     Diaphragmatic hernia     Essential hypertension, benign     Pure hypercholesterolemia     Cholelithiases     Cholangitis     Endometrial thickening on ultrasound     Cervical stenosis (uterine cervix)     Arthritis of low back     Disease due to severe acute respiratory syndrome coronavirus 2 (SARS-CoV-2)     Acute cystitis without hematuria

## 2023-03-09 ENCOUNTER — HOSPITAL ENCOUNTER (OUTPATIENT)
Age: 88
Setting detail: SPECIMEN
Discharge: HOME | End: 2023-03-09
Payer: MEDICARE

## 2023-03-09 ENCOUNTER — OFFICE VISIT (OUTPATIENT)
Dept: FAMILY MEDICINE CLINIC | Age: 88
End: 2023-03-09

## 2023-03-09 VITALS — HEART RATE: 74 BPM | OXYGEN SATURATION: 96 % | BODY MASS INDEX: 26.31 KG/M2 | WEIGHT: 168 LBS

## 2023-03-09 DIAGNOSIS — N30.00 ACUTE CYSTITIS WITHOUT HEMATURIA: Primary | ICD-10-CM

## 2023-03-09 DIAGNOSIS — R23.8 SKIN BREAKDOWN: ICD-10-CM

## 2023-03-09 LAB
BILIRUBIN, POC: NEGATIVE
BLOOD URINE, POC: NORMAL
CLARITY, POC: CLEAR
COLOR, POC: YELLOW
GLUCOSE URINE, POC: NEGATIVE
KETONES, POC: NEGATIVE
LEUKOCYTE EST, POC: NORMAL
NITRITE, POC: POSITIVE
PH, POC: 6
PROTEIN, POC: NEGATIVE
SPECIFIC GRAVITY, POC: 1.01
UROBILINOGEN, POC: 0.2

## 2023-03-09 PROCEDURE — 87088 URINE BACTERIA CULTURE: CPT

## 2023-03-09 PROCEDURE — 87086 URINE CULTURE/COLONY COUNT: CPT

## 2023-03-09 PROCEDURE — 87186 SC STD MICRODIL/AGAR DIL: CPT

## 2023-03-09 RX ORDER — CIPROFLOXACIN 250 MG/1
250 TABLET, FILM COATED ORAL 2 TIMES DAILY
Qty: 14 TABLET | Refills: 0 | Status: SHIPPED | OUTPATIENT
Start: 2023-03-09 | End: 2023-03-16

## 2023-03-09 ASSESSMENT — ENCOUNTER SYMPTOMS
SHORTNESS OF BREATH: 0
VOMITING: 0
DIARRHEA: 0
ABDOMINAL PAIN: 0
EYE REDNESS: 0
COUGH: 0
EYE DISCHARGE: 0

## 2023-03-09 NOTE — PROGRESS NOTES
HPI Notes    Name: Antonino Cintron  : 1924        Chief Complaint:     Chief Complaint   Patient presents with    Skin Ulcer     Pt presents today for follow up on skin breakdown. Pt states \"her crack is healed\" Pt is no longer using Aquaphor. History of Present Illness:     Antonino Cintron is a 80 y.o.  female who presents with Skin Ulcer (Pt presents today for follow up on skin breakdown. Pt states \"her crack is healed\" Pt is no longer using Aquaphor.)      HPI  Skin breakdown -  pt is here to joel her skin in buttocks to see if healing. Pt states \"her crack is healed\" and is no longer using Aquaphor and using there cotton underwear with pads and not Depends seem to help. UTI - pt is trying to wear regular cotton underwear with pads and not Depends. Pt still has frequency urination and incontinence. Pt does not feel burning of urination or see blood in urine. Pt's son is with her today and would like pt's urine joel'd   Past Medical History:     Past Medical History:   Diagnosis Date    GERD (gastroesophageal reflux disease)     Hyperlipidemia     Hypertension     Urinary incontinence       Reviewed all health maintenance requirements and ordered appropriate tests  Health Maintenance Due   Topic Date Due    Shingles vaccine (1 of 2) Never done    COVID-19 Vaccine (5 - Booster) 2022    Flu vaccine (1) 2022       Past Surgical History:     Past Surgical History:   Procedure Laterality Date    BUNIONECTOMY      CHOLECYSTECTOMY, LAPAROSCOPIC  12    TONSILLECTOMY          Medications:       Prior to Admission medications    Medication Sig Start Date End Date Taking?  Authorizing Provider   ciprofloxacin (CIPRO) 250 MG tablet Take 1 tablet by mouth 2 times daily for 7 days 3/9/23 3/16/23 Yes Mayra Mancera MD   omeprazole (PRILOSEC) 20 MG delayed release capsule TAKE 1 CAPSULE EVERY DAY 22  Yes Mayra Mancera MD   hydroCHLOROthiazide (MICROZIDE) 12.5 MG capsule TAKE 1 CAPSULE EVERY DAY 8/22/22  Yes Julia Sunshine MD   docusate sodium (COLACE) 100 MG capsule Take 100 mg by mouth 2 times daily as needed for Constipation   Yes Historical Provider, MD   ondansetron (ZOFRAN) 4 MG tablet Take 1 tablet by mouth 3 times daily as needed for Nausea or Vomiting  Patient not taking: Reported on 3/9/2023 1/23/23   Julia Sunshine MD   triamcinolone (KENALOG) 0.1 % cream Apply topically 2 times daily. Patient not taking: Reported on 3/9/2023 12/27/22   Julia Sunshine MD   mupirocin [facility]) 2 % ointment Apply topically 2 times daily. 11/8/22   Julia Sunshine MD   furosemide (LASIX) 20 MG tablet Take 20 mg by mouth in the morning. Patient not taking: Reported on 3/9/2023    Historical Provider, MD   traZODone (DESYREL) 50 MG tablet Take 1 tablet by mouth nightly  Patient not taking: No sig reported 3/10/22   Julia Sunshine MD   Menthol-Methyl Salicylate (MUSCLE RUB) 10-15 % CREA cream as directed  Patient not taking: No sig reported    Historical Provider, MD   acetaminophen (TYLENOL) 325 MG tablet Take 650 mg by mouth every 6 hours as needed for Pain   Patient not taking: No sig reported    Historical Provider, MD        Allergies:       Patient has no known allergies. Social History:     Tobacco:    reports that she has never smoked. She has never used smokeless tobacco.  Alcohol:      reports no history of alcohol use. Drug Use:  reports no history of drug use. Family History:     Family History   Problem Relation Age of Onset    Cancer Mother         Leukemia    Diabetes Father        Review of Systems:       Review of Systems   Constitutional:  Negative for chills and fever. Eyes:  Negative for discharge and redness. Respiratory:  Negative for cough and shortness of breath. Cardiovascular:  Negative for chest pain and palpitations. Gastrointestinal:  Negative for abdominal pain, diarrhea and vomiting. Genitourinary:  Positive for frequency.  Negative for difficulty urinating and dysuria. Urine incontinence   Skin:         Skin breakdown at buttocks       Physical Exam:     Physical Exam  Vitals reviewed. Constitutional:       General: She is not in acute distress. Appearance: Normal appearance. She is well-developed. She is not ill-appearing. HENT:      Head: Normocephalic and atraumatic. Eyes:      General:         Right eye: No discharge. Left eye: No discharge. Conjunctiva/sclera: Conjunctivae normal.   Neck:      Thyroid: No thyromegaly. Cardiovascular:      Rate and Rhythm: Normal rate and regular rhythm. Heart sounds: Normal heart sounds. No murmur heard. Pulmonary:      Effort: Pulmonary effort is normal.      Breath sounds: Normal breath sounds. Musculoskeletal:      Cervical back: Neck supple. Lymphadenopathy:      Cervical: No cervical adenopathy. Skin:     Findings: No erythema or rash. Comments: Buttocks area -- no skin breakdown or ulcer or erythema   Neurological:      General: No focal deficit present. Mental Status: She is alert.    Psychiatric:         Mood and Affect: Mood normal.       Vitals:  Pulse 74   Wt 168 lb (76.2 kg)   LMP  (LMP Unknown)   SpO2 96%   BMI 26.31 kg/m²       Data:     Lab Results   Component Value Date/Time     05/21/2022 05:13 AM    K 3.7 05/21/2022 05:13 AM     05/21/2022 05:13 AM    CO2 27 05/21/2022 05:13 AM    BUN 23 05/21/2022 05:13 AM    CREATININE 1.10 05/21/2022 05:13 AM    GLUCOSE 132 05/21/2022 05:13 AM    GLUCOSE 103 06/06/2012 08:25 AM    PROT 6.7 05/20/2022 04:58 PM    LABALBU 4.0 05/20/2022 04:58 PM    LABALBU 4.1 06/06/2012 08:25 AM    BILITOT 0.16 05/20/2022 04:58 PM    ALKPHOS 145 05/20/2022 04:58 PM    AST 21 05/20/2022 04:58 PM    ALT 15 05/20/2022 04:58 PM     Lab Results   Component Value Date/Time    WBC 7.1 05/20/2022 04:58 PM    RBC 4.54 05/20/2022 04:58 PM    RBC 4.73 12/14/2011 10:55 AM    HGB 14.0 05/20/2022 04:58 PM    HCT 42.6 05/20/2022 04:58 PM    MCV 93.8 05/20/2022 04:58 PM    MCH 30.9 05/20/2022 04:58 PM    MCHC 32.9 05/20/2022 04:58 PM    RDW 15.6 05/20/2022 04:58 PM     05/20/2022 04:58 PM     12/14/2011 10:55 AM    MPV NOT REPORTED 09/26/2020 05:06 PM     Lab Results   Component Value Date/Time    TSH 2.36 10/25/2012 01:18 PM     Lab Results   Component Value Date/Time    CHOL 216 09/12/2016 09:42 AM    HDL 56 09/12/2016 09:42 AM          Assessment/Plan:        1. Skin breakdown  Resolved and keep wearing her cotton underwear with pads    2. Acute cystitis without hematuria  Pt to have urine cx completed and in meantime called [facility] and pt started on cipro 250mg one BID for 7d, add cranberry pill one daily and joel UA 5d after completed the antibiotic   - Culture, Urine; Future  - POCT Urinalysis no Micro    Keep April 6mos ck up with me    No follow-ups on file.       Electronically signed by Angel Sánchez MD on 3/9/2023 at 11:40 PM

## 2023-03-11 LAB
MICROORGANISM SPEC CULT: ABNORMAL
SPECIMEN DESCRIPTION: ABNORMAL

## 2023-03-21 ENCOUNTER — HOSPITAL ENCOUNTER (OUTPATIENT)
Age: 88
Setting detail: SPECIMEN
Discharge: HOME | End: 2023-03-21
Payer: MEDICARE

## 2023-03-21 LAB
-: ABNORMAL
BACTERIA: ABNORMAL
BILIRUBIN URINE: NEGATIVE
COLOR: YELLOW
COMMENT UA: ABNORMAL
EPITHELIAL CELLS UA: ABNORMAL /HPF
GLUCOSE UR STRIP.AUTO-MCNC: NEGATIVE MG/DL
KETONES UR STRIP.AUTO-MCNC: NEGATIVE MG/DL
LEUKOCYTE ESTERASE UR QL STRIP.AUTO: NEGATIVE
NITRITE UR QL STRIP.AUTO: NEGATIVE
PROT UR STRIP.AUTO-MCNC: 5 MG/DL (ref 5–8)
PROT UR STRIP.AUTO-MCNC: NEGATIVE MG/DL
RBC CLUMPS #/AREA URNS AUTO: ABNORMAL /HPF (ref 0–2)
SPECIFIC GRAVITY UA: 1.01 (ref 1–1.03)
TURBIDITY: CLEAR
URINE HGB: ABNORMAL
UROBILINOGEN, URINE: NORMAL
WBC UA: ABNORMAL /HPF

## 2023-03-21 PROCEDURE — 81001 URINALYSIS AUTO W/SCOPE: CPT

## 2023-03-21 PROCEDURE — 87086 URINE CULTURE/COLONY COUNT: CPT

## 2023-03-21 PROCEDURE — 86403 PARTICLE AGGLUT ANTBDY SCRN: CPT

## 2023-03-21 PROCEDURE — 87186 SC STD MICRODIL/AGAR DIL: CPT

## 2023-03-22 LAB
MICROORGANISM SPEC CULT: ABNORMAL
SPECIMEN DESCRIPTION: ABNORMAL

## 2023-03-23 ENCOUNTER — TELEPHONE (OUTPATIENT)
Dept: FAMILY MEDICINE CLINIC | Age: 88
End: 2023-03-23

## 2023-03-23 RX ORDER — NITROFURANTOIN 25; 75 MG/1; MG/1
100 CAPSULE ORAL 2 TIMES DAILY
Qty: 20 CAPSULE | Refills: 0 | Status: SHIPPED | OUTPATIENT
Start: 2023-03-23 | End: 2023-04-02

## 2023-03-23 NOTE — TELEPHONE ENCOUNTER
I called the [facility] and told nurse Watson that pt has a UTI and start on Macrobid 100mg one BID for 10d and joel UA and Culture 4d after finish the anitbiotic

## 2023-04-06 ENCOUNTER — HOSPITAL ENCOUNTER (OUTPATIENT)
Age: 88
Setting detail: SPECIMEN
Discharge: HOME | End: 2023-04-06
Payer: MEDICARE

## 2023-04-06 LAB
-: ABNORMAL
BACTERIA: ABNORMAL
BILIRUBIN URINE: NEGATIVE
COLOR: YELLOW
COMMENT UA: ABNORMAL
EPITHELIAL CELLS UA: ABNORMAL /HPF
GLUCOSE UR STRIP.AUTO-MCNC: NEGATIVE MG/DL
KETONES UR STRIP.AUTO-MCNC: NEGATIVE MG/DL
LEUKOCYTE ESTERASE UR QL STRIP.AUTO: ABNORMAL
NITRITE UR QL STRIP.AUTO: POSITIVE
PROT UR STRIP.AUTO-MCNC: 6 MG/DL (ref 5–8)
PROT UR STRIP.AUTO-MCNC: ABNORMAL MG/DL
RBC CLUMPS #/AREA URNS AUTO: ABNORMAL /HPF (ref 0–2)
SPECIFIC GRAVITY UA: 1.01 (ref 1–1.03)
TURBIDITY: ABNORMAL
URINE HGB: ABNORMAL
UROBILINOGEN, URINE: NORMAL
WBC UA: ABNORMAL /HPF

## 2023-04-06 PROCEDURE — 87186 SC STD MICRODIL/AGAR DIL: CPT

## 2023-04-06 PROCEDURE — 81001 URINALYSIS AUTO W/SCOPE: CPT

## 2023-04-06 PROCEDURE — 87086 URINE CULTURE/COLONY COUNT: CPT

## 2023-04-06 PROCEDURE — 87077 CULTURE AEROBIC IDENTIFY: CPT

## 2023-04-08 ENCOUNTER — TELEPHONE (OUTPATIENT)
Dept: FAMILY MEDICINE CLINIC | Age: 88
End: 2023-04-08

## 2023-04-08 LAB
MICROORGANISM SPEC CULT: ABNORMAL
MICROORGANISM SPEC CULT: ABNORMAL
SPECIMEN DESCRIPTION: ABNORMAL

## 2023-04-08 RX ORDER — CIPROFLOXACIN 250 MG/1
250 TABLET, FILM COATED ORAL 2 TIMES DAILY
Qty: 20 TABLET | Refills: 0 | Status: SHIPPED | OUTPATIENT
Start: 2023-04-08 | End: 2023-04-18

## 2023-04-08 NOTE — TELEPHONE ENCOUNTER
Called [facility] and nurse told to give pt cipro 250mg one BID for 10d and joel UA and C and S 2d after finish anitbxs

## 2023-04-20 ENCOUNTER — OFFICE VISIT (OUTPATIENT)
Dept: FAMILY MEDICINE CLINIC | Age: 88
End: 2023-04-20
Payer: MEDICARE

## 2023-04-20 VITALS
BODY MASS INDEX: 26.65 KG/M2 | WEIGHT: 169.8 LBS | SYSTOLIC BLOOD PRESSURE: 118 MMHG | OXYGEN SATURATION: 95 % | HEART RATE: 78 BPM | DIASTOLIC BLOOD PRESSURE: 60 MMHG | HEIGHT: 67 IN

## 2023-04-20 DIAGNOSIS — I10 ESSENTIAL HYPERTENSION, BENIGN: Primary | ICD-10-CM

## 2023-04-20 DIAGNOSIS — K21.9 GASTROESOPHAGEAL REFLUX DISEASE WITHOUT ESOPHAGITIS: ICD-10-CM

## 2023-04-20 DIAGNOSIS — N39.0 RECURRENT URINARY TRACT INFECTION: ICD-10-CM

## 2023-04-20 PROCEDURE — 1090F PRES/ABSN URINE INCON ASSESS: CPT | Performed by: FAMILY MEDICINE

## 2023-04-20 PROCEDURE — 1123F ACP DISCUSS/DSCN MKR DOCD: CPT | Performed by: FAMILY MEDICINE

## 2023-04-20 PROCEDURE — G8417 CALC BMI ABV UP PARAM F/U: HCPCS | Performed by: FAMILY MEDICINE

## 2023-04-20 PROCEDURE — G8427 DOCREV CUR MEDS BY ELIG CLIN: HCPCS | Performed by: FAMILY MEDICINE

## 2023-04-20 PROCEDURE — 1036F TOBACCO NON-USER: CPT | Performed by: FAMILY MEDICINE

## 2023-04-20 PROCEDURE — 99213 OFFICE O/P EST LOW 20 MIN: CPT | Performed by: FAMILY MEDICINE

## 2023-04-20 ASSESSMENT — ENCOUNTER SYMPTOMS
VOMITING: 0
EYE REDNESS: 0
COUGH: 0
ABDOMINAL PAIN: 0
SHORTNESS OF BREATH: 0
BLOOD IN STOOL: 0
EYE DISCHARGE: 0
NAUSEA: 0
DIARRHEA: 0
CONSTIPATION: 0

## 2023-04-20 NOTE — PATIENT INSTRUCTIONS
SURVEY:    You may be receiving a survey from [employer] regarding your visit today. Please complete the survey to enable us to provide the highest quality of care to you and your family. If you cannot score us a very good on any question, please call the office to discuss how we could of made your experience a very good one. Thank you.

## 2023-04-20 NOTE — PROGRESS NOTES
oriented to person, place, and time. Psychiatric:         Mood and Affect: Mood normal.         Behavior: Behavior normal.       Vitals:  /60 (Site: Left Upper Arm, Position: Sitting, Cuff Size: Medium Adult)   Pulse 78   Ht 5' 7\" (1.702 m)   Wt 169 lb 12.8 oz (77 kg)   LMP  (LMP Unknown)   SpO2 95%   BMI 26.59 kg/m²       Data:     Lab Results   Component Value Date/Time     05/21/2022 05:13 AM    K 3.7 05/21/2022 05:13 AM     05/21/2022 05:13 AM    CO2 27 05/21/2022 05:13 AM    BUN 23 05/21/2022 05:13 AM    CREATININE 1.10 05/21/2022 05:13 AM    GLUCOSE 132 05/21/2022 05:13 AM    GLUCOSE 103 06/06/2012 08:25 AM    PROT 6.7 05/20/2022 04:58 PM    LABALBU 4.0 05/20/2022 04:58 PM    LABALBU 4.1 06/06/2012 08:25 AM    BILITOT 0.16 05/20/2022 04:58 PM    ALKPHOS 145 05/20/2022 04:58 PM    AST 21 05/20/2022 04:58 PM    ALT 15 05/20/2022 04:58 PM     Lab Results   Component Value Date/Time    WBC 7.1 05/20/2022 04:58 PM    RBC 4.54 05/20/2022 04:58 PM    RBC 4.73 12/14/2011 10:55 AM    HGB 14.0 05/20/2022 04:58 PM    HCT 42.6 05/20/2022 04:58 PM    MCV 93.8 05/20/2022 04:58 PM    MCH 30.9 05/20/2022 04:58 PM    MCHC 32.9 05/20/2022 04:58 PM    RDW 15.6 05/20/2022 04:58 PM     05/20/2022 04:58 PM     12/14/2011 10:55 AM    MPV NOT REPORTED 09/26/2020 05:06 PM     Lab Results   Component Value Date/Time    TSH 2.36 10/25/2012 01:18 PM     Lab Results   Component Value Date/Time    CHOL 216 09/12/2016 09:42 AM    HDL 56 09/12/2016 09:42 AM          Assessment/Plan:        1. Essential hypertension, benign  Stable on HCTZ     2. Gastroesophageal reflux disease without esophagitis  Stable on prilosec     3. Recurrent urinary tract infection  Repeat UA and culture -- will give order and monitor.  Pt told to change her pad every time she goes to the bathroom        Renuka received counseling on the following healthy behaviors: nutrition and exercise  Reviewed prior labs and health

## 2023-04-21 ENCOUNTER — HOSPITAL ENCOUNTER (OUTPATIENT)
Age: 88
Setting detail: SPECIMEN
Discharge: HOME | End: 2023-04-21

## 2023-04-21 DIAGNOSIS — N30.00 ACUTE CYSTITIS WITHOUT HEMATURIA: ICD-10-CM

## 2023-04-21 LAB
-: NORMAL
BILIRUBIN URINE: NEGATIVE
COLOR: YELLOW
COMMENT UA: ABNORMAL
EPITHELIAL CELLS UA: NORMAL /HPF
GLUCOSE UR STRIP.AUTO-MCNC: NEGATIVE MG/DL
KETONES UR STRIP.AUTO-MCNC: NEGATIVE MG/DL
LEUKOCYTE ESTERASE UR QL STRIP.AUTO: NEGATIVE
NITRITE UR QL STRIP.AUTO: NEGATIVE
PROT UR STRIP.AUTO-MCNC: 6 MG/DL (ref 5–8)
PROT UR STRIP.AUTO-MCNC: ABNORMAL MG/DL
RBC CLUMPS #/AREA URNS AUTO: NORMAL /HPF (ref 0–2)
SPECIFIC GRAVITY UA: 1.01 (ref 1–1.03)
TURBIDITY: CLEAR
URINE HGB: ABNORMAL
UROBILINOGEN, URINE: NORMAL
WBC UA: NORMAL /HPF

## 2023-04-21 RX ORDER — OMEPRAZOLE 20 MG/1
CAPSULE, DELAYED RELEASE ORAL
Qty: 90 CAPSULE | Refills: 1 | Status: SHIPPED | OUTPATIENT
Start: 2023-04-21

## 2023-04-21 RX ORDER — HYDROCHLOROTHIAZIDE 12.5 MG/1
CAPSULE, GELATIN COATED ORAL
Qty: 90 CAPSULE | Refills: 1 | Status: SHIPPED | OUTPATIENT
Start: 2023-04-21

## 2023-04-21 NOTE — TELEPHONE ENCOUNTER
Last OV: 4/20/2023   chronic   Last RX:    Next scheduled apt: 9/26/2023  AWV        Surescript requesting a refill

## 2023-04-22 LAB
MICROORGANISM SPEC CULT: NORMAL
SPECIMEN DESCRIPTION: NORMAL

## 2023-05-15 ENCOUNTER — TELEPHONE (OUTPATIENT)
Dept: FAMILY MEDICINE CLINIC | Age: 88
End: 2023-05-15

## 2023-05-15 NOTE — TELEPHONE ENCOUNTER
[facility] called stating that pt has had a sore throat and PND X 2 days pt is requesting cough drops and Shirley thinks she may benefit from Claritin. Can the pt get an order for these two items?  Please advise

## 2023-05-15 NOTE — TELEPHONE ENCOUNTER
I called the [facility] and spoke with nurse Watson. Pt has no fever and eating well. Overall, pt acting her normal self. So, ordered pt to try cough drops every 1hr as needed while awake. The nurse will call if pt gets worse or not better.

## 2023-08-31 NOTE — PROGRESS NOTES
No care due was identified.  [facility] Embedded Care Due Messages. Reference number: [id number].   8/31/2023 2:16:11 PM CDT   Phone: [phone]      Fax: [phone]                            Outpatient Physical Therapy                                                                            Daily Note    Date: 2017  Patient Name: Joselito Salmon        MRN: [id number]   ACCT#:  [de-identified]  : 1924  (80 y.o.)    Referring Practitioner: Dr Alexey Pompa    Referral Date : 10/25/17    Diagnosis: Paresthesia left leg  Treatment Diagnosis: left leg pain, back pain    Onset Date: 17  PT Insurance Information: [facility]  Total # of Visits Approved: 10 Per Physician Order  Total # of Visits to Date: 2    Pre-Treatment Pain:  3/10     Assessment  Assessment: No pain for 3 days after last visit, then pain started back up. Pain 3/10 today left leg. Educated patient on proper body mechanics and worked on squat and  cones. Patient able to keep good form with squat with use of one hand on // bar for support. .  Chart Reviewed: Yes    Plan  Plan: Continue with current plan    Exercises/Modalities/Manual:  See DocFlow Sheet    Education:         Goals  (Total # of Visits to Date: 2)   Short Term Goals - Time Frame for Short term goals: 8  Short term goal 1: Patient to be independent with HEP for low back stretches and trunk stability  Short term goal 2:  Increase strength left hip abd 4+/5  Short term goal 3: Patient to be able to squat and  5# object from floor safely without LOB x10          Long Term Goals - Time Frame for Long term goals : 10  Long term goal 1: Decrease pain 2/10 left leg x 3 days  Long term goal 2: Improve mobility with score of 40/80 or better on LEFS  Long term goal 3: Patient to be able to alternate feet up and down stairs safely          Post Treatment Pain:  0/10    Time In: 15:02    Time Out : 15:42        Timed Code Treatment Minutes: 40 Minutes  Total Treatment Time: 2 [facility] License Number: [id number]    Date: 2017

## 2023-09-18 ENCOUNTER — OFFICE VISIT (OUTPATIENT)
Dept: FAMILY MEDICINE CLINIC | Age: 88
End: 2023-09-18
Payer: MEDICARE

## 2023-09-18 VITALS
SYSTOLIC BLOOD PRESSURE: 120 MMHG | BODY MASS INDEX: 27.15 KG/M2 | WEIGHT: 173 LBS | TEMPERATURE: 98.3 F | HEIGHT: 67 IN | HEART RATE: 70 BPM | DIASTOLIC BLOOD PRESSURE: 52 MMHG | OXYGEN SATURATION: 97 %

## 2023-09-18 DIAGNOSIS — J06.9 VIRAL URI WITH COUGH: Primary | ICD-10-CM

## 2023-09-18 PROBLEM — N30.00 ACUTE CYSTITIS WITHOUT HEMATURIA: Status: RESOLVED | Noted: 2022-05-20 | Resolved: 2023-09-18

## 2023-09-18 PROCEDURE — 1090F PRES/ABSN URINE INCON ASSESS: CPT | Performed by: FAMILY MEDICINE

## 2023-09-18 PROCEDURE — 1123F ACP DISCUSS/DSCN MKR DOCD: CPT | Performed by: FAMILY MEDICINE

## 2023-09-18 PROCEDURE — G8417 CALC BMI ABV UP PARAM F/U: HCPCS | Performed by: FAMILY MEDICINE

## 2023-09-18 PROCEDURE — 99213 OFFICE O/P EST LOW 20 MIN: CPT | Performed by: FAMILY MEDICINE

## 2023-09-18 PROCEDURE — G8427 DOCREV CUR MEDS BY ELIG CLIN: HCPCS | Performed by: FAMILY MEDICINE

## 2023-09-18 PROCEDURE — 1036F TOBACCO NON-USER: CPT | Performed by: FAMILY MEDICINE

## 2023-09-18 RX ORDER — BENZONATATE 100 MG/1
100 CAPSULE ORAL 3 TIMES DAILY PRN
Qty: 30 CAPSULE | Refills: 0 | Status: SHIPPED | OUTPATIENT
Start: 2023-09-18

## 2023-09-18 NOTE — PATIENT INSTRUCTIONS
Press [employer]ma SURVEY:    You may be receiving a survey from [employer] regarding your visit today. You may get this in the mail, through your MyChart or in your email. Please complete the survey to enable us to provide the highest quality of care to you and your family. If you cannot score us as very good ( 5 Stars) on any question, please feel free to call the office to discuss how we could have made your experience exceptional.     Thank you.     Clinical Care Team:   Dr. Earl Brito, [address] [facility], [address]                                     Triage: Maninder Craft, [address] [facility] Team:    [address] [facility]                                      Chantelle Sanders

## 2023-09-23 SDOH — HEALTH STABILITY: PHYSICAL HEALTH: ON AVERAGE, HOW MANY MINUTES DO YOU ENGAGE IN EXERCISE AT THIS LEVEL?: 20 MIN

## 2023-09-23 SDOH — HEALTH STABILITY: PHYSICAL HEALTH: ON AVERAGE, HOW MANY DAYS PER WEEK DO YOU ENGAGE IN MODERATE TO STRENUOUS EXERCISE (LIKE A BRISK WALK)?: 5 DAYS

## 2023-09-23 ASSESSMENT — PATIENT HEALTH QUESTIONNAIRE - PHQ9
3. TROUBLE FALLING OR STAYING ASLEEP: 3
SUM OF ALL RESPONSES TO PHQ QUESTIONS 1-9: 18
10. IF YOU CHECKED OFF ANY PROBLEMS, HOW DIFFICULT HAVE THESE PROBLEMS MADE IT FOR YOU TO DO YOUR WORK, TAKE CARE OF THINGS AT HOME, OR GET ALONG WITH OTHER PEOPLE: 1
SUM OF ALL RESPONSES TO PHQ QUESTIONS 1-9: 20
2. FEELING DOWN, DEPRESSED OR HOPELESS: 3
7. TROUBLE CONCENTRATING ON THINGS, SUCH AS READING THE NEWSPAPER OR WATCHING TELEVISION: 2
SUM OF ALL RESPONSES TO PHQ QUESTIONS 1-9: 20
8. MOVING OR SPEAKING SO SLOWLY THAT OTHER PEOPLE COULD HAVE NOTICED. OR THE OPPOSITE, BEING SO FIGETY OR RESTLESS THAT YOU HAVE BEEN MOVING AROUND A LOT MORE THAN USUAL: 1
4. FEELING TIRED OR HAVING LITTLE ENERGY: 2
1. LITTLE INTEREST OR PLEASURE IN DOING THINGS: 2
9. THOUGHTS THAT YOU WOULD BE BETTER OFF DEAD, OR OF HURTING YOURSELF: 2
6. FEELING BAD ABOUT YOURSELF - OR THAT YOU ARE A FAILURE OR HAVE LET YOURSELF OR YOUR FAMILY DOWN: 2
SUM OF ALL RESPONSES TO PHQ9 QUESTIONS 1 & 2: 5
SUM OF ALL RESPONSES TO PHQ QUESTIONS 1-9: 20
5. POOR APPETITE OR OVEREATING: 3

## 2023-09-23 ASSESSMENT — LIFESTYLE VARIABLES
HOW OFTEN DO YOU HAVE SIX OR MORE DRINKS ON ONE OCCASION: 1
HOW OFTEN DO YOU HAVE A DRINK CONTAINING ALCOHOL: NEVER
HOW OFTEN DO YOU HAVE A DRINK CONTAINING ALCOHOL: 1
HOW MANY STANDARD DRINKS CONTAINING ALCOHOL DO YOU HAVE ON A TYPICAL DAY: PATIENT DOES NOT DRINK
HOW MANY STANDARD DRINKS CONTAINING ALCOHOL DO YOU HAVE ON A TYPICAL DAY: 0

## 2023-09-26 ENCOUNTER — OFFICE VISIT (OUTPATIENT)
Dept: FAMILY MEDICINE CLINIC | Age: 88
End: 2023-09-26

## 2023-09-26 VITALS
BODY MASS INDEX: 27.2 KG/M2 | HEART RATE: 65 BPM | WEIGHT: 173.3 LBS | OXYGEN SATURATION: 93 % | RESPIRATION RATE: 18 BRPM | DIASTOLIC BLOOD PRESSURE: 68 MMHG | SYSTOLIC BLOOD PRESSURE: 110 MMHG | HEIGHT: 67 IN

## 2023-09-26 DIAGNOSIS — Z00.00 MEDICARE ANNUAL WELLNESS VISIT, SUBSEQUENT: Primary | ICD-10-CM

## 2023-09-26 DIAGNOSIS — K21.9 GASTROESOPHAGEAL REFLUX DISEASE WITHOUT ESOPHAGITIS: ICD-10-CM

## 2023-09-26 DIAGNOSIS — T30.0 FIRST DEGREE BURN: ICD-10-CM

## 2023-09-26 DIAGNOSIS — F34.1 DYSTHYMIA: ICD-10-CM

## 2023-09-26 DIAGNOSIS — I10 ESSENTIAL HYPERTENSION, BENIGN: ICD-10-CM

## 2023-09-26 DIAGNOSIS — Z87.440 HISTORY OF RECURRENT UTI (URINARY TRACT INFECTION): ICD-10-CM

## 2023-09-26 PROBLEM — C44.319 BASAL CELL CARCINOMA, FOREHEAD: Status: ACTIVE | Noted: 2023-06-19

## 2023-09-26 RX ORDER — ESCITALOPRAM OXALATE 5 MG/1
5 TABLET ORAL DAILY
Qty: 30 TABLET | Refills: 1 | Status: SHIPPED | OUTPATIENT
Start: 2023-09-26

## 2023-09-26 ASSESSMENT — ENCOUNTER SYMPTOMS
EYE REDNESS: 0
CONSTIPATION: 0
DIARRHEA: 0
BURN: 1
COUGH: 0
BLOOD IN STOOL: 0
HEARTBURN: 0
EYE DISCHARGE: 0
VOMITING: 0
SORE THROAT: 0
CHOKING: 0
ABDOMINAL PAIN: 0
SHORTNESS OF BREATH: 0
NAUSEA: 0

## 2023-09-26 ASSESSMENT — PATIENT HEALTH QUESTIONNAIRE - PHQ9
3. TROUBLE FALLING OR STAYING ASLEEP: 0
4. FEELING TIRED OR HAVING LITTLE ENERGY: 1
5. POOR APPETITE OR OVEREATING: 0
9. THOUGHTS THAT YOU WOULD BE BETTER OFF DEAD, OR OF HURTING YOURSELF: 0
SUM OF ALL RESPONSES TO PHQ9 QUESTIONS 1 & 2: 0
8. MOVING OR SPEAKING SO SLOWLY THAT OTHER PEOPLE COULD HAVE NOTICED. OR THE OPPOSITE, BEING SO FIGETY OR RESTLESS THAT YOU HAVE BEEN MOVING AROUND A LOT MORE THAN USUAL: 0
6. FEELING BAD ABOUT YOURSELF - OR THAT YOU ARE A FAILURE OR HAVE LET YOURSELF OR YOUR FAMILY DOWN: 0
7. TROUBLE CONCENTRATING ON THINGS, SUCH AS READING THE NEWSPAPER OR WATCHING TELEVISION: 0
SUM OF ALL RESPONSES TO PHQ QUESTIONS 1-9: 1
2. FEELING DOWN, DEPRESSED OR HOPELESS: 0
SUM OF ALL RESPONSES TO PHQ QUESTIONS 1-9: 1
1. LITTLE INTEREST OR PLEASURE IN DOING THINGS: 0
SUM OF ALL RESPONSES TO PHQ QUESTIONS 1-9: 1
SUM OF ALL RESPONSES TO PHQ QUESTIONS 1-9: 1

## 2023-09-26 ASSESSMENT — LIFESTYLE VARIABLES
HOW OFTEN DO YOU HAVE A DRINK CONTAINING ALCOHOL: 2-4 TIMES A MONTH
HOW MANY STANDARD DRINKS CONTAINING ALCOHOL DO YOU HAVE ON A TYPICAL DAY: 1 OR 2
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
## 2023-10-24 ENCOUNTER — OFFICE VISIT (OUTPATIENT)
Dept: FAMILY MEDICINE CLINIC | Age: 88
End: 2023-10-24
Payer: MEDICARE

## 2023-10-24 VITALS
BODY MASS INDEX: 28.43 KG/M2 | OXYGEN SATURATION: 95 % | SYSTOLIC BLOOD PRESSURE: 112 MMHG | HEART RATE: 68 BPM | DIASTOLIC BLOOD PRESSURE: 62 MMHG | HEIGHT: 68 IN | WEIGHT: 187.6 LBS

## 2023-10-24 DIAGNOSIS — F34.1 DYSTHYMIA: Primary | ICD-10-CM

## 2023-10-24 PROCEDURE — G8484 FLU IMMUNIZE NO ADMIN: HCPCS | Performed by: FAMILY MEDICINE

## 2023-10-24 PROCEDURE — 99213 OFFICE O/P EST LOW 20 MIN: CPT | Performed by: FAMILY MEDICINE

## 2023-10-24 PROCEDURE — 1090F PRES/ABSN URINE INCON ASSESS: CPT | Performed by: FAMILY MEDICINE

## 2023-10-24 PROCEDURE — G8427 DOCREV CUR MEDS BY ELIG CLIN: HCPCS | Performed by: FAMILY MEDICINE

## 2023-10-24 PROCEDURE — 1123F ACP DISCUSS/DSCN MKR DOCD: CPT | Performed by: FAMILY MEDICINE

## 2023-10-24 PROCEDURE — G8417 CALC BMI ABV UP PARAM F/U: HCPCS | Performed by: FAMILY MEDICINE

## 2023-10-24 PROCEDURE — 1036F TOBACCO NON-USER: CPT | Performed by: FAMILY MEDICINE

## 2023-10-24 RX ORDER — ESCITALOPRAM OXALATE 5 MG/1
5 TABLET ORAL DAILY
Qty: 90 TABLET | Refills: 1 | Status: SHIPPED | OUTPATIENT
Start: 2023-10-24

## 2023-10-24 ASSESSMENT — ENCOUNTER SYMPTOMS
ABDOMINAL PAIN: 0
VOMITING: 0
DIARRHEA: 0

## 2023-10-24 NOTE — PROGRESS NOTES
HPI Notes    Name: King Jessie  : 1924        Chief Complaint:     Chief Complaint   Patient presents with    dysthymia     Care takers stated pt is doing much better     Pt  stated she doesn't know how she has gained 14 lbs since she was here last        History of Present Illness:     King Jessie is a 80 y.o.  female who presents with dysthymia (Care takers stated pt is doing much better //Pt  stated she doesn't know how she has gained 14 lbs since she was here last )      Mental Health Problem  The primary symptoms do not include dysphoric mood, delusions, hallucinations or disorganized speech. Primary symptoms comment: pt is with her son today and he has talked to the nurses at the [facility] assisted living too and all agree pt is doing better. Her moods are better and not as bitey on the lexapro. . The current episode started more than 1 month ago (Pt doing better on the lexapro and eating better. So, pt notes increase weight on the medication. ). This is a chronic problem. The degree of incapacity that she is experiencing as a consequence of her illness is mild. Additional symptoms of the illness include appetite change and unexpected weight change. Additional symptoms of the illness do not include insomnia, headaches or abdominal pain.        Past Medical History:     Past Medical History:   Diagnosis Date    GERD (gastroesophageal reflux disease)     Hyperlipidemia     Hypertension     Urinary incontinence       Reviewed all health maintenance requirements and ordered appropriate tests  Health Maintenance Due   Topic Date Due    Shingles vaccine (1 of 2) Never done    COVID-19 Vaccine (7 - Mixed Product series) 2022    Flu vaccine (1) 2023       Past Surgical History:     Past Surgical History:   Procedure Laterality Date    BUNIONECTOMY      CHOLECYSTECTOMY, LAPAROSCOPIC  12    TONSILLECTOMY          Medications:       Prior to Admission medications    Medication Sig

## 2024-03-18 RX ORDER — ESCITALOPRAM OXALATE 5 MG/1
5 TABLET ORAL DAILY
Qty: 90 TABLET | Refills: 3 | Status: SHIPPED | OUTPATIENT
Start: 2024-03-18

## 2024-03-26 ENCOUNTER — OFFICE VISIT (OUTPATIENT)
Dept: FAMILY MEDICINE CLINIC | Age: 89
End: 2024-03-26
Payer: MEDICARE

## 2024-03-26 VITALS
SYSTOLIC BLOOD PRESSURE: 112 MMHG | DIASTOLIC BLOOD PRESSURE: 62 MMHG | WEIGHT: 187 LBS | HEART RATE: 68 BPM | OXYGEN SATURATION: 95 % | BODY MASS INDEX: 28.43 KG/M2

## 2024-03-26 DIAGNOSIS — F34.1 DYSTHYMIA: ICD-10-CM

## 2024-03-26 DIAGNOSIS — K21.9 GASTROESOPHAGEAL REFLUX DISEASE WITHOUT ESOPHAGITIS: ICD-10-CM

## 2024-03-26 DIAGNOSIS — I10 ESSENTIAL HYPERTENSION, BENIGN: Primary | ICD-10-CM

## 2024-03-26 PROCEDURE — 99213 OFFICE O/P EST LOW 20 MIN: CPT | Performed by: FAMILY MEDICINE

## 2024-03-26 PROCEDURE — 1036F TOBACCO NON-USER: CPT | Performed by: FAMILY MEDICINE

## 2024-03-26 PROCEDURE — 1090F PRES/ABSN URINE INCON ASSESS: CPT | Performed by: FAMILY MEDICINE

## 2024-03-26 PROCEDURE — 1123F ACP DISCUSS/DSCN MKR DOCD: CPT | Performed by: FAMILY MEDICINE

## 2024-03-26 PROCEDURE — G8417 CALC BMI ABV UP PARAM F/U: HCPCS | Performed by: FAMILY MEDICINE

## 2024-03-26 PROCEDURE — G8484 FLU IMMUNIZE NO ADMIN: HCPCS | Performed by: FAMILY MEDICINE

## 2024-03-26 PROCEDURE — G8427 DOCREV CUR MEDS BY ELIG CLIN: HCPCS | Performed by: FAMILY MEDICINE

## 2024-03-26 RX ORDER — LIDOCAINE 50 MG/G
1 PATCH TOPICAL DAILY
COMMUNITY

## 2024-03-26 SDOH — ECONOMIC STABILITY: FOOD INSECURITY: WITHIN THE PAST 12 MONTHS, THE FOOD YOU BOUGHT JUST DIDN'T LAST AND YOU DIDN'T HAVE MONEY TO GET MORE.: NEVER TRUE

## 2024-03-26 SDOH — ECONOMIC STABILITY: FOOD INSECURITY: WITHIN THE PAST 12 MONTHS, YOU WORRIED THAT YOUR FOOD WOULD RUN OUT BEFORE YOU GOT MONEY TO BUY MORE.: NEVER TRUE

## 2024-03-26 SDOH — ECONOMIC STABILITY: INCOME INSECURITY: HOW HARD IS IT FOR YOU TO PAY FOR THE VERY BASICS LIKE FOOD, HOUSING, MEDICAL CARE, AND HEATING?: NOT HARD AT ALL

## 2024-03-26 ASSESSMENT — PATIENT HEALTH QUESTIONNAIRE - PHQ9
6. FEELING BAD ABOUT YOURSELF - OR THAT YOU ARE A FAILURE OR HAVE LET YOURSELF OR YOUR FAMILY DOWN: NOT AT ALL
1. LITTLE INTEREST OR PLEASURE IN DOING THINGS: NOT AT ALL
7. TROUBLE CONCENTRATING ON THINGS, SUCH AS READING THE NEWSPAPER OR WATCHING TELEVISION: NOT AT ALL
SUM OF ALL RESPONSES TO PHQ QUESTIONS 1-9: 0
1. LITTLE INTEREST OR PLEASURE IN DOING THINGS: NOT AT ALL
2. FEELING DOWN, DEPRESSED OR HOPELESS: NOT AT ALL
SUM OF ALL RESPONSES TO PHQ QUESTIONS 1-9: 0
2. FEELING DOWN, DEPRESSED OR HOPELESS: NOT AT ALL
SUM OF ALL RESPONSES TO PHQ QUESTIONS 1-9: 0
SUM OF ALL RESPONSES TO PHQ QUESTIONS 1-9: 0
SUM OF ALL RESPONSES TO PHQ9 QUESTIONS 1 & 2: 0
SUM OF ALL RESPONSES TO PHQ QUESTIONS 1-9: 0
4. FEELING TIRED OR HAVING LITTLE ENERGY: NOT AT ALL
3. TROUBLE FALLING OR STAYING ASLEEP: NOT AT ALL
9. THOUGHTS THAT YOU WOULD BE BETTER OFF DEAD, OR OF HURTING YOURSELF: NOT AT ALL
5. POOR APPETITE OR OVEREATING: NOT AT ALL
SUM OF ALL RESPONSES TO PHQ QUESTIONS 1-9: 0
8. MOVING OR SPEAKING SO SLOWLY THAT OTHER PEOPLE COULD HAVE NOTICED. OR THE OPPOSITE, BEING SO FIGETY OR RESTLESS THAT YOU HAVE BEEN MOVING AROUND A LOT MORE THAN USUAL: NOT AT ALL
10. IF YOU CHECKED OFF ANY PROBLEMS, HOW DIFFICULT HAVE THESE PROBLEMS MADE IT FOR YOU TO DO YOUR WORK, TAKE CARE OF THINGS AT HOME, OR GET ALONG WITH OTHER PEOPLE: NOT DIFFICULT AT ALL
SUM OF ALL RESPONSES TO PHQ9 QUESTIONS 1 & 2: 0
SUM OF ALL RESPONSES TO PHQ QUESTIONS 1-9: 0
SUM OF ALL RESPONSES TO PHQ QUESTIONS 1-9: 0

## 2024-03-26 ASSESSMENT — ENCOUNTER SYMPTOMS
EYE DISCHARGE: 0
HEARTBURN: 0
ABDOMINAL PAIN: 0
EYE REDNESS: 0
CONSTIPATION: 0
BELCHING: 0
SHORTNESS OF BREATH: 0
VOMITING: 0
NAUSEA: 0
CHOKING: 0
DIARRHEA: 0
COUGH: 0
BLOOD IN STOOL: 0

## 2024-03-26 NOTE — PATIENT INSTRUCTIONS
SURVEY:    You may be receiving a survey from [facility] [employer] regarding your visit today.    Please complete the survey to enable us to provide the highest quality of care to you and your family.    If you cannot score us a very good (5 Stars) on any question, please call the office to discuss how we could have made your experience a very good one.    Thank you.    Clinical Care Team: MD Luis Manuel Holt LPN              Triage: Brenna Banerjee CMA              Clerical Team: Brenna Singh

## 2024-03-26 NOTE — PROGRESS NOTES
HPI Notes    Name: Renuka Alexander  : 1924        Chief Complaint:     Chief Complaint   Patient presents with    Gastroesophageal Reflux     Patient here today for 6 month check up    Hypertension    Mental Health Problem       History of Present Illness:     Renuka Alexander is a 89 y.o.  female who presents with Gastroesophageal Reflux (Patient here today for 6 month check up), Hypertension, and Mental Health Problem      Gastroesophageal Reflux  She reports no abdominal pain, no belching, no chest pain, no choking, no coughing, no heartburn or no nausea. pt is doing well on the lexapro and no changes. This is a chronic problem. The current episode started more than 1 year ago. The problem has been unchanged. The symptoms are aggravated by certain foods. Pertinent negatives include no fatigue, melena or weight loss. She has tried a PPI for the symptoms. The treatment provided significant relief.   Hypertension  This is a chronic problem. The current episode started more than 1 year ago. The problem is unchanged. The problem is controlled. Pertinent negatives include no chest pain, headaches, malaise/fatigue, palpitations, peripheral edema or shortness of breath. There are no associated agents to hypertension. Risk factors for coronary artery disease include post-menopausal state. The current treatment provides significant improvement.   Mental Health Problem  The primary symptoms do not include dysphoric mood, delusions, hallucinations or disorganized speech. Primary symptoms comment: pt is doing well on the lexapro and no changes. The current episode started more than 1 month ago. This is a chronic problem.   The degree of incapacity that she is experiencing as a consequence of her illness is mild. Additional symptoms of the illness do not include unexpected weight change, fatigue, headaches or abdominal pain.       Past Medical History:     Past Medical History:   Diagnosis Date    GERD

## 2024-05-21 ENCOUNTER — HOSPITAL ENCOUNTER (EMERGENCY)
Age: 89
Discharge: NURSING, REHAB OR LONG-TERM CARE | End: 2024-05-21
Attending: EMERGENCY MEDICINE
Payer: MEDICARE

## 2024-05-21 ENCOUNTER — APPOINTMENT (OUTPATIENT)
Dept: CT IMAGING | Age: 89
End: 2024-05-21
Payer: MEDICARE

## 2024-05-21 VITALS
BODY MASS INDEX: 28.78 KG/M2 | WEIGHT: 189.9 LBS | SYSTOLIC BLOOD PRESSURE: 163 MMHG | DIASTOLIC BLOOD PRESSURE: 68 MMHG | HEIGHT: 68 IN | HEART RATE: 80 BPM | OXYGEN SATURATION: 93 % | TEMPERATURE: 97.9 F | RESPIRATION RATE: 20 BRPM

## 2024-05-21 DIAGNOSIS — S01.01XA LACERATION OF SCALP, INITIAL ENCOUNTER: Primary | ICD-10-CM

## 2024-05-21 DIAGNOSIS — S09.90XA CLOSED HEAD INJURY, INITIAL ENCOUNTER: ICD-10-CM

## 2024-05-21 PROCEDURE — 70450 CT HEAD/BRAIN W/O DYE: CPT

## 2024-05-21 PROCEDURE — 99284 EMERGENCY DEPT VISIT MOD MDM: CPT

## 2024-05-21 PROCEDURE — 12002 RPR S/N/AX/GEN/TRNK2.6-7.5CM: CPT

## 2024-05-21 ASSESSMENT — PAIN - FUNCTIONAL ASSESSMENT: PAIN_FUNCTIONAL_ASSESSMENT: NONE - DENIES PAIN

## 2024-05-21 NOTE — ED PROVIDER NOTES
EMERGENCY DEPARTMENT ENCOUNTER      CHIEF COMPLAINT    Chief Complaint   Patient presents with    Fall     Pt fell from standing outside on concrete, laceration to posterior head       HPI    Renuka Alexander is a 89 y.o. female who presents to the emergency room via EMS from local nursing home where she suffered a fall outside striking her head on the concrete.  She is unable to tell me how exactly she fell.  She is not on blood thinners.  She denies any pain at this time.  She is a DNR CC.      PAST MEDICAL HISTORY    Past Medical History:   Diagnosis Date    GERD (gastroesophageal reflux disease)     Hyperlipidemia     Hypertension     Urinary incontinence        SURGICAL HISTORY    Past Surgical History:   Procedure Laterality Date    BUNIONECTOMY      CHOLECYSTECTOMY, LAPAROSCOPIC  12/26/12    TONSILLECTOMY         CURRENT MEDICATIONS    Current Outpatient Rx   Medication Sig Dispense Refill    lidocaine (LIDODERM) 5 % Apply 1 patch topically daily      escitalopram (LEXAPRO) 5 MG tablet TAKE 1 TABLET EVERY DAY 90 tablet 3    hydroCHLOROthiazide (MICROZIDE) 12.5 MG capsule TAKE 1 CAPSULE EVERY DAY 90 capsule 3    omeprazole (PRILOSEC) 20 MG delayed release capsule TAKE 1 CAPSULE EVERY DAY 90 capsule 3    CRANBERRY-VITAMIN C PO Take by mouth 4200      benzonatate (TESSALON) 100 MG capsule Take 1 capsule by mouth 3 times daily as needed for Cough 30 capsule 0    ondansetron (ZOFRAN) 4 MG tablet Take 1 tablet by mouth 3 times daily as needed for Nausea or Vomiting 15 tablet 0    triamcinolone (KENALOG) 0.1 % cream Apply topically 2 times daily. (Patient not taking: Reported on 10/24/2023) 60 g 2    mupirocin (BACTROBAN) 2 % ointment Apply topically 2 times daily. (Patient not taking: Reported on 9/26/2023) 1 each 1    Menthol-Methyl Salicylate (MUSCLE RUB) 10-15 % CREA cream       docusate sodium (COLACE) 100 MG capsule Take 1 capsule by mouth 2 times daily as needed for Constipation      acetaminophen (TYLENOL)

## 2024-05-21 NOTE — ED NOTES
4 cm V shaped laceration to right posterior head - bleeding controlled. Wound cleansed. Pt denies pain anywhere else at this time. Pts son at bedside.

## 2024-05-22 ENCOUNTER — TELEPHONE (OUTPATIENT)
Dept: FAMILY MEDICINE CLINIC | Age: 89
End: 2024-05-22

## 2024-05-22 NOTE — TELEPHONE ENCOUNTER
Canceled appointment on 05/28/24  Called the [facility] spoke to Stella gave a verbal to remove staples   Notified pt's son

## 2024-05-22 NOTE — TELEPHONE ENCOUNTER
Ok to call [facility] assisted [facility] nurses and give order to take pt's 6 sutures out on Arnold May 26 -- then let son know and cancel her appt with me on Tues May 28

## 2024-05-22 NOTE — TELEPHONE ENCOUNTER
Renuka was in the ER yesterday for a fall and had to have 5 ya put in. Khanh said she needs to have them removed in 5 days. 5 days would be Sunday and since we are closed Monday she is scheduled with Dr. Armas on Tuesday. Is this ok to wait a couple extra days or should she go back to the ER to have them removed sooner? Please let Khanh know.    Health Maintenance   Topic Date Due    Shingles vaccine (1 of 2) Never done    Respiratory Syncytial Virus (RSV) Pregnant or age 60 yrs+ (1 - 1-dose 60+ series) Never done    COVID-19 Vaccine (9 - 2023-24 season) 09/01/2023    Flu vaccine (Season Ended) 08/01/2024    Annual Wellness Visit (Medicare)  09/26/2024    Depression Monitoring  03/26/2025    DTaP/Tdap/Td vaccine (2 - Td or Tdap) 06/18/2028    Pneumococcal 65+ years Vaccine  Completed    Hepatitis A vaccine  Aged Out    Hepatitis B vaccine  Aged Out    Hib vaccine  Aged Out    Polio vaccine  Aged Out    Meningococcal (ACWY) vaccine  Aged Out    Depression Screen  Discontinued             (applicable per patient's age: Cancer Screenings, Depression Screening, Fall Risk Screening, Immunizations)    AST (U/L)   Date Value   05/20/2022 21     ALT (U/L)   Date Value   05/20/2022 15     BUN (mg/dL)   Date Value   05/21/2022 23      (goal A1C is < 7)   (goal LDL is <100) need 30-50% reduction from baseline     BP Readings from Last 3 Encounters:   05/21/24 (!) 163/68   03/26/24 112/62   10/24/23 112/62    (goal /80)      All Future Testing planned in [facility]:  Lab Frequency Next Occurrence       Next Visit Date:  Future Appointments   Date Time Provider Department Center   5/28/2024 10:40 AM Belen Armsa MD [facility]   9/30/2024  9:40 AM Belen Armas MD [facility]            Patient Active Problem List:     Esophageal reflux     Diaphragmatic hernia     Essential hypertension, benign     Pure hypercholesterolemia     Endometrial thickening on ultrasound     Cervical stenosis (uterine

## 2024-05-22 NOTE — TELEPHONE ENCOUNTER
Advised pt's son of provider's notes, voiced understanding.    He stated the nursing home could remove them as well with an order from the

## 2024-05-22 NOTE — TELEPHONE ENCOUNTER
Tell pt's son Alison few extra days will be fine to keep in and I should be able take out just fine. If pt starts having a lot more itching and wants them out earlier can go to ER Sun/Mon but Tues is fine

## 2024-08-01 ENCOUNTER — TELEPHONE (OUTPATIENT)
Dept: FAMILY MEDICINE CLINIC | Age: 89
End: 2024-08-01

## 2024-08-01 NOTE — TELEPHONE ENCOUNTER
Patient refusing to wear KARAN hose  Her lower legs are swollen  She wants to sit in the sun everyday  Weight 191 today, last weight was 186 lbs last month.    Shirley called from the [facility]    Next OV 9/30/24

## 2024-09-27 SDOH — HEALTH STABILITY: PHYSICAL HEALTH: ON AVERAGE, HOW MANY MINUTES DO YOU ENGAGE IN EXERCISE AT THIS LEVEL?: 50 MIN

## 2024-09-27 SDOH — HEALTH STABILITY: PHYSICAL HEALTH: ON AVERAGE, HOW MANY DAYS PER WEEK DO YOU ENGAGE IN MODERATE TO STRENUOUS EXERCISE (LIKE A BRISK WALK)?: 4 DAYS

## 2024-09-27 ASSESSMENT — PATIENT HEALTH QUESTIONNAIRE - PHQ9
10. IF YOU CHECKED OFF ANY PROBLEMS, HOW DIFFICULT HAVE THESE PROBLEMS MADE IT FOR YOU TO DO YOUR WORK, TAKE CARE OF THINGS AT HOME, OR GET ALONG WITH OTHER PEOPLE: NOT DIFFICULT AT ALL
7. TROUBLE CONCENTRATING ON THINGS, SUCH AS READING THE NEWSPAPER OR WATCHING TELEVISION: NOT AT ALL
SUM OF ALL RESPONSES TO PHQ QUESTIONS 1-9: 2
SUM OF ALL RESPONSES TO PHQ QUESTIONS 1-9: 2
2. FEELING DOWN, DEPRESSED OR HOPELESS: SEVERAL DAYS
5. POOR APPETITE OR OVEREATING: NOT AT ALL
6. FEELING BAD ABOUT YOURSELF - OR THAT YOU ARE A FAILURE OR HAVE LET YOURSELF OR YOUR FAMILY DOWN: NOT AT ALL
SUM OF ALL RESPONSES TO PHQ QUESTIONS 1-9: 2
1. LITTLE INTEREST OR PLEASURE IN DOING THINGS: SEVERAL DAYS
3. TROUBLE FALLING OR STAYING ASLEEP: NOT AT ALL
SUM OF ALL RESPONSES TO PHQ9 QUESTIONS 1 & 2: 2
SUM OF ALL RESPONSES TO PHQ QUESTIONS 1-9: 2
4. FEELING TIRED OR HAVING LITTLE ENERGY: NOT AT ALL
9. THOUGHTS THAT YOU WOULD BE BETTER OFF DEAD, OR OF HURTING YOURSELF: NOT AT ALL
8. MOVING OR SPEAKING SO SLOWLY THAT OTHER PEOPLE COULD HAVE NOTICED. OR THE OPPOSITE, BEING SO FIGETY OR RESTLESS THAT YOU HAVE BEEN MOVING AROUND A LOT MORE THAN USUAL: NOT AT ALL

## 2024-09-27 ASSESSMENT — LIFESTYLE VARIABLES
HOW OFTEN DO YOU HAVE A DRINK CONTAINING ALCOHOL: 2
HOW OFTEN DO YOU HAVE A DRINK CONTAINING ALCOHOL: MONTHLY OR LESS
HOW MANY STANDARD DRINKS CONTAINING ALCOHOL DO YOU HAVE ON A TYPICAL DAY: 0
HOW OFTEN DO YOU HAVE SIX OR MORE DRINKS ON ONE OCCASION: 1
HOW MANY STANDARD DRINKS CONTAINING ALCOHOL DO YOU HAVE ON A TYPICAL DAY: PATIENT DOES NOT DRINK

## 2024-09-30 ENCOUNTER — OFFICE VISIT (OUTPATIENT)
Dept: FAMILY MEDICINE CLINIC | Age: 89
End: 2024-09-30

## 2024-09-30 VITALS
WEIGHT: 189 LBS | HEART RATE: 72 BPM | BODY MASS INDEX: 28.64 KG/M2 | HEIGHT: 68 IN | OXYGEN SATURATION: 95 % | SYSTOLIC BLOOD PRESSURE: 118 MMHG | DIASTOLIC BLOOD PRESSURE: 74 MMHG

## 2024-09-30 DIAGNOSIS — K21.9 GASTROESOPHAGEAL REFLUX DISEASE WITHOUT ESOPHAGITIS: ICD-10-CM

## 2024-09-30 DIAGNOSIS — I10 ESSENTIAL HYPERTENSION, BENIGN: ICD-10-CM

## 2024-09-30 DIAGNOSIS — Z00.00 MEDICARE ANNUAL WELLNESS VISIT, SUBSEQUENT: Primary | ICD-10-CM

## 2024-09-30 DIAGNOSIS — F34.1 DYSTHYMIA: ICD-10-CM

## 2024-09-30 PROCEDURE — 1123F ACP DISCUSS/DSCN MKR DOCD: CPT | Performed by: FAMILY MEDICINE

## 2024-09-30 RX ORDER — MUSCLE RUB CREAM 100; 150 MG/G; MG/G
CREAM TOPICAL PRN
COMMUNITY

## 2024-09-30 RX ORDER — BENZONATATE 100 MG/1
100 CAPSULE ORAL 3 TIMES DAILY PRN
COMMUNITY

## 2024-09-30 ASSESSMENT — ENCOUNTER SYMPTOMS
SHORTNESS OF BREATH: 0
VOMITING: 0
CHOKING: 0
BLOOD IN STOOL: 0
HEARTBURN: 0
EYE REDNESS: 0
ABDOMINAL PAIN: 0
SORE THROAT: 0
DIARRHEA: 0
EYE DISCHARGE: 0
HOARSE VOICE: 0

## 2024-09-30 NOTE — PROGRESS NOTES
HPI Notes    Name: Renuka Alexander  : 1924        Chief Complaint:     Chief Complaint   Patient presents with    Medicare AWV    Gastroesophageal Reflux    Hypertension    Mental Health Problem       History of Present Illness:     Renuka Alexander is a 89 y.o.  female who presents with Medicare AWV, Gastroesophageal Reflux, Hypertension, and Mental Health Problem      Gastroesophageal Reflux  She reports no abdominal pain, no chest pain, no choking, no dysphagia, no heartburn, no hoarse voice or no sore throat. .Pt is doing well and taking the Lexapro and living still at the [facility].. This is a chronic problem. The current episode started more than 1 year ago. The problem has been unchanged. The symptoms are aggravated by certain foods. Pertinent negatives include no fatigue, melena or weight loss. She has tried a PPI for the symptoms. The treatment provided significant relief.   Hypertension  This is a chronic problem. The current episode started more than 1 year ago. The problem is unchanged. The problem is controlled. Associated symptoms include peripheral edema. Pertinent negatives include no chest pain, headaches, malaise/fatigue, neck pain, palpitations or shortness of breath. Risk factors for coronary artery disease include post-menopausal state. The current treatment provides significant improvement.   Mental Health Problem  The primary symptoms do not include dysphoric mood, delusions or hallucinations. Primary symptoms comment: .Pt is doing well and taking the Lexapro and living still at the [facility].. The current episode started more than 1 month ago. This is a chronic problem.   The degree of incapacity that she is experiencing as a consequence of her illness is mild. Additional symptoms of the illness include appetite change. Additional symptoms of the illness do not include anhedonia, insomnia, hypersomnia, unexpected weight change, fatigue, headaches or abdominal pain.       Past

## 2024-09-30 NOTE — PROGRESS NOTES
Medicare Annual Wellness Visit    Renuka Alexander is here for Medicare AWV, Gastroesophageal Reflux, Hypertension, and Mental Health Problem    Assessment & Plan   Medicare annual wellness visit, subsequent  Gastroesophageal reflux disease without esophagitis  Essential hypertension, benign  Dysthymia    Recommendations for Preventive Services Due: see orders and patient instructions/AVS.  Recommended screening schedule for the next 5-10 years is provided to the patient in written form: see Patient Instructions/AVS.     Return in about 6 months (around 3/30/2025) for HTN, gerd.     Subjective   The following acute and/or chronic problems were also addressed today:    Patient's complete Health Risk Assessment and screening values have been reviewed and are found in Flowsheets. The following problems were reviewed today and where indicated follow up appointments were made and/or referrals ordered.    Positive Risk Factor Screenings with Interventions:                    Hearing Screen:  Do you or your family notice any trouble with your hearing that hasn't been managed with hearing aids?: (!) Yes    Interventions:  Pt seeing audiology tomorrow    Vision Screen:  Do you have difficulty driving, watching TV, or doing any of your daily activities because of your eyesight?: No  Have you had an eye exam within the past year?: (!) No  Interventions:   Patient declines any further evaluation or treatment     ADL's:   Patient reports needing help with:  Select all that apply: (!) Laundry, Housekeeping, Food Preparation, Transportation  Interventions:  Pt lives in an Assistant Living and son close by                Objective   Vitals:    09/30/24 0952   BP: 118/74   Pulse: 72   SpO2: 95%   Weight: 85.7 kg (189 lb)   Height: 1.727 m (5' 8\")      Body mass index is 28.74 kg/m².      PE - see 6mos note          No Known Allergies  Prior to Visit Medications    Medication Sig Taking? Authorizing Provider   benzonatate (TESSALON)

## 2024-09-30 NOTE — PATIENT INSTRUCTIONS
as well as you can. You may want to bring a caregiver, friend, or family member to your checkup. They can help you talk to your doctor.  Follow-up care is a key part of your treatment and safety. Be sure to make and go to all appointments, and call your doctor if you are having problems. It's also a good idea to know your test results and keep a list of the medicines you take.  Current as of: October 24, 2023  Content Version: 14.1  © 2006-2024 [employer].   Care instructions adapted under license by [employer]. If you have questions about a medical condition or this instruction, always ask your healthcare professional. [employer] disclaims any warranty or liability for your use of this information.           A Healthy Heart: Care Instructions  Overview     Coronary artery disease, also called heart disease, occurs when a substance called plaque builds up in the vessels that supply oxygen-rich blood to your heart muscle. This can narrow the blood vessels and reduce blood flow. A heart attack happens when blood flow is completely blocked. A high-fat diet, smoking, and other factors increase the risk of heart disease.  Your doctor has found that you have a chance of having heart disease. A heart-healthy lifestyle can help keep your heart healthy and prevent heart disease. This lifestyle includes eating healthy, being active, staying at a weight that's healthy for you, and not smoking or using tobacco. It also includes taking medicines as directed, managing other health conditions, and trying to get a healthy amount of sleep.  Follow-up care is a key part of your treatment and safety. Be sure to make and go to all appointments, and call your doctor if you are having problems. It's also a good idea to know your test results and keep a list of the medicines you take.  How can you care for yourself at home?  Diet    Use less salt when you cook and eat. This helps lower your blood pressure.

## 2024-10-15 DIAGNOSIS — K21.9 GASTROESOPHAGEAL REFLUX DISEASE WITHOUT ESOPHAGITIS: Primary | ICD-10-CM

## 2024-10-16 RX ORDER — HYDROCHLOROTHIAZIDE 12.5 MG/1
CAPSULE ORAL
Qty: 90 CAPSULE | Refills: 3 | Status: SHIPPED | OUTPATIENT
Start: 2024-10-16

## 2024-10-16 NOTE — TELEPHONE ENCOUNTER
Last visit:  9/30/2024  Next Visit Date:    Future Appointments   Date Time Provider Department Center   3/31/2025  9:40 AM Belen Armas MD [facility]         Medication List:  Prior to Admission medications    Medication Sig Start Date End Date Taking? Authorizing Provider   benzonatate (TESSALON) 100 MG capsule Take 1 capsule by mouth 3 times daily as needed for Cough    Neto Lopez MD   Menthol-Methyl Salicylate (MUSCLE RUB) 10-15 % CREA cream Apply topically as needed    Neto Lopez MD   lidocaine (LIDODERM) 5 % Apply 1 patch topically daily    Neto Lopez MD   escitalopram (LEXAPRO) 5 MG tablet TAKE 1 TABLET EVERY DAY 3/18/24   Belen Armas MD   hydroCHLOROthiazide (MICROZIDE) 12.5 MG capsule TAKE 1 CAPSULE EVERY DAY 12/19/23   Belen Armas MD   omeprazole (PRILOSEC) 20 MG delayed release capsule TAKE 1 CAPSULE EVERY DAY 12/19/23   Belen Armas MD   CRANBERRY-VITAMIN C PO Take by mouth 4200    Neto Lopez MD   ondansetron (ZOFRAN) 4 MG tablet Take 1 tablet by mouth 3 times daily as needed for Nausea or Vomiting 1/23/23   Belen Armas MD   triamcinolone (KENALOG) 0.1 % cream Apply topically 2 times daily.  Patient not taking: Reported on 10/24/2023 12/27/22   Belen Armas MD   mupirocin (BACTROBAN) 2 % ointment Apply topically 2 times daily.  Patient not taking: Reported on 9/26/2023 11/8/22   Belen Armas MD   docusate sodium (COLACE) 100 MG capsule Take 1 capsule by mouth 2 times daily as needed for Constipation    Neto Lopez MD   acetaminophen (TYLENOL) 325 MG tablet Take 2 tablets by mouth every 6 hours as needed for Pain    Neto Lopez MD

## 2025-01-02 RX ORDER — ESCITALOPRAM OXALATE 5 MG/1
5 TABLET ORAL DAILY
Qty: 90 TABLET | Refills: 3 | Status: SHIPPED | OUTPATIENT
Start: 2025-01-02

## 2025-01-02 NOTE — TELEPHONE ENCOUNTER
Last OV 9/30/24 for AWV and chronics  Requesting refill on escitalopram via sure script  Next OV 3/31/25

## 2025-01-29 ENCOUNTER — HOSPITAL ENCOUNTER (OUTPATIENT)
Age: 89
Setting detail: SPECIMEN
Discharge: HOME | End: 2025-01-29
Payer: MEDICARE

## 2025-01-29 LAB
-: ABNORMAL
BACTERIA URNS QL MICRO: ABNORMAL
BILIRUB UR QL STRIP: NEGATIVE
CLARITY UR: CLEAR
COLOR UR: YELLOW
COMMENT: ABNORMAL
EPI CELLS #/AREA URNS HPF: ABNORMAL /HPF
GLUCOSE UR STRIP-MCNC: NEGATIVE MG/DL
HGB UR QL STRIP.AUTO: ABNORMAL
KETONES UR STRIP-MCNC: NEGATIVE MG/DL
LEUKOCYTE ESTERASE UR QL STRIP: ABNORMAL
NITRITE UR QL STRIP: POSITIVE
PH UR STRIP: 6 [PH] (ref 5–8)
PROT UR STRIP-MCNC: ABNORMAL MG/DL
RBC #/AREA URNS HPF: ABNORMAL /HPF (ref 0–2)
SP GR UR STRIP: 1.02 (ref 1–1.03)
UROBILINOGEN UR STRIP-ACNC: NORMAL EU/DL (ref 0–1)
WBC #/AREA URNS HPF: ABNORMAL /HPF

## 2025-01-29 PROCEDURE — 87077 CULTURE AEROBIC IDENTIFY: CPT

## 2025-01-29 PROCEDURE — 87186 SC STD MICRODIL/AGAR DIL: CPT

## 2025-01-29 PROCEDURE — 81001 URINALYSIS AUTO W/SCOPE: CPT

## 2025-01-29 PROCEDURE — 87086 URINE CULTURE/COLONY COUNT: CPT

## 2025-01-30 ENCOUNTER — TELEPHONE (OUTPATIENT)
Dept: FAMILY MEDICINE CLINIC | Age: 89
End: 2025-01-30

## 2025-01-30 RX ORDER — CIPROFLOXACIN 250 MG/1
250 TABLET, FILM COATED ORAL 2 TIMES DAILY
Qty: 14 TABLET | Refills: 0 | Status: SHIPPED | OUTPATIENT
Start: 2025-01-30 | End: 2025-02-06

## 2025-01-30 NOTE — TELEPHONE ENCOUNTER
----- Message from Dr. Belen Armas MD sent at 1/30/2025  8:37 AM EST -----  Call [facility] and tell them pt does have a UTI so I will send cipro 250mg one BID for 7d. Just make sure which pharmacy??

## 2025-01-30 NOTE — TELEPHONE ENCOUNTER
Spoke with Shirley at the [facility] and told her patient does have UTI and medication will be sent  Shirley said to [facility] and patient's son will pick it up    Rx pending

## 2025-01-31 LAB
MICROORGANISM SPEC CULT: ABNORMAL
SPECIMEN DESCRIPTION: ABNORMAL

## 2025-02-21 ENCOUNTER — APPOINTMENT (OUTPATIENT)
Dept: GENERAL RADIOLOGY | Age: 89
End: 2025-02-21
Payer: MEDICARE

## 2025-02-21 ENCOUNTER — TELEPHONE (OUTPATIENT)
Dept: FAMILY MEDICINE CLINIC | Age: 89
End: 2025-02-21

## 2025-02-21 ENCOUNTER — HOSPITAL ENCOUNTER (EMERGENCY)
Age: 89
Discharge: NURSING, REHAB OR LONG-TERM CARE | End: 2025-02-21
Attending: EMERGENCY MEDICINE
Payer: MEDICARE

## 2025-02-21 VITALS
BODY MASS INDEX: 30.62 KG/M2 | RESPIRATION RATE: 22 BRPM | OXYGEN SATURATION: 98 % | DIASTOLIC BLOOD PRESSURE: 57 MMHG | HEIGHT: 68 IN | HEART RATE: 78 BPM | SYSTOLIC BLOOD PRESSURE: 115 MMHG | TEMPERATURE: 98.5 F | WEIGHT: 202 LBS

## 2025-02-21 DIAGNOSIS — R60.0 PERIPHERAL EDEMA: Primary | ICD-10-CM

## 2025-02-21 LAB
ALBUMIN SERPL-MCNC: 3.6 G/DL (ref 3.5–5.2)
ALBUMIN/GLOB SERPL: 1.2 {RATIO} (ref 1–2.5)
ALP SERPL-CCNC: 122 U/L (ref 35–104)
ALT SERPL-CCNC: 15 U/L (ref 5–33)
ANION GAP SERPL CALCULATED.3IONS-SCNC: 12 MMOL/L (ref 9–17)
AST SERPL-CCNC: 24 U/L
BASOPHILS # BLD: 0.01 K/UL (ref 0–0.2)
BASOPHILS NFR BLD: 0 % (ref 0–2)
BILIRUB SERPL-MCNC: <0.2 MG/DL (ref 0.3–1.2)
BNP SERPL-MCNC: 490 PG/ML
BUN SERPL-MCNC: 26 MG/DL (ref 8–23)
CALCIUM SERPL-MCNC: 9.7 MG/DL (ref 8.6–10.4)
CHLORIDE SERPL-SCNC: 101 MMOL/L (ref 98–107)
CO2 SERPL-SCNC: 29 MMOL/L (ref 20–31)
CREAT SERPL-MCNC: 1.3 MG/DL (ref 0.5–0.9)
EKG ATRIAL RATE: 81 BPM
EKG P AXIS: 57 DEGREES
EKG P-R INTERVAL: 200 MS
EKG Q-T INTERVAL: 414 MS
EKG QRS DURATION: 128 MS
EKG QTC CALCULATION (BAZETT): 480 MS
EKG R AXIS: -20 DEGREES
EKG T AXIS: 37 DEGREES
EKG VENTRICULAR RATE: 81 BPM
EOSINOPHIL # BLD: 0.17 K/UL (ref 0–0.4)
EOSINOPHILS RELATIVE PERCENT: 3 % (ref 0–5)
ERYTHROCYTE [DISTWIDTH] IN BLOOD BY AUTOMATED COUNT: 15 % (ref 12.1–15.2)
GFR, ESTIMATED: 37 ML/MIN/1.73M2
GLUCOSE SERPL-MCNC: 135 MG/DL (ref 70–99)
HCT VFR BLD AUTO: 38.8 % (ref 36–46)
HGB BLD-MCNC: 12.9 G/DL (ref 12–16)
IMM GRANULOCYTES # BLD AUTO: 0.03 K/UL (ref 0–0.3)
IMM GRANULOCYTES NFR BLD: 1 % (ref 0–5)
LYMPHOCYTES NFR BLD: 2.18 K/UL (ref 1–4.8)
LYMPHOCYTES RELATIVE PERCENT: 33 % (ref 15–40)
MCH RBC QN AUTO: 31.9 PG (ref 26–34)
MCHC RBC AUTO-ENTMCNC: 33.2 G/DL (ref 31–37)
MCV RBC AUTO: 96 FL (ref 80–100)
MONOCYTES NFR BLD: 0.5 K/UL (ref 0–1)
MONOCYTES NFR BLD: 8 % (ref 4–8)
NEUTROPHILS NFR BLD: 55 % (ref 47–75)
NEUTS SEG NFR BLD: 3.73 K/UL (ref 2.5–7)
PLATELET # BLD AUTO: 195 K/UL (ref 140–450)
PMV BLD AUTO: 9.9 FL (ref 6–12)
POTASSIUM SERPL-SCNC: 3.6 MMOL/L (ref 3.7–5.3)
PROT SERPL-MCNC: 6.5 G/DL (ref 6.4–8.3)
RBC # BLD AUTO: 4.04 M/UL (ref 4–5.2)
SODIUM SERPL-SCNC: 142 MMOL/L (ref 135–144)
TROPONIN I SERPL HS-MCNC: 35 NG/L (ref 0–14)
WBC OTHER # BLD: 6.6 K/UL (ref 3.5–11)

## 2025-02-21 PROCEDURE — 84484 ASSAY OF TROPONIN QUANT: CPT

## 2025-02-21 PROCEDURE — 36415 COLL VENOUS BLD VENIPUNCTURE: CPT

## 2025-02-21 PROCEDURE — 93005 ELECTROCARDIOGRAM TRACING: CPT | Performed by: EMERGENCY MEDICINE

## 2025-02-21 PROCEDURE — 85025 COMPLETE CBC W/AUTO DIFF WBC: CPT

## 2025-02-21 PROCEDURE — 71045 X-RAY EXAM CHEST 1 VIEW: CPT

## 2025-02-21 PROCEDURE — 80053 COMPREHEN METABOLIC PANEL: CPT

## 2025-02-21 PROCEDURE — 99285 EMERGENCY DEPT VISIT HI MDM: CPT

## 2025-02-21 PROCEDURE — 83880 ASSAY OF NATRIURETIC PEPTIDE: CPT

## 2025-02-21 RX ORDER — FUROSEMIDE 20 MG/1
20 TABLET ORAL DAILY
Qty: 3 TABLET | Refills: 0 | Status: SHIPPED | OUTPATIENT
Start: 2025-02-21

## 2025-02-21 RX ORDER — 0.9 % SODIUM CHLORIDE 0.9 %
800 INTRAVENOUS SOLUTION INTRAVENOUS ONCE
Status: DISCONTINUED | OUTPATIENT
Start: 2025-02-21 | End: 2025-02-21

## 2025-02-21 ASSESSMENT — LIFESTYLE VARIABLES
HOW OFTEN DO YOU HAVE A DRINK CONTAINING ALCOHOL: MONTHLY OR LESS
HOW MANY STANDARD DRINKS CONTAINING ALCOHOL DO YOU HAVE ON A TYPICAL DAY: 1 OR 2

## 2025-02-21 ASSESSMENT — PAIN - FUNCTIONAL ASSESSMENT: PAIN_FUNCTIONAL_ASSESSMENT: NONE - DENIES PAIN

## 2025-02-21 NOTE — TELEPHONE ENCOUNTER
I called Stella regarding pt condition and to clarify wheezing. Stella describes hearing a \"moist wheezing\" while breathing. This is noted without the use of a stethoscope. Pt has been wearing compression stockings and taking all medications. Stella advised to listen to pt's lungs with a stethoscope and report findings.

## 2025-02-21 NOTE — TELEPHONE ENCOUNTER
I was contacted earlier today by my triage staff that [facility] nurse had called concerned Pt was having more leg swelling and SOB. I had ordered lasix 20mg daily for 3d. I was not in the office today but called back to speak to nurse Stella at the [facility] now and she states looking at pt's records she had gained 14lbs since beginning of Feb, SOB but lungs sound clear and significant swelling in ankles. I told nurse to have pt's son take her to ER.

## 2025-02-21 NOTE — TELEPHONE ENCOUNTER
Called Stella at the [facility] PCP notes, Stella voiced understanding     Medication pending

## 2025-02-21 NOTE — TELEPHONE ENCOUNTER
Stella from the [facility] called stating that pt has 2+/3+ pitting bilateral leg edema. Pt is also wheezing today. Could pt get some medication to help take fluid off her body? Please advise

## 2025-02-21 NOTE — ED PROVIDER NOTES
EMERGENCY DEPARTMENT ENCOUNTER      CHIEF COMPLAINT    Chief Complaint   Patient presents with    Leg Swelling     Pt [facility] for increased leg swelling, 14 pound weight gain since February 1st and increased wheezing that the [facility] and her son noticed today        KAY Alexander is a 89 y.o. female who presents to the emergency room from [facility]-care facility for evaluation of shortness of breath that started today with associated cough and audible wheezing.  According to nurse from the facility where patient presented she has had a 15 pound weight gain in the last 11 days.  She denies any chest pain or abdominal pain.  Lasix 20 mg was prescribed to patient today but she has yet to start that.  Patient is a DNR CC.    PAST MEDICAL HISTORY    Past Medical History:   Diagnosis Date    GERD (gastroesophageal reflux disease)     Hyperlipidemia     Hypertension     Urinary incontinence        SURGICAL HISTORY    Past Surgical History:   Procedure Laterality Date    BUNIONECTOMY      CHOLECYSTECTOMY, LAPAROSCOPIC  12/26/12    TONSILLECTOMY         CURRENT MEDICATIONS    Current Outpatient Rx   Medication Sig Dispense Refill    furosemide (LASIX) 20 MG tablet Take 1 tablet by mouth daily For three days 3 tablet 0    escitalopram (LEXAPRO) 5 MG tablet TAKE 1 TABLET EVERY DAY 90 tablet 3    hydroCHLOROthiazide 12.5 MG capsule TAKE 1 CAPSULE EVERY DAY 90 capsule 3    omeprazole (PRILOSEC) 20 MG delayed release capsule TAKE 1 CAPSULE EVERY DAY 90 capsule 3    CRANBERRY-VITAMIN C PO Take by mouth 4200      docusate sodium (COLACE) 100 MG capsule Take 1 capsule by mouth 2 times daily as needed for Constipation      benzonatate (TESSALON) 100 MG capsule Take 1 capsule by mouth 3 times daily as needed for Cough      ondansetron (ZOFRAN) 4 MG tablet Take 1 tablet by mouth 3 times daily as needed for Nausea or Vomiting 15 tablet 0    acetaminophen (TYLENOL) 325 MG tablet Take 2 tablets by mouth every 6

## 2025-02-21 NOTE — TELEPHONE ENCOUNTER
Spoke to PCP advised her of Stella's call.   PCP advised to have pt take lasix 20 mg on tablet x 3 days   Also have pt wear her support socks and elevated legs

## 2025-02-21 NOTE — DISCHARGE INSTRUCTIONS
Chief Complaint/HPI





- Patient Information


Date Seen:: 02/27/17


Time Seen:: 15:21


Chief Complaint:: dysuria


History of Present Illness:: 





72-year-old female brought in by caregiver with acute, worsening, severe, 10 

out of 10, dysuria 3 days.  Has associated increased agitation.





**History limited due to underlying dementia and psychosis


**History provided by caregiver


Allergies:: 


 Allergies











Allergy/AdvReac Type Severity Reaction Status Date / Time


 


No Known Allergies Allergy   Unverified 06/01/16 10:16











Historian:: Patient, Other (caregiver)


Review:: Nurse's Note Reviewed





Review of Systems





- Review of Systems


Other: Complete system review otherwise unremarkable except as noted in HPI.





Past Medical History





- Past Medical History


Past Medical History: Dementia, Other (intellectual disability, aggressive 

behavior)


Family History: None


Social History: Non Smoker, No Alcohol, No Drug Use, Care Facility


Surgical History: None


Psychiatricy History: Schizophrenia


Medication: Reviewed





Family Medical History





- Family Member


  ** Mother


History Unknown: Yes


Ethnicity: Non-


Living Status: Unknown





Physical Exam





- Physical Examination


Other:: 





INITIAL VITAL SIGNS: Reviewed by me


GENERAL: Alert and interactive but demented and uncooperative. No acute distress


HEAD: Head is normocephalic and atraumatic


EYES: EOMI. . No scleral icterus. No conjunctival injection


ENT: Moist mucous membranes. 


NECK: Supple. No masses. Full range of motion


RESPIRATORY: No tachypnea. Clear breath sounds bilaterally. No wheezing, rales, 

or rhonchi


CV: Regular rate and rhythm. No murmurs, rubs, or gallops


ABDOMEN: Soft, non-distended, non-tender. No guarding. No rebound. No masses. 


EXTREMITIES: No deformity. No cyanosis. No edema. 


SKIN: Warm and dry. No obvious rashes. 


NEUROLOGIC: Alert and oriented. Face is symmetric. Speech is normal. Moves all 

extremities equally. Motor and sensory distally intact. 





Labs/Radiology/EKG Results





- Lab Results


Results: 





 Lab Results











  02/27/17 02/27/17 02/27/17 Range/Units





  15:40 15:40 15:40 


 


WBC  8.5  D    (4.8-10.8)  Th/cmm


 


RBC  4.38    (3.80-5.20)  Mil/cmm


 


Hgb  13.1    (11.7-16.1)  gm/dL


 


Hct  39.4    (35.0-45.0)  %


 


MCV  89.8    ()  fl


 


MCH  29.9    (27.0-31.0)  pg


 


MCHC Differential  33.3    (28.0-36.0)  pg


 


RDW  14.0    (11.5-20.0)  %


 


Plt Count  205  D    (150-400)  Th/cmm


 


MPV  10.2    fl


 


Neutrophils %  74.9    (40.0-80.0)  %


 


Lymphocytes %  14.5 L    (20.0-50.0)  %


 


Monocytes %  8.9    (2.0-10.0)  %


 


Eosinophils %  1.5    (0.0-5.0)  %


 


Basophils %  0.2    (0.0-2.0)  %


 


PT   10.7   (9.5-11.5)  SECONDS


 


INR   1.08   (0.5-1.4)  


 


PTT (Actin FS)   27.4   (26.0-38.0)  SECONDS


 


Sodium    138  (136-145)  mEq/L


 


Potassium    4.3  (3.5-5.1)  mEq/L


 


Chloride    105  ()  mEq/L


 


Carbon Dioxide    30.8  (21.0-31.0)  mEq/L


 


Anion Gap    6.5 L  (7.0-16.0)  


 


BUN    26 H  (7-25)  mg/dL


 


Creatinine    0.9  (0.6-1.2)  mg/dL


 


Est GFR (African Amer)    TNP  


 


Est GFR (Non-Af Amer)    TNP  


 


BUN/Creatinine Ratio    28.9  


 


Glucose    103  ()  mg/dL


 


Whole Bld Lactic Acid     (0.60-1.99)  mmol/L


 


Calcium    9.9  (8.6-10.3)  mg/dL


 


Total Bilirubin    0.2 L  (0.3-1.0)  mg/dL


 


AST    16  (13-39)  U/L


 


ALT    5 L  (7-52)  U/L


 


Alkaline Phosphatase    64  ()  U/L


 


Total Protein    7.3  (6.0-8.3)  gm/dL


 


Albumin    3.7  (3.7-5.3)  gm/dL


 


Globulin    3.6  gm/dL


 


Albumin/Globulin Ratio    1.0  (1.0-1.8)  


 


Urine Source     


 


Urine Color     


 


Urine Clarity     (CLEAR)  


 


Urine pH     


 


Ur Specific Gravity     (1.005-1.030)  


 


Urine Protein     (NEGATIVE)  mg/dL


 


Urine Glucose (UA)     (NEGATIVE)  mg/dL


 


Urine Ketones     (NEGATIVE)  mg/dL


 


Urine Blood     (NEGATIVE)  


 


Urine Nitrate     (NEGATIVE)  


 


Urine Bilirubin     (NEGATIVE)  


 


Urine Urobilinogen     (0.2 - 1.0)  E.U./dL


 


Ur Leukocyte Esterase     (NEGATIVE)  


 


Urine RBC     (0-5)  /hpf


 


Urine WBC     (0-5)  /hpf


 


Ur Epithelial Cells     (FEW)  /lpf


 


Urine Bacteria     (NONE SEEN)  /hpf


 


Digoxin     (0.8-2.0)  ng/ml


 


Valproic Acid     (50.0-100.0)  ug/mL














  02/27/17 02/27/17 02/27/17 Range/Units





  15:40 15:40 15:40 


 


WBC     (4.8-10.8)  Th/cmm


 


RBC     (3.80-5.20)  Mil/cmm


 


Hgb     (11.7-16.1)  gm/dL


 


Hct     (35.0-45.0)  %


 


MCV     ()  fl


 


MCH     (27.0-31.0)  pg


 


MCHC Differential     (28.0-36.0)  pg


 


RDW     (11.5-20.0)  %


 


Plt Count     (150-400)  Th/cmm


 


MPV     fl


 


Neutrophils %     (40.0-80.0)  %


 


Lymphocytes %     (20.0-50.0)  %


 


Monocytes %     (2.0-10.0)  %


 


Eosinophils %     (0.0-5.0)  %


 


Basophils %     (0.0-2.0)  %


 


PT     (9.5-11.5)  SECONDS


 


INR     (0.5-1.4)  


 


PTT (Actin FS)     (26.0-38.0)  SECONDS


 


Sodium     (136-145)  mEq/L


 


Potassium     (3.5-5.1)  mEq/L


 


Chloride     ()  mEq/L


 


Carbon Dioxide     (21.0-31.0)  mEq/L


 


Anion Gap     (7.0-16.0)  


 


BUN     (7-25)  mg/dL


 


Creatinine     (0.6-1.2)  mg/dL


 


Est GFR (African Amer)     


 


Est GFR (Non-Af Amer)     


 


BUN/Creatinine Ratio     


 


Glucose     ()  mg/dL


 


Whole Bld Lactic Acid  1.12    (0.60-1.99)  mmol/L


 


Calcium     (8.6-10.3)  mg/dL


 


Total Bilirubin     (0.3-1.0)  mg/dL


 


AST     (13-39)  U/L


 


ALT     (7-52)  U/L


 


Alkaline Phosphatase     ()  U/L


 


Total Protein     (6.0-8.3)  gm/dL


 


Albumin     (3.7-5.3)  gm/dL


 


Globulin     gm/dL


 


Albumin/Globulin Ratio     (1.0-1.8)  


 


Urine Source     


 


Urine Color     


 


Urine Clarity     (CLEAR)  


 


Urine pH     


 


Ur Specific Gravity     (1.005-1.030)  


 


Urine Protein     (NEGATIVE)  mg/dL


 


Urine Glucose (UA)     (NEGATIVE)  mg/dL


 


Urine Ketones     (NEGATIVE)  mg/dL


 


Urine Blood     (NEGATIVE)  


 


Urine Nitrate     (NEGATIVE)  


 


Urine Bilirubin     (NEGATIVE)  


 


Urine Urobilinogen     (0.2 - 1.0)  E.U./dL


 


Ur Leukocyte Esterase     (NEGATIVE)  


 


Urine RBC     (0-5)  /hpf


 


Urine WBC     (0-5)  /hpf


 


Ur Epithelial Cells     (FEW)  /lpf


 


Urine Bacteria     (NONE SEEN)  /hpf


 


Digoxin   0.5 L   (0.8-2.0)  ng/ml


 


Valproic Acid    78.8  (50.0-100.0)  ug/mL














  02/27/17 Range/Units





  16:35 


 


WBC   (4.8-10.8)  Th/cmm


 


RBC   (3.80-5.20)  Mil/cmm


 


Hgb   (11.7-16.1)  gm/dL


 


Hct   (35.0-45.0)  %


 


MCV   ()  fl


 


MCH   (27.0-31.0)  pg


 


MCHC Differential   (28.0-36.0)  pg


 


RDW   (11.5-20.0)  %


 


Plt Count   (150-400)  Th/cmm


 


MPV   fl


 


Neutrophils %   (40.0-80.0)  %


 


Lymphocytes %   (20.0-50.0)  %


 


Monocytes %   (2.0-10.0)  %


 


Eosinophils %   (0.0-5.0)  %


 


Basophils %   (0.0-2.0)  %


 


PT   (9.5-11.5)  SECONDS


 


INR   (0.5-1.4)  


 


PTT (Actin FS)   (26.0-38.0)  SECONDS


 


Sodium   (136-145)  mEq/L


 


Potassium   (3.5-5.1)  mEq/L


 


Chloride   ()  mEq/L


 


Carbon Dioxide   (21.0-31.0)  mEq/L


 


Anion Gap   (7.0-16.0)  


 


BUN   (7-25)  mg/dL


 


Creatinine   (0.6-1.2)  mg/dL


 


Est GFR (African Amer)   


 


Est GFR (Non-Af Amer)   


 


BUN/Creatinine Ratio   


 


Glucose   ()  mg/dL


 


Whole Bld Lactic Acid   (0.60-1.99)  mmol/L


 


Calcium   (8.6-10.3)  mg/dL


 


Total Bilirubin   (0.3-1.0)  mg/dL


 


AST   (13-39)  U/L


 


ALT   (7-52)  U/L


 


Alkaline Phosphatase   ()  U/L


 


Total Protein   (6.0-8.3)  gm/dL


 


Albumin   (3.7-5.3)  gm/dL


 


Globulin   gm/dL


 


Albumin/Globulin Ratio   (1.0-1.8)  


 


Urine Source  CATH  


 


Urine Color  YELLOW  


 


Urine Clarity  SLIGHTLY CLOUDY  (CLEAR)  


 


Urine pH  6.0  


 


Ur Specific Gravity  1.015  (1.005-1.030)  


 


Urine Protein  TRACE  (NEGATIVE)  mg/dL


 


Urine Glucose (UA)  NEGATIVE  (NEGATIVE)  mg/dL


 


Urine Ketones  NEGATIVE  (NEGATIVE)  mg/dL


 


Urine Blood  SMALL H  (NEGATIVE)  


 


Urine Nitrate  POSITIVE H  (NEGATIVE)  


 


Urine Bilirubin  NEGATIVE  (NEGATIVE)  


 


Urine Urobilinogen  0.2  (0.2 - 1.0)  E.U./dL


 


Ur Leukocyte Esterase  LARGE H  (NEGATIVE)  


 


Urine RBC  2-5  (0-5)  /hpf


 


Urine WBC   H  (0-5)  /hpf


 


Ur Epithelial Cells  FEW  (FEW)  /lpf


 


Urine Bacteria  MODERATE  (NONE SEEN)  /hpf


 


Digoxin   (0.8-2.0)  ng/ml


 


Valproic Acid   (50.0-100.0)  ug/mL














- EKG Interpretations


Comments:: 





12-lead EKG Interpretation by PATY Cardoza MD:





Normal Sinus Rhythm with ventricular rate of 82 beats per minute


Normal axis


Slight right metabolic block


No acute ST or T wave changes. No obvious STEMI








ED Septic Shock





- .


Is Septic Shock (SBP<90, OR Lactate>4 mmol\L) present?: No





Reassessment (Disposition)





- Reassessment


Reassessment:: 





Patient has a UTI.  She is quite agitated.  This is most likely due to the UTI.

  Spoke to her primary care physician Dr. Torres.  He will admit the patient for 

further workup and treatment.  She is at risk for possible early sepsis.  She 

also appears to be somewhat dehydrated.  She will need IV hydration and IV 

antibiotics.  We did administer IV normal saline and IV Rocephin.


Reassessment Condition:: Unchanged





- Diagnosis


Diagnosis:: 





UTI with hematuria


Dehydration





- Patient Disposition


Discharge/Transfer:: Acute Care w/in this hosp


Admitted to:: Med/Surg


Admitting Medical Physician:: Royal Torres


Time:: 17:17


Condition at Disposition:: Stable Continue Lasix as prescribed.  Follow-up with family doctor for reevaluation.

## 2025-02-22 SDOH — ECONOMIC STABILITY: FOOD INSECURITY: WITHIN THE PAST 12 MONTHS, THE FOOD YOU BOUGHT JUST DIDN'T LAST AND YOU DIDN'T HAVE MONEY TO GET MORE.: NEVER TRUE

## 2025-02-22 SDOH — ECONOMIC STABILITY: INCOME INSECURITY: IN THE LAST 12 MONTHS, WAS THERE A TIME WHEN YOU WERE NOT ABLE TO PAY THE MORTGAGE OR RENT ON TIME?: NO

## 2025-02-22 SDOH — ECONOMIC STABILITY: FOOD INSECURITY: WITHIN THE PAST 12 MONTHS, YOU WORRIED THAT YOUR FOOD WOULD RUN OUT BEFORE YOU GOT MONEY TO BUY MORE.: NEVER TRUE

## 2025-02-22 SDOH — ECONOMIC STABILITY: TRANSPORTATION INSECURITY
IN THE PAST 12 MONTHS, HAS THE LACK OF TRANSPORTATION KEPT YOU FROM MEDICAL APPOINTMENTS OR FROM GETTING MEDICATIONS?: NO

## 2025-02-22 SDOH — ECONOMIC STABILITY: TRANSPORTATION INSECURITY
IN THE PAST 12 MONTHS, HAS LACK OF TRANSPORTATION KEPT YOU FROM MEETINGS, WORK, OR FROM GETTING THINGS NEEDED FOR DAILY LIVING?: NO

## 2025-02-22 ASSESSMENT — PATIENT HEALTH QUESTIONNAIRE - PHQ9
SUM OF ALL RESPONSES TO PHQ QUESTIONS 1-9: 8
10. IF YOU CHECKED OFF ANY PROBLEMS, HOW DIFFICULT HAVE THESE PROBLEMS MADE IT FOR YOU TO DO YOUR WORK, TAKE CARE OF THINGS AT HOME, OR GET ALONG WITH OTHER PEOPLE: SOMEWHAT DIFFICULT
SUM OF ALL RESPONSES TO PHQ QUESTIONS 1-9: 8
1. LITTLE INTEREST OR PLEASURE IN DOING THINGS: MORE THAN HALF THE DAYS
9. THOUGHTS THAT YOU WOULD BE BETTER OFF DEAD, OR OF HURTING YOURSELF: SEVERAL DAYS
3. TROUBLE FALLING OR STAYING ASLEEP: SEVERAL DAYS
2. FEELING DOWN, DEPRESSED OR HOPELESS: NOT AT ALL
7. TROUBLE CONCENTRATING ON THINGS, SUCH AS READING THE NEWSPAPER OR WATCHING TELEVISION: SEVERAL DAYS
6. FEELING BAD ABOUT YOURSELF - OR THAT YOU ARE A FAILURE OR HAVE LET YOURSELF OR YOUR FAMILY DOWN: SEVERAL DAYS
SUM OF ALL RESPONSES TO PHQ9 QUESTIONS 1 & 2: 2
SUM OF ALL RESPONSES TO PHQ QUESTIONS 1-9: 8
5. POOR APPETITE OR OVEREATING: SEVERAL DAYS
SUM OF ALL RESPONSES TO PHQ QUESTIONS 1-9: 8
8. MOVING OR SPEAKING SO SLOWLY THAT OTHER PEOPLE COULD HAVE NOTICED. OR THE OPPOSITE - BEING SO FIDGETY OR RESTLESS THAT YOU HAVE BEEN MOVING AROUND A LOT MORE THAN USUAL: NOT AT ALL
3. TROUBLE FALLING OR STAYING ASLEEP: SEVERAL DAYS
4. FEELING TIRED OR HAVING LITTLE ENERGY: SEVERAL DAYS
1. LITTLE INTEREST OR PLEASURE IN DOING THINGS: MORE THAN HALF THE DAYS
7. TROUBLE CONCENTRATING ON THINGS, SUCH AS READING THE NEWSPAPER OR WATCHING TELEVISION: SEVERAL DAYS
9. THOUGHTS THAT YOU WOULD BE BETTER OFF DEAD, OR OF HURTING YOURSELF: SEVERAL DAYS
6. FEELING BAD ABOUT YOURSELF - OR THAT YOU ARE A FAILURE OR HAVE LET YOURSELF OR YOUR FAMILY DOWN: SEVERAL DAYS
5. POOR APPETITE OR OVEREATING: SEVERAL DAYS
SUM OF ALL RESPONSES TO PHQ QUESTIONS 1-9: 7
2. FEELING DOWN, DEPRESSED OR HOPELESS: NOT AT ALL
10. IF YOU CHECKED OFF ANY PROBLEMS, HOW DIFFICULT HAVE THESE PROBLEMS MADE IT FOR YOU TO DO YOUR WORK, TAKE CARE OF THINGS AT HOME, OR GET ALONG WITH OTHER PEOPLE: SOMEWHAT DIFFICULT
8. MOVING OR SPEAKING SO SLOWLY THAT OTHER PEOPLE COULD HAVE NOTICED. OR THE OPPOSITE, BEING SO FIGETY OR RESTLESS THAT YOU HAVE BEEN MOVING AROUND A LOT MORE THAN USUAL: NOT AT ALL
4. FEELING TIRED OR HAVING LITTLE ENERGY: SEVERAL DAYS

## 2025-02-22 ASSESSMENT — COLUMBIA-SUICIDE SEVERITY RATING SCALE - C-SSRS
1. IN THE PAST MONTH, HAVE YOU WISHED YOU WERE DEAD OR WISHED YOU COULD GO TO SLEEP AND NOT WAKE UP?: YES
2. IN THE PAST MONTH, HAVE YOU ACTUALLY HAD ANY THOUGHTS OF KILLING YOURSELF?: NO
6. IN YOUR LIFETIME, HAVE YOU EVER DONE ANYTHING, STARTED TO DO ANYTHING, OR PREPARED TO DO ANYTHING TO END YOUR LIFE?: NO

## 2025-02-24 ENCOUNTER — OFFICE VISIT (OUTPATIENT)
Dept: FAMILY MEDICINE CLINIC | Age: 89
End: 2025-02-24

## 2025-02-24 VITALS
DIASTOLIC BLOOD PRESSURE: 58 MMHG | SYSTOLIC BLOOD PRESSURE: 118 MMHG | OXYGEN SATURATION: 94 % | WEIGHT: 196 LBS | HEIGHT: 68 IN | HEART RATE: 76 BPM | BODY MASS INDEX: 29.7 KG/M2

## 2025-02-24 DIAGNOSIS — R60.0 BILATERAL LEG EDEMA: Primary | ICD-10-CM

## 2025-02-24 LAB
EKG ATRIAL RATE: 81 BPM
EKG P AXIS: 57 DEGREES
EKG P-R INTERVAL: 200 MS
EKG Q-T INTERVAL: 414 MS
EKG QRS DURATION: 128 MS
EKG QTC CALCULATION (BAZETT): 480 MS
EKG R AXIS: -20 DEGREES
EKG T AXIS: 37 DEGREES
EKG VENTRICULAR RATE: 81 BPM

## 2025-02-24 PROCEDURE — 93010 ELECTROCARDIOGRAM REPORT: CPT | Performed by: INTERNAL MEDICINE

## 2025-02-24 SDOH — ECONOMIC STABILITY: FOOD INSECURITY: WITHIN THE PAST 12 MONTHS, YOU WORRIED THAT YOUR FOOD WOULD RUN OUT BEFORE YOU GOT MONEY TO BUY MORE.: NEVER TRUE

## 2025-02-24 SDOH — ECONOMIC STABILITY: FOOD INSECURITY: WITHIN THE PAST 12 MONTHS, THE FOOD YOU BOUGHT JUST DIDN'T LAST AND YOU DIDN'T HAVE MONEY TO GET MORE.: NEVER TRUE

## 2025-02-24 ASSESSMENT — ENCOUNTER SYMPTOMS
DIARRHEA: 0
SHORTNESS OF BREATH: 0
NAUSEA: 0
COUGH: 0
VOMITING: 0

## 2025-02-24 NOTE — PROGRESS NOTES
HPI Notes    Name: Renuka Alexander  : 1924         Chief Complaint:     Chief Complaint   Patient presents with    Leg Swelling     Bilateral leg swelling. Pt presented to ED on Friday. Pt was started on lasix 20mg QD x 3 days. Swelling has improved.       History of Present Illness:        HPI  Pt is a 89 yo female who presents for follow up. Call was placed to this office on 25 from nursing. They were concerned about sudden onset bilat leg swelling. Orders were given to start lasix 20mg. Later that evening, pt reported to the ED for same concerns. Troponin was elevated, but EKG and other findings were normal.     Past Medical History:     Past Medical History:   Diagnosis Date    GERD (gastroesophageal reflux disease)     Hyperlipidemia     Hypertension     Urinary incontinence       Reviewed all health maintenance requirements and ordered appropriate tests  Health Maintenance Due   Topic Date Due    Shingles vaccine (1 of 2) Never done    Respiratory Syncytial Virus (RSV) Pregnant or age 60 yrs+ (1 - 1-dose 75+ series) Never done    Flu vaccine (1) 2024    COVID-19 Vaccine (2024- season) 2024       Past Surgical History:     Past Surgical History:   Procedure Laterality Date    BUNIONECTOMY      CHOLECYSTECTOMY, LAPAROSCOPIC  12    TONSILLECTOMY          Medications:       Prior to Admission medications    Medication Sig Start Date End Date Taking? Authorizing Provider   furosemide (LASIX) 20 MG tablet Take 1 tablet by mouth daily For three days 25  Yes Jony Lemus DNP   escitalopram (LEXAPRO) 5 MG tablet TAKE 1 TABLET EVERY DAY 25  Yes Jony eLmus DNP   hydroCHLOROthiazide 12.5 MG capsule TAKE 1 CAPSULE EVERY DAY 10/16/24  Yes Belen Armas MD   omeprazole (PRILOSEC) 20 MG delayed release capsule TAKE 1 CAPSULE EVERY DAY 10/16/24  Yes Belen Armas MD   benzonatate (TESSALON) 100 MG capsule Take 1 capsule by mouth 3 times daily as needed

## 2025-03-18 ENCOUNTER — TELEPHONE (OUTPATIENT)
Dept: FAMILY MEDICINE CLINIC | Age: 89
End: 2025-03-18

## 2025-03-18 RX ORDER — ONDANSETRON 4 MG/1
4 TABLET, FILM COATED ORAL DAILY PRN
Qty: 30 TABLET | Refills: 0 | Status: SHIPPED | OUTPATIENT
Start: 2025-03-18

## 2025-03-18 NOTE — TELEPHONE ENCOUNTER
Spoke with Shirley at the [facility] and told her zofran sent to [facility] and the son can  imodium  To give one pill after loose stool as needed

## 2025-03-18 NOTE — TELEPHONE ENCOUNTER
Tell the [facility] I sent over the ZOfran but could the son  the Immodium and take 1 tablet after a loose stool as needed.

## 2025-03-18 NOTE — TELEPHONE ENCOUNTER
Patient has caught a little bug and they are asking for a refill on Zofran and asking for a prescription of Imodium.  Shirley states her diarrhea is pretty bad.      [facility]    Health Maintenance   Topic Date Due    Shingles vaccine (1 of 2) Never done    Respiratory Syncytial Virus (RSV) Pregnant or age 60 yrs+ (1 - 1-dose 75+ series) Never done    Flu vaccine (1) 08/01/2024    COVID-19 Vaccine (9 - 2024-25 season) 09/01/2024    Annual Wellness Visit (Medicare)  10/01/2025    Depression Monitoring  02/22/2026    DTaP/Tdap/Td vaccine (2 - Td or Tdap) 06/18/2028    Pneumococcal 50+ years Vaccine  Completed    Hepatitis A vaccine  Aged Out    Hepatitis B vaccine  Aged Out    Hib vaccine  Aged Out    Polio vaccine  Aged Out    Meningococcal (ACWY) vaccine  Aged Out    Meningococcal B vaccine  Aged Out    Depression Screen  Discontinued             (applicable per patient's age: Cancer Screenings, Depression Screening, Fall Risk Screening, Immunizations)    AST (U/L)   Date Value   02/21/2025 24     ALT (U/L)   Date Value   02/21/2025 15     BUN (mg/dL)   Date Value   02/21/2025 26 (H)      (goal A1C is < 7)   (goal LDL is <100) need 30-50% reduction from baseline     BP Readings from Last 3 Encounters:   02/24/25 (!) 118/58   02/21/25 (!) 115/57   09/30/24 118/74    (goal /80)      All Future Testing planned in [facility]:  Lab Frequency Next Occurrence   Basic Metabolic Panel Once 03/28/2025       Next Visit Date:  Future Appointments   Date Time Provider Department Center   3/31/2025  9:40 AM Belen Armas MD [facility]            Patient Active Problem List:     Esophageal reflux     Diaphragmatic hernia     Essential hypertension, benign     Pure hypercholesterolemia     Endometrial thickening on ultrasound     Cervical stenosis (uterine cervix)     Arthritis of low back     Disease due to severe acute respiratory syndrome coronavirus 2 (SARS-CoV-2)     Basal cell carcinoma,

## 2025-03-26 ENCOUNTER — HOSPITAL ENCOUNTER (OUTPATIENT)
Age: 89
Setting detail: SPECIMEN
Discharge: HOME | End: 2025-03-26
Payer: MEDICARE

## 2025-03-26 DIAGNOSIS — R60.0 BILATERAL LEG EDEMA: ICD-10-CM

## 2025-03-26 LAB
ANION GAP SERPL CALCULATED.3IONS-SCNC: 9 MMOL/L (ref 9–17)
BUN SERPL-MCNC: 20 MG/DL (ref 8–23)
CALCIUM SERPL-MCNC: 9.6 MG/DL (ref 8.6–10.4)
CHLORIDE SERPL-SCNC: 101 MMOL/L (ref 98–107)
CO2 SERPL-SCNC: 32 MMOL/L (ref 20–31)
CREAT SERPL-MCNC: 1 MG/DL (ref 0.5–0.9)
GFR, ESTIMATED: 50 ML/MIN/1.73M2
GLUCOSE SERPL-MCNC: 109 MG/DL (ref 70–99)
POTASSIUM SERPL-SCNC: 3.1 MMOL/L (ref 3.7–5.3)
SODIUM SERPL-SCNC: 142 MMOL/L (ref 135–144)

## 2025-03-26 PROCEDURE — 80048 BASIC METABOLIC PNL TOTAL CA: CPT

## 2025-03-27 ENCOUNTER — RESULTS FOLLOW-UP (OUTPATIENT)
Dept: FAMILY MEDICINE CLINIC | Age: 89
End: 2025-03-27

## 2025-03-31 ENCOUNTER — OFFICE VISIT (OUTPATIENT)
Dept: FAMILY MEDICINE CLINIC | Age: 89
End: 2025-03-31

## 2025-03-31 VITALS
BODY MASS INDEX: 29.4 KG/M2 | HEART RATE: 87 BPM | HEIGHT: 68 IN | SYSTOLIC BLOOD PRESSURE: 120 MMHG | OXYGEN SATURATION: 84 % | DIASTOLIC BLOOD PRESSURE: 60 MMHG | WEIGHT: 194 LBS

## 2025-03-31 DIAGNOSIS — E87.6 HYPOKALEMIA: ICD-10-CM

## 2025-03-31 DIAGNOSIS — I10 ESSENTIAL HYPERTENSION, BENIGN: Primary | ICD-10-CM

## 2025-03-31 DIAGNOSIS — F34.1 DYSTHYMIA: ICD-10-CM

## 2025-03-31 DIAGNOSIS — B35.3 TINEA PEDIS OF RIGHT FOOT: ICD-10-CM

## 2025-03-31 DIAGNOSIS — K21.9 GASTROESOPHAGEAL REFLUX DISEASE WITHOUT ESOPHAGITIS: ICD-10-CM

## 2025-03-31 DIAGNOSIS — I87.8 VENOUS STASIS OF LOWER EXTREMITY: ICD-10-CM

## 2025-03-31 RX ORDER — CLOTRIMAZOLE 1 %
CREAM (GRAM) TOPICAL
Qty: 85 G | Refills: 2 | Status: SHIPPED | OUTPATIENT
Start: 2025-03-31 | End: 2025-04-07

## 2025-03-31 ASSESSMENT — ENCOUNTER SYMPTOMS
BLOOD IN STOOL: 0
DIARRHEA: 0
EYE REDNESS: 0
HEARTBURN: 0
NAUSEA: 0
SORE THROAT: 0
EYE DISCHARGE: 0
SHORTNESS OF BREATH: 0
COUGH: 1
VOMITING: 0

## 2025-03-31 NOTE — PROGRESS NOTES
HPI Notes    Name: Renuka Alexander  : 1924        Chief Complaint:     Chief Complaint   Patient presents with    Hypertension    Gastroesophageal Reflux    Cough     X one month wheezing        History of Present Illness:     Renuka Alexander is a 89 y.o.  female who presents with Hypertension, Gastroesophageal Reflux, and Cough (X one month wheezing )      Hypertension  This is a chronic problem. The current episode started more than 1 year ago. The problem is unchanged. The problem is controlled. Associated symptoms include peripheral edema. Pertinent negatives include no chest pain, headaches, malaise/fatigue, palpitations or shortness of breath. There are no associated agents to hypertension. Risk factors for coronary artery disease include post-menopausal state. The current treatment provides significant improvement.   Gastroesophageal Reflux  She complains of coughing. She reports no chest pain, no dysphagia, no heartburn, no nausea or no sore throat. pt is doing well on the Lexapro. This is a chronic problem. The current episode started more than 1 year ago. The problem has been unchanged. The symptoms are aggravated by certain foods. Pertinent negatives include no fatigue or melena. She has tried a PPI for the symptoms. The treatment provided significant relief.   Cough  This is a new problem. The current episode started more than 1 month ago. The problem has been unchanged. The cough is Non-productive. Associated symptoms include a rash. Pertinent negatives include no chest pain, chills, ear pain, eye redness, fever, headaches, heartburn, sore throat or shortness of breath. She has tried nothing for the symptoms. There is no history of asthma, COPD or pneumonia.   Mental Health Problem  The primary symptoms do not include dysphoric mood, delusions, hallucinations or disorganized speech. Primary symptoms comment: pt is doing well on the Lexapro. The current episode started more than 1 month

## 2025-04-03 ENCOUNTER — HOSPITAL ENCOUNTER (OUTPATIENT)
Age: 89
Setting detail: SPECIMEN
Discharge: HOME | End: 2025-04-03
Payer: MEDICARE

## 2025-04-03 ENCOUNTER — RESULTS FOLLOW-UP (OUTPATIENT)
Dept: FAMILY MEDICINE CLINIC | Age: 89
End: 2025-04-03

## 2025-04-03 DIAGNOSIS — I87.8 VENOUS STASIS OF LOWER EXTREMITY: ICD-10-CM

## 2025-04-03 DIAGNOSIS — E87.6 HYPOKALEMIA: Primary | ICD-10-CM

## 2025-04-03 DIAGNOSIS — E87.6 HYPOKALEMIA: ICD-10-CM

## 2025-04-03 LAB
ANION GAP SERPL CALCULATED.3IONS-SCNC: 8 MMOL/L (ref 9–17)
BUN SERPL-MCNC: 25 MG/DL (ref 8–23)
CALCIUM SERPL-MCNC: 9.6 MG/DL (ref 8.6–10.4)
CHLORIDE SERPL-SCNC: 102 MMOL/L (ref 98–107)
CO2 SERPL-SCNC: 33 MMOL/L (ref 20–31)
CREAT SERPL-MCNC: 1 MG/DL (ref 0.5–0.9)
GFR, ESTIMATED: 50 ML/MIN/1.73M2
GLUCOSE SERPL-MCNC: 91 MG/DL (ref 70–99)
POTASSIUM SERPL-SCNC: 3.3 MMOL/L (ref 3.7–5.3)
SODIUM SERPL-SCNC: 143 MMOL/L (ref 135–144)

## 2025-04-03 PROCEDURE — 80048 BASIC METABOLIC PNL TOTAL CA: CPT

## 2025-04-11 ENCOUNTER — HOSPITAL ENCOUNTER (OUTPATIENT)
Age: 89
Setting detail: SPECIMEN
Discharge: HOME | End: 2025-04-11
Payer: MEDICARE

## 2025-04-11 ENCOUNTER — RESULTS FOLLOW-UP (OUTPATIENT)
Dept: FAMILY MEDICINE CLINIC | Age: 89
End: 2025-04-11

## 2025-04-11 LAB — POTASSIUM SERPL-SCNC: 3.4 MMOL/L (ref 3.7–5.3)

## 2025-04-11 PROCEDURE — 84132 ASSAY OF SERUM POTASSIUM: CPT

## 2025-04-25 ENCOUNTER — RESULTS FOLLOW-UP (OUTPATIENT)
Dept: FAMILY MEDICINE CLINIC | Age: 89
End: 2025-04-25

## 2025-04-25 ENCOUNTER — HOSPITAL ENCOUNTER (OUTPATIENT)
Age: 89
Setting detail: SPECIMEN
Discharge: HOME | End: 2025-04-25
Payer: MEDICARE

## 2025-04-25 LAB — POTASSIUM SERPL-SCNC: 4.3 MMOL/L (ref 3.7–5.3)

## 2025-04-25 PROCEDURE — 84132 ASSAY OF SERUM POTASSIUM: CPT

## 2025-06-10 ENCOUNTER — TELEPHONE (OUTPATIENT)
Dept: FAMILY MEDICINE CLINIC | Age: 89
End: 2025-06-10

## 2025-06-10 NOTE — TELEPHONE ENCOUNTER
Spoke with Shirley at the [facility] and PT order given.  Shirley said the tylenol is taking care of her discomfort.  Patient is doing fine and walking around the facility without any difficulty.        was notified.

## 2025-06-10 NOTE — TELEPHONE ENCOUNTER
[facility] reports that patient fell this morning and she has 2 skin tears on LLE  Complains of pain   Requesting PT eval and something for pain  She has acetaminophen on her med list

## 2025-06-10 NOTE — TELEPHONE ENCOUNTER
Call nurse at [facility] and ok for order for PT and for pain medication she may have some Motrin 400mg po every 8 hrs as needed for pain with FOOD

## 2025-06-17 ENCOUNTER — TELEPHONE (OUTPATIENT)
Dept: FAMILY MEDICINE CLINIC | Age: 89
End: 2025-06-17

## 2025-06-17 DIAGNOSIS — M79.89 LEG SWELLING: Primary | ICD-10-CM

## 2025-06-17 RX ORDER — FUROSEMIDE 20 MG/1
20 TABLET ORAL DAILY
Qty: 3 TABLET | Refills: 0 | Status: SHIPPED | OUTPATIENT
Start: 2025-06-17

## 2025-06-17 NOTE — TELEPHONE ENCOUNTER
I called and spoke to nurse Stella and pt is to stop going outside for sunning and stay inside with compression hose on daily until her leg wounds heal. They are to keep her legs up more while sitting and then try the lasix 20mg one daily for 3d.   Also keep their current wound care with the duoderm and wrap open leg wounds.

## 2025-06-17 NOTE — TELEPHONE ENCOUNTER
Stella called from the [facility] pt had a fall last week and has two skin tears on the lower left leg. Yesterday 06/16/25 pt hit her right elbow and received a skin tear. The left lower leg  started seeping and the right one looks like it can start seeping any time. Stella also stated the she can hear fluid in pt's chest. Requesting a Rx of lasix.   Medication pending for approval

## 2025-06-19 ENCOUNTER — TELEPHONE (OUTPATIENT)
Dept: FAMILY MEDICINE CLINIC | Age: 89
End: 2025-06-19

## 2025-06-19 NOTE — TELEPHONE ENCOUNTER
Spoke with Callie she stated that pt is about the same. Lasix has been started and ace wrapped pt's legs. Callie ordered her new jessie hose. Pt seams to be a little confused today but she did go for a scenic drive. -MARZENA

## 2025-06-20 ENCOUNTER — HOSPITAL ENCOUNTER (OUTPATIENT)
Age: 89
Setting detail: SPECIMEN
Discharge: HOME | End: 2025-06-20
Payer: MEDICARE

## 2025-06-20 ENCOUNTER — RESULTS FOLLOW-UP (OUTPATIENT)
Dept: FAMILY MEDICINE CLINIC | Age: 89
End: 2025-06-20

## 2025-06-20 LAB
-: ABNORMAL
BACTERIA URNS QL MICRO: ABNORMAL
BILIRUB UR QL STRIP: NEGATIVE
CLARITY UR: CLEAR
COLOR UR: YELLOW
COMMENT: ABNORMAL
EPI CELLS #/AREA URNS HPF: ABNORMAL /HPF
GLUCOSE UR STRIP-MCNC: NEGATIVE MG/DL
HGB UR QL STRIP.AUTO: NEGATIVE
KETONES UR STRIP-MCNC: NEGATIVE MG/DL
LEUKOCYTE ESTERASE UR QL STRIP: ABNORMAL
NITRITE UR QL STRIP: POSITIVE
PH UR STRIP: 7 [PH] (ref 5–8)
PROT UR STRIP-MCNC: ABNORMAL MG/DL
RBC #/AREA URNS HPF: ABNORMAL /HPF (ref 0–2)
SP GR UR STRIP: 1 (ref 1–1.03)
UROBILINOGEN UR STRIP-ACNC: NORMAL EU/DL (ref 0–1)
WBC #/AREA URNS HPF: ABNORMAL /HPF

## 2025-06-20 PROCEDURE — 81001 URINALYSIS AUTO W/SCOPE: CPT

## 2025-06-20 PROCEDURE — 87086 URINE CULTURE/COLONY COUNT: CPT

## 2025-06-20 PROCEDURE — 87186 SC STD MICRODIL/AGAR DIL: CPT

## 2025-06-20 PROCEDURE — 87088 URINE BACTERIA CULTURE: CPT

## 2025-06-20 RX ORDER — CIPROFLOXACIN 250 MG/1
250 TABLET, FILM COATED ORAL 2 TIMES DAILY
Qty: 14 TABLET | Refills: 0 | Status: SHIPPED | OUTPATIENT
Start: 2025-06-20 | End: 2025-06-27

## 2025-06-22 LAB
MICROORGANISM SPEC CULT: ABNORMAL
SPECIMEN DESCRIPTION: ABNORMAL

## 2025-07-29 ENCOUNTER — HOSPITAL ENCOUNTER (OUTPATIENT)
Age: 89
Setting detail: SPECIMEN
Discharge: HOME | End: 2025-07-29
Payer: MEDICARE

## 2025-07-29 LAB
-: ABNORMAL
BACTERIA URNS QL MICRO: ABNORMAL
BILIRUB UR QL STRIP: NEGATIVE
CLARITY UR: ABNORMAL
COLOR UR: YELLOW
COMMENT: ABNORMAL
EPI CELLS #/AREA URNS HPF: ABNORMAL /HPF
GLUCOSE UR STRIP-MCNC: NEGATIVE MG/DL
HGB UR QL STRIP.AUTO: ABNORMAL
KETONES UR STRIP-MCNC: NEGATIVE MG/DL
LEUKOCYTE ESTERASE UR QL STRIP: NEGATIVE
NITRITE UR QL STRIP: POSITIVE
PH UR STRIP: 6 [PH] (ref 5–8)
PROT UR STRIP-MCNC: ABNORMAL MG/DL
RBC #/AREA URNS HPF: ABNORMAL /HPF (ref 0–2)
SP GR UR STRIP: 1.02 (ref 1–1.03)
UROBILINOGEN UR STRIP-ACNC: NORMAL EU/DL (ref 0–1)
WBC #/AREA URNS HPF: ABNORMAL /HPF

## 2025-07-29 PROCEDURE — 87086 URINE CULTURE/COLONY COUNT: CPT

## 2025-07-29 PROCEDURE — 87077 CULTURE AEROBIC IDENTIFY: CPT

## 2025-07-29 PROCEDURE — 87186 SC STD MICRODIL/AGAR DIL: CPT

## 2025-07-29 PROCEDURE — 81001 URINALYSIS AUTO W/SCOPE: CPT

## 2025-07-31 LAB
MICROORGANISM SPEC CULT: ABNORMAL
SPECIMEN DESCRIPTION: ABNORMAL

## 2025-08-13 ENCOUNTER — TELEPHONE (OUTPATIENT)
Dept: FAMILY MEDICINE CLINIC | Age: 89
End: 2025-08-13

## 2025-08-13 DIAGNOSIS — K08.89 TOOTH PAIN: Primary | ICD-10-CM

## 2025-08-13 RX ORDER — TRAMADOL HYDROCHLORIDE 50 MG/1
50 TABLET ORAL EVERY 8 HOURS PRN
Qty: 3 TABLET | Refills: 0 | Status: SHIPPED | OUTPATIENT
Start: 2025-08-13 | End: 2025-08-16